# Patient Record
Sex: MALE | Race: BLACK OR AFRICAN AMERICAN | NOT HISPANIC OR LATINO | URBAN - METROPOLITAN AREA
[De-identification: names, ages, dates, MRNs, and addresses within clinical notes are randomized per-mention and may not be internally consistent; named-entity substitution may affect disease eponyms.]

---

## 2021-02-15 ENCOUNTER — INPATIENT (INPATIENT)
Facility: HOSPITAL | Age: 26
LOS: 1 days | Discharge: HOME | DRG: 378 | End: 2021-02-17
Attending: INTERNAL MEDICINE | Admitting: INTERNAL MEDICINE
Payer: MEDICAID

## 2021-02-15 VITALS
RESPIRATION RATE: 18 BRPM | SYSTOLIC BLOOD PRESSURE: 146 MMHG | OXYGEN SATURATION: 96 % | DIASTOLIC BLOOD PRESSURE: 102 MMHG | HEART RATE: 99 BPM | TEMPERATURE: 98 F | WEIGHT: 188.94 LBS

## 2021-02-15 DIAGNOSIS — K92.0 HEMATEMESIS: ICD-10-CM

## 2021-02-15 DIAGNOSIS — Z29.9 ENCOUNTER FOR PROPHYLACTIC MEASURES, UNSPECIFIED: ICD-10-CM

## 2021-02-15 DIAGNOSIS — F10.10 ALCOHOL ABUSE, UNCOMPLICATED: ICD-10-CM

## 2021-02-15 DIAGNOSIS — K29.21 ALCOHOLIC GASTRITIS WITH BLEEDING: ICD-10-CM

## 2021-02-15 LAB
ALBUMIN SERPL ELPH-MCNC: 3.7 G/DL — SIGNIFICANT CHANGE UP (ref 3.5–5)
ALP SERPL-CCNC: 104 U/L — SIGNIFICANT CHANGE UP (ref 40–120)
ALT FLD-CCNC: 185 U/L DA — HIGH (ref 10–60)
ANION GAP SERPL CALC-SCNC: 11 MMOL/L — SIGNIFICANT CHANGE UP (ref 5–17)
AST SERPL-CCNC: 534 U/L — HIGH (ref 10–40)
BASOPHILS # BLD AUTO: 0.03 K/UL — SIGNIFICANT CHANGE UP (ref 0–0.2)
BASOPHILS NFR BLD AUTO: 0.4 % — SIGNIFICANT CHANGE UP (ref 0–2)
BILIRUB DIRECT SERPL-MCNC: 0.4 MG/DL — HIGH (ref 0–0.2)
BILIRUB INDIRECT FLD-MCNC: 0.5 MG/DL — SIGNIFICANT CHANGE UP (ref 0.2–1)
BILIRUB SERPL-MCNC: 0.9 MG/DL — SIGNIFICANT CHANGE UP (ref 0.2–1.2)
BUN SERPL-MCNC: 7 MG/DL — SIGNIFICANT CHANGE UP (ref 7–18)
CALCIUM SERPL-MCNC: 9.2 MG/DL — SIGNIFICANT CHANGE UP (ref 8.4–10.5)
CHLORIDE SERPL-SCNC: 99 MMOL/L — SIGNIFICANT CHANGE UP (ref 96–108)
CO2 SERPL-SCNC: 29 MMOL/L — SIGNIFICANT CHANGE UP (ref 22–31)
CREAT SERPL-MCNC: 0.97 MG/DL — SIGNIFICANT CHANGE UP (ref 0.5–1.3)
EOSINOPHIL # BLD AUTO: 0.08 K/UL — SIGNIFICANT CHANGE UP (ref 0–0.5)
EOSINOPHIL NFR BLD AUTO: 1 % — SIGNIFICANT CHANGE UP (ref 0–6)
ETHANOL SERPL-MCNC: 97 MG/DL — HIGH (ref 0–10)
GLUCOSE SERPL-MCNC: 85 MG/DL — SIGNIFICANT CHANGE UP (ref 70–99)
HCT VFR BLD CALC: 29.3 % — LOW (ref 39–50)
HCT VFR BLD CALC: 38.8 % — LOW (ref 39–50)
HGB BLD-MCNC: 13.2 G/DL — SIGNIFICANT CHANGE UP (ref 13–17)
HGB BLD-MCNC: 9.3 G/DL — LOW (ref 13–17)
HIV 1 & 2 AB SERPL IA.RAPID: SIGNIFICANT CHANGE UP
IMM GRANULOCYTES NFR BLD AUTO: 0.8 % — SIGNIFICANT CHANGE UP (ref 0–1.5)
LIDOCAIN IGE QN: 146 U/L — SIGNIFICANT CHANGE UP (ref 73–393)
LYMPHOCYTES # BLD AUTO: 0.92 K/UL — LOW (ref 1–3.3)
LYMPHOCYTES # BLD AUTO: 11.7 % — LOW (ref 13–44)
MAGNESIUM SERPL-MCNC: 2.2 MG/DL — SIGNIFICANT CHANGE UP (ref 1.6–2.6)
MCHC RBC-ENTMCNC: 29.6 PG — SIGNIFICANT CHANGE UP (ref 27–34)
MCHC RBC-ENTMCNC: 31.7 GM/DL — LOW (ref 32–36)
MCHC RBC-ENTMCNC: 34 GM/DL — SIGNIFICANT CHANGE UP (ref 32–36)
MCHC RBC-ENTMCNC: 35.3 PG — HIGH (ref 27–34)
MCV RBC AUTO: 103.7 FL — HIGH (ref 80–100)
MCV RBC AUTO: 93.3 FL — SIGNIFICANT CHANGE UP (ref 80–100)
MONOCYTES # BLD AUTO: 1.28 K/UL — HIGH (ref 0–0.9)
MONOCYTES NFR BLD AUTO: 16.3 % — HIGH (ref 2–14)
NEUTROPHILS # BLD AUTO: 5.5 K/UL — SIGNIFICANT CHANGE UP (ref 1.8–7.4)
NEUTROPHILS NFR BLD AUTO: 69.8 % — SIGNIFICANT CHANGE UP (ref 43–77)
NRBC # BLD: 0 /100 WBCS — SIGNIFICANT CHANGE UP (ref 0–0)
NRBC # BLD: 0 /100 WBCS — SIGNIFICANT CHANGE UP (ref 0–0)
PHOSPHATE SERPL-MCNC: 4.4 MG/DL — SIGNIFICANT CHANGE UP (ref 2.5–4.5)
PLATELET # BLD AUTO: 158 K/UL — SIGNIFICANT CHANGE UP (ref 150–400)
PLATELET # BLD AUTO: 196 K/UL — SIGNIFICANT CHANGE UP (ref 150–400)
POTASSIUM SERPL-MCNC: 3.1 MMOL/L — LOW (ref 3.5–5.3)
POTASSIUM SERPL-SCNC: 3.1 MMOL/L — LOW (ref 3.5–5.3)
PROT SERPL-MCNC: 7.5 G/DL — SIGNIFICANT CHANGE UP (ref 6–8.3)
RBC # BLD: 3.14 M/UL — LOW (ref 4.2–5.8)
RBC # BLD: 3.74 M/UL — LOW (ref 4.2–5.8)
RBC # FLD: 12.3 % — SIGNIFICANT CHANGE UP (ref 10.3–14.5)
RBC # FLD: 15.9 % — HIGH (ref 10.3–14.5)
SARS-COV-2 RNA SPEC QL NAA+PROBE: SIGNIFICANT CHANGE UP
SODIUM SERPL-SCNC: 139 MMOL/L — SIGNIFICANT CHANGE UP (ref 135–145)
WBC # BLD: 4.44 K/UL — SIGNIFICANT CHANGE UP (ref 3.8–10.5)
WBC # BLD: 7.87 K/UL — SIGNIFICANT CHANGE UP (ref 3.8–10.5)
WBC # FLD AUTO: 4.44 K/UL — SIGNIFICANT CHANGE UP (ref 3.8–10.5)
WBC # FLD AUTO: 7.87 K/UL — SIGNIFICANT CHANGE UP (ref 3.8–10.5)

## 2021-02-15 PROCEDURE — 99285 EMERGENCY DEPT VISIT HI MDM: CPT

## 2021-02-15 RX ORDER — THIAMINE MONONITRATE (VIT B1) 100 MG
500 TABLET ORAL EVERY 8 HOURS
Refills: 0 | Status: DISCONTINUED | OUTPATIENT
Start: 2021-02-15 | End: 2021-02-17

## 2021-02-15 RX ORDER — ONDANSETRON 8 MG/1
4 TABLET, FILM COATED ORAL ONCE
Refills: 0 | Status: COMPLETED | OUTPATIENT
Start: 2021-02-15 | End: 2021-02-15

## 2021-02-15 RX ORDER — PANTOPRAZOLE SODIUM 20 MG/1
40 TABLET, DELAYED RELEASE ORAL ONCE
Refills: 0 | Status: COMPLETED | OUTPATIENT
Start: 2021-02-15 | End: 2021-02-15

## 2021-02-15 RX ORDER — SUCRALFATE 1 G
1 TABLET ORAL ONCE
Refills: 0 | Status: COMPLETED | OUTPATIENT
Start: 2021-02-15 | End: 2021-02-15

## 2021-02-15 RX ORDER — ONDANSETRON 8 MG/1
4 TABLET, FILM COATED ORAL EVERY 6 HOURS
Refills: 0 | Status: DISCONTINUED | OUTPATIENT
Start: 2021-02-15 | End: 2021-02-17

## 2021-02-15 RX ORDER — SODIUM CHLORIDE 9 MG/ML
1000 INJECTION INTRAMUSCULAR; INTRAVENOUS; SUBCUTANEOUS
Refills: 0 | Status: DISCONTINUED | OUTPATIENT
Start: 2021-02-15 | End: 2021-02-17

## 2021-02-15 RX ORDER — SODIUM CHLORIDE 9 MG/ML
1000 INJECTION INTRAMUSCULAR; INTRAVENOUS; SUBCUTANEOUS ONCE
Refills: 0 | Status: COMPLETED | OUTPATIENT
Start: 2021-02-15 | End: 2021-02-15

## 2021-02-15 RX ORDER — SODIUM CHLORIDE 9 MG/ML
2000 INJECTION INTRAMUSCULAR; INTRAVENOUS; SUBCUTANEOUS ONCE
Refills: 0 | Status: COMPLETED | OUTPATIENT
Start: 2021-02-15 | End: 2021-02-15

## 2021-02-15 RX ORDER — PANTOPRAZOLE SODIUM 20 MG/1
40 TABLET, DELAYED RELEASE ORAL
Refills: 0 | Status: DISCONTINUED | OUTPATIENT
Start: 2021-02-15 | End: 2021-02-16

## 2021-02-15 RX ORDER — FOLIC ACID 0.8 MG
1 TABLET ORAL DAILY
Refills: 0 | Status: DISCONTINUED | OUTPATIENT
Start: 2021-02-15 | End: 2021-02-17

## 2021-02-15 RX ADMIN — ONDANSETRON 4 MILLIGRAM(S): 8 TABLET, FILM COATED ORAL at 13:26

## 2021-02-15 RX ADMIN — Medication 50 MILLIGRAM(S): at 09:19

## 2021-02-15 RX ADMIN — PANTOPRAZOLE SODIUM 40 MILLIGRAM(S): 20 TABLET, DELAYED RELEASE ORAL at 09:20

## 2021-02-15 RX ADMIN — SODIUM CHLORIDE 1000 MILLILITER(S): 9 INJECTION INTRAMUSCULAR; INTRAVENOUS; SUBCUTANEOUS at 13:26

## 2021-02-15 RX ADMIN — Medication 1 GRAM(S): at 13:26

## 2021-02-15 RX ADMIN — SODIUM CHLORIDE 2000 MILLILITER(S): 9 INJECTION INTRAMUSCULAR; INTRAVENOUS; SUBCUTANEOUS at 10:05

## 2021-02-15 RX ADMIN — SODIUM CHLORIDE 2000 MILLILITER(S): 9 INJECTION INTRAMUSCULAR; INTRAVENOUS; SUBCUTANEOUS at 11:07

## 2021-02-15 RX ADMIN — Medication 105 MILLIGRAM(S): at 21:56

## 2021-02-15 NOTE — H&P ADULT - HISTORY OF PRESENT ILLNESS
25M. from home, self ambulating, chronic Alc abuse,  with no significant PMHx presents to the ED c/o abdominal pain and vomiting x today. Pt is AAOX3 and mentions thar he was in his usual state of health until last 3 months and then developed intermittent vomiting immediately after taking his alcohol. He takes 2 pints of alcohol day x 3 yrs.  Pt with minimal PO intake secondary to  nausea and vomiting. Pt notes emesis initially to be  with food content and then goes to bright red blood tinged emesis and then light blood tinged emesis. He called the EMS due to persistent vomitings  Pt has been having recurring episodes for the past 3 months. He was admitted in [facility] yesterday for similar complaints. Pt tried to stop drinking yesterday and would end up drinking more whenever he would go into withdrawal.  Pt's last drink to be somewhere this morning( exact time n unknown). While being transported by the EMS he fell asleep and woke up with   tremors, clenched his girlfriend. Pt also have epigastric discomfort, described as burning.   Pt denies any other complaints  He takes 2 pints of alcohol day x 3 yrs.    In the ED,  Pt appears comfortable, no signs of distress, AAOX3   Vitals- 137/86, Hr 85, afebrile   Hb 9.3  AST// 185  bal 97

## 2021-02-15 NOTE — ED PROVIDER NOTE - CARE PLAN
Principal Discharge DX:	Alcohol withdrawal syndrome without complication  Secondary Diagnosis:	Hematemesis with nausea

## 2021-02-15 NOTE — ED PROVIDER NOTE - NEUROLOGICAL, MLM
Alert and oriented, no focal deficits, no motor or sensory deficits. Pt not tremulous or anxious in ED.

## 2021-02-15 NOTE — H&P ADULT - NSHPLABSRESULTS_GEN_ALL_CORE
9.3    7.87  )-----------( 196      ( 15 Feb 2021 14:43 )             29.3       02-15    139  |  99  |  7   ----------------------------<  85  3.1<L>   |  29  |  0.97    Ca    9.2      15 Feb 2021 09:11  Phos  4.4     02-15  Mg     2.2     02-15    TPro  7.5  /  Alb  3.7  /  TBili  0.9  /  DBili  0.4<H>  /  AST  534<H>  /  ALT  185<H>  /  AlkPhos  104  02-15                      Lactate Trend            CAPILLARY BLOOD GLUCOSE      POCT Blood Glucose.: 96 mg/dL (15 Feb 2021 08:39)

## 2021-02-15 NOTE — CHART NOTE - NSCHARTNOTEFT_GEN_A_CORE
Met with patient at the bedside in the Emergency Department. Patient is corporative with screening and assessment.  Patient informed that he is in pain and believes he needs help to stop drinking. He shared that he has been drinking everyday for approximately 3 years and that it has gotten worse. 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 He also shared that he has received 3 DWI in the last couple of years because he cannot stop drinking.   SBIRT screening completed.  Patient committed to being in detox and rehabilitation.   Contact was made with a number of possible detox programs and [facility] (Tel. [phone]) responded positively noting that they would arrange transportation for admission today.  Patient is cleared for discharge to a detox program.

## 2021-02-15 NOTE — H&P ADULT - PROBLEM SELECTOR PLAN 4
IMPROVE VTE Individual Risk Assessment    RISK                                                          Points  [] Previous VTE                                           3  [] Thrombophilia                                        2  [] Lower limb paralysis                              2   [] Current Cancer                                       2   [x] Immobilization > 24 hrs                        1  [] ICU/CCU stay > 24 hours                       1  [] Age > 60                                                   1    IMPROVE VTE Score:  no ac for hemetemesis   PPI

## 2021-02-15 NOTE — H&P ADULT - PROBLEM SELECTOR PLAN 1
Pt admitted for abd pain and multiple vomitings today with hemetamesis  Vomtings were non bloody initially and then became bloody   Pt takes 2 pint of alcohol/day x 3 yrs   Symptoms likely due to Alc induced gastritis  c/w ivf and PPI   Clear diet for now   Hb 9.3   f/u cbc q12   No repeat episode of vomiting in ED   f/U CT angio abd   f/u GI - Dr. Meehan

## 2021-02-15 NOTE — H&P ADULT - NSHPPHYSICALEXAM_GEN_ALL_CORE
Vital Signs (24 Hrs):  T(C): 36.6 (02-15-21 @ 15:44), Max: 36.6 (02-15-21 @ 11:42)  HR: 85 (02-15-21 @ 15:44) (83 - 99)  BP: 137/86 (02-15-21 @ 15:44) (137/86 - 146/102)  RR: 18 (02-15-21 @ 15:44) (18 - 18)  SpO2: 99% (02-15-21 @ 15:44) (96% - 100%)  Wt(kg): --  Daily     Daily     I&O's Summary

## 2021-02-15 NOTE — H&P ADULT - ATTENDING COMMENTS
PATIENT SEEN AND EXAMINED. CASE D/W ER MD AND RESIDENT TEAM. ABOVE ROS/VS/PE REVIEWED AND VERIFIED.    HPI:    25M. from home, self ambulating, chronic Alc abuse,  with no significant PMHx presents to the ED c/o abdominal pain and vomiting x today. Pt is AAOX3 and mentions thar he was in his usual state of health until last 3 months and then developed intermittent vomiting immediately after taking his alcohol. He takes 2 pints of alcohol day x 3 yrs.  Pt with minimal PO intake secondary to  nausea and vomiting. Pt notes emesis initially to be  with food content and then goes to bright red blood tinged emesis and then light blood tinged emesis. He called the EMS due to persistent vomitings  Pt has been having recurring episodes for the past 3 months. He was admitted in [facility] yesterday for similar complaints. Pt tried to stop drinking yesterday and would end up drinking more whenever he would go into withdrawal.  Pt's last drink to be somewhere this morning( exact time n unknown). While being transported by the EMS he fell asleep and woke up with   tremors, clenched his girlfriend. Pt also have epigastric discomfort, described as burning.   Pt denies any other complaints  He takes 2 pints of alcohol day x 3 yrs.    In the ED,  Pt appears comfortable, no signs of distress, AAOX3   Vitals- 137/86, Hr 85, afebrile   Hb 9.3  AST// 185  bal 97      PLAN:     # HEMATEMESIS S/P RECURRENT EPISODES OF EMESIS + WRETCHING - F/U CT ABDOMEN, MONITORING HGB, NPO, IV ANTIEMETICS, PPI BID, GASTROENTEROLOGY CONSULT  # ALCOHOL INTOXICATION, ALCOHOL ABUSE - MONITORING CIWA SCORE, PRN ATIVAN, MVI, FOLATE, THIAMINE  - COUNSELLED >10 MINS  - PLAN FOR OUTPATIENT REHABILITATION  # TRANSAMINITIS - SUSPECT S/T ALCOHOL ABUSE - F/U U/S ABDOMEN, HEPATITIS PANEL  # GI PPX PATIENT SEEN AND EXAMINED. CASE D/W ER MD AND RESIDENT TEAM. ABOVE ROS/VS/PE REVIEWED AND VERIFIED.    HPI:    25M. from home, self ambulating, chronic Alc abuse,  with no significant PMHx presents to the ED c/o abdominal pain and vomiting x today. Pt is AAOX3 and mentions thar he was in his usual state of health until last 3 months and then developed intermittent vomiting immediately after taking his alcohol. He takes 2 pints of alcohol day x 3 yrs.  Pt with minimal PO intake secondary to  nausea and vomiting. Pt notes emesis initially to be  with food content and then goes to bright red blood tinged emesis and then light blood tinged emesis. He called the EMS due to persistent vomitings  Pt has been having recurring episodes for the past 3 months. He was admitted in [facility] yesterday for similar complaints. Pt tried to stop drinking yesterday and would end up drinking more whenever he would go into withdrawal.  Pt's last drink to be somewhere this morning( exact time n unknown). While being transported by the EMS he fell asleep and woke up with   tremors, clenched his girlfriend. Pt also have epigastric discomfort, described as burning.   Pt denies any other complaints  He takes 2 pints of alcohol day x 3 yrs.    In the ED,  Pt appears comfortable, no signs of distress, AAOX3   Vitals- 137/86, Hr 85, afebrile   Hb 9.3  AST// 185  bal 97      PLAN:     # HEMATEMESIS S/P RECURRENT EPISODES OF EMESIS + WRETCHING - F/U CT ABDOMEN, MONITORING HGB, NPO, IV ANTIEMETICS, PPI BID, GASTROENTEROLOGY CONSULT  - PLAN FOR EGD 02/15  # ALCOHOL INTOXICATION, ALCOHOL ABUSE - MONITORING CIWA SCORE, PRN ATIVAN, MVI, FOLATE, THIAMINE  - COUNSELLED >10 MINS  - PLAN FOR OUTPATIENT REHABILITATION  # TRANSAMINITIS - SUSPECT S/T ALCOHOL ABUSE - F/U U/S ABDOMEN, HEPATITIS PANEL  # GI PPX

## 2021-02-15 NOTE — H&P ADULT - PROBLEM SELECTOR PLAN 3
Pt has PMH of Alcohol abuse   SW consulted - Pt confirmed to enrol in a detox program post discharge

## 2021-02-15 NOTE — ED PROVIDER NOTE - CLINICAL SUMMARY MEDICAL DECISION MAKING FREE TEXT BOX
25M with Hx etoh abuse with tremors and vomiting. Will assess pt for pancreatitis, check H&H, call social work consult to place pt in detox.

## 2021-02-15 NOTE — H&P ADULT - ASSESSMENT
25M. from home, self ambulating, chronic Alc abuse,  with no significant PMHx presents to the ED c/o abdominal pain and vomiting x today.  Pt admitted for Alc induced gastritis with hematemesis

## 2021-02-15 NOTE — ED PROVIDER NOTE - OBJECTIVE STATEMENT
26 y/o male with no significant PMHx presents to the ED c/o tremors x today. Pt notes been drinking for 3 years straight and for last 3 months has been consistently vomiting, mostly after eating. Pt with minimal PO intake secondary to vomiting. Pt notes emesis initially with food content and then goes to bright red blood tinged emesis and then light blood tinged emesis and then the vomiting stops. Pt has been having recurring episodes for the past 3 months. Pt tried to stop drinking yesterday because of the vomiting. Pt woke up this morning with tremors, clenched his girlfriend in the morning who was concerned about him. Pt bought a bottle of liquor at the store this morning to drink and the came to the ED. Pt in ED seeking help with stopping drinking. Pt denies LOC, fever, or any other complaints. NKDA.

## 2021-02-15 NOTE — ED PROVIDER NOTE - PROGRESS NOTE DETAILS
sw unable to set up detox. pt still nausea. discussed the case with the admitting MD who accepts the patient for admission

## 2021-02-15 NOTE — CHART NOTE - NSCHARTNOTEFT_GEN_A_CORE
spoke with Dominick Serrano at [facility] (Tel. [phone]) [facility] reviewed the medical information for the patient and determined that addition review is required to confirm admission.  Patient was informed of the change in plan and pending admission to determine his medical readiness for admission.  patient may be discharged to a [facility] at [address] [place] [address], Tel. [phone] when medically cleared if patient has second thoughts about detox.

## 2021-02-16 DIAGNOSIS — E87.6 HYPOKALEMIA: ICD-10-CM

## 2021-02-16 DIAGNOSIS — F10.230 ALCOHOL DEPENDENCE WITH WITHDRAWAL, UNCOMPLICATED: ICD-10-CM

## 2021-02-16 LAB
A1C WITH ESTIMATED AVERAGE GLUCOSE RESULT: 4.9 % — SIGNIFICANT CHANGE UP (ref 4–5.6)
ALBUMIN SERPL ELPH-MCNC: 3 G/DL — LOW (ref 3.5–5)
ALP SERPL-CCNC: 97 U/L — SIGNIFICANT CHANGE UP (ref 40–120)
ALT FLD-CCNC: 143 U/L DA — HIGH (ref 10–60)
ANION GAP SERPL CALC-SCNC: 8 MMOL/L — SIGNIFICANT CHANGE UP (ref 5–17)
APTT BLD: 29.1 SEC — SIGNIFICANT CHANGE UP (ref 27.5–35.5)
AST SERPL-CCNC: 253 U/L — HIGH (ref 10–40)
BASOPHILS # BLD AUTO: 0.02 K/UL — SIGNIFICANT CHANGE UP (ref 0–0.2)
BASOPHILS NFR BLD AUTO: 0.6 % — SIGNIFICANT CHANGE UP (ref 0–2)
BILIRUB SERPL-MCNC: 1.1 MG/DL — SIGNIFICANT CHANGE UP (ref 0.2–1.2)
BUN SERPL-MCNC: 6 MG/DL — LOW (ref 7–18)
CALCIUM SERPL-MCNC: 9.1 MG/DL — SIGNIFICANT CHANGE UP (ref 8.4–10.5)
CHLORIDE SERPL-SCNC: 102 MMOL/L — SIGNIFICANT CHANGE UP (ref 96–108)
CHOLEST SERPL-MCNC: 152 MG/DL — SIGNIFICANT CHANGE UP
CO2 SERPL-SCNC: 29 MMOL/L — SIGNIFICANT CHANGE UP (ref 22–31)
CREAT SERPL-MCNC: 0.92 MG/DL — SIGNIFICANT CHANGE UP (ref 0.5–1.3)
EOSINOPHIL # BLD AUTO: 0.06 K/UL — SIGNIFICANT CHANGE UP (ref 0–0.5)
EOSINOPHIL NFR BLD AUTO: 1.7 % — SIGNIFICANT CHANGE UP (ref 0–6)
ESTIMATED AVERAGE GLUCOSE: 94 MG/DL — SIGNIFICANT CHANGE UP (ref 68–114)
FERRITIN SERPL-MCNC: 833 NG/ML — HIGH (ref 30–400)
FOLATE SERPL-MCNC: 3.1 NG/ML — LOW
GLUCOSE SERPL-MCNC: 78 MG/DL — SIGNIFICANT CHANGE UP (ref 70–99)
HAV IGM SER-ACNC: SIGNIFICANT CHANGE UP
HBV CORE IGM SER-ACNC: SIGNIFICANT CHANGE UP
HBV SURFACE AG SER-ACNC: SIGNIFICANT CHANGE UP
HCT VFR BLD CALC: 36.9 % — LOW (ref 39–50)
HCV AB S/CO SERPL IA: 0.09 S/CO — SIGNIFICANT CHANGE UP (ref 0–0.99)
HCV AB SERPL-IMP: SIGNIFICANT CHANGE UP
HDLC SERPL-MCNC: 84 MG/DL — SIGNIFICANT CHANGE UP
HGB BLD-MCNC: 12.7 G/DL — LOW (ref 13–17)
IMM GRANULOCYTES NFR BLD AUTO: 0.3 % — SIGNIFICANT CHANGE UP (ref 0–1.5)
LACTATE SERPL-SCNC: 0.7 MMOL/L — SIGNIFICANT CHANGE UP (ref 0.7–2)
LIPID PNL WITH DIRECT LDL SERPL: 59 MG/DL — SIGNIFICANT CHANGE UP
LYMPHOCYTES # BLD AUTO: 1.38 K/UL — SIGNIFICANT CHANGE UP (ref 1–3.3)
LYMPHOCYTES # BLD AUTO: 38.7 % — SIGNIFICANT CHANGE UP (ref 13–44)
MAGNESIUM SERPL-MCNC: 2.1 MG/DL — SIGNIFICANT CHANGE UP (ref 1.6–2.6)
MCHC RBC-ENTMCNC: 34.4 GM/DL — SIGNIFICANT CHANGE UP (ref 32–36)
MCHC RBC-ENTMCNC: 35.5 PG — HIGH (ref 27–34)
MCV RBC AUTO: 103.1 FL — HIGH (ref 80–100)
MONOCYTES # BLD AUTO: 0.28 K/UL — SIGNIFICANT CHANGE UP (ref 0–0.9)
MONOCYTES NFR BLD AUTO: 7.8 % — SIGNIFICANT CHANGE UP (ref 2–14)
NEUTROPHILS # BLD AUTO: 1.82 K/UL — SIGNIFICANT CHANGE UP (ref 1.8–7.4)
NEUTROPHILS NFR BLD AUTO: 50.9 % — SIGNIFICANT CHANGE UP (ref 43–77)
NON HDL CHOLESTEROL: 68 MG/DL — SIGNIFICANT CHANGE UP
NRBC # BLD: 0 /100 WBCS — SIGNIFICANT CHANGE UP (ref 0–0)
PHOSPHATE SERPL-MCNC: 3.9 MG/DL — SIGNIFICANT CHANGE UP (ref 2.5–4.5)
PLATELET # BLD AUTO: 143 K/UL — LOW (ref 150–400)
POTASSIUM SERPL-MCNC: 3 MMOL/L — LOW (ref 3.5–5.3)
POTASSIUM SERPL-SCNC: 3 MMOL/L — LOW (ref 3.5–5.3)
PROT SERPL-MCNC: 6.4 G/DL — SIGNIFICANT CHANGE UP (ref 6–8.3)
RBC # BLD: 3.58 M/UL — LOW (ref 4.2–5.8)
RBC # FLD: 11.9 % — SIGNIFICANT CHANGE UP (ref 10.3–14.5)
SARS-COV-2 IGG SERPL QL IA: NEGATIVE — SIGNIFICANT CHANGE UP
SARS-COV-2 IGM SERPL IA-ACNC: 0.17 INDEX — SIGNIFICANT CHANGE UP
SODIUM SERPL-SCNC: 139 MMOL/L — SIGNIFICANT CHANGE UP (ref 135–145)
TRIGL SERPL-MCNC: 44 MG/DL — SIGNIFICANT CHANGE UP
TROPONIN I SERPL-MCNC: <0.015 NG/ML — SIGNIFICANT CHANGE UP (ref 0–0.04)
TSH SERPL-MCNC: 1.07 UU/ML — SIGNIFICANT CHANGE UP (ref 0.34–4.82)
VIT B12 SERPL-MCNC: 544 PG/ML — SIGNIFICANT CHANGE UP (ref 232–1245)
WBC # BLD: 3.57 K/UL — LOW (ref 3.8–10.5)
WBC # FLD AUTO: 3.57 K/UL — LOW (ref 3.8–10.5)

## 2021-02-16 PROCEDURE — 74174 CTA ABD&PLVS W/CONTRAST: CPT | Mod: 26

## 2021-02-16 RX ORDER — FOLIC ACID 0.8 MG
1 TABLET ORAL
Qty: 30 | Refills: 0
Start: 2021-02-16 | End: 2021-03-17

## 2021-02-16 RX ORDER — POTASSIUM CHLORIDE 20 MEQ
40 PACKET (EA) ORAL ONCE
Refills: 0 | Status: COMPLETED | OUTPATIENT
Start: 2021-02-16 | End: 2021-02-16

## 2021-02-16 RX ORDER — PANTOPRAZOLE SODIUM 20 MG/1
40 TABLET, DELAYED RELEASE ORAL
Refills: 0 | Status: DISCONTINUED | OUTPATIENT
Start: 2021-02-16 | End: 2021-02-16

## 2021-02-16 RX ORDER — PANTOPRAZOLE SODIUM 20 MG/1
40 TABLET, DELAYED RELEASE ORAL
Refills: 0 | Status: DISCONTINUED | OUTPATIENT
Start: 2021-02-16 | End: 2021-02-17

## 2021-02-16 RX ORDER — PANTOPRAZOLE SODIUM 20 MG/1
40 TABLET, DELAYED RELEASE ORAL
Qty: 30 | Refills: 0
Start: 2021-02-16 | End: 2021-03-17

## 2021-02-16 RX ORDER — POTASSIUM CHLORIDE 20 MEQ
10 PACKET (EA) ORAL
Refills: 0 | Status: COMPLETED | OUTPATIENT
Start: 2021-02-16 | End: 2021-02-16

## 2021-02-16 RX ADMIN — Medication 50 MILLIGRAM(S): at 15:00

## 2021-02-16 RX ADMIN — Medication 105 MILLIGRAM(S): at 06:34

## 2021-02-16 RX ADMIN — Medication 50 MILLIGRAM(S): at 09:45

## 2021-02-16 RX ADMIN — Medication 105 MILLIGRAM(S): at 21:23

## 2021-02-16 RX ADMIN — Medication 2 MILLIGRAM(S): at 09:45

## 2021-02-16 RX ADMIN — Medication 100 MILLIEQUIVALENT(S): at 19:02

## 2021-02-16 RX ADMIN — ONDANSETRON 4 MILLIGRAM(S): 8 TABLET, FILM COATED ORAL at 00:30

## 2021-02-16 RX ADMIN — Medication 40 MILLIEQUIVALENT(S): at 19:03

## 2021-02-16 RX ADMIN — Medication 100 MILLIEQUIVALENT(S): at 11:39

## 2021-02-16 RX ADMIN — SODIUM CHLORIDE 85 MILLILITER(S): 9 INJECTION INTRAMUSCULAR; INTRAVENOUS; SUBCUTANEOUS at 00:34

## 2021-02-16 RX ADMIN — Medication 2 MILLIGRAM(S): at 21:24

## 2021-02-16 RX ADMIN — Medication 50 MILLIGRAM(S): at 21:23

## 2021-02-16 RX ADMIN — Medication 100 MILLIEQUIVALENT(S): at 13:38

## 2021-02-16 RX ADMIN — PANTOPRAZOLE SODIUM 40 MILLIGRAM(S): 20 TABLET, DELAYED RELEASE ORAL at 16:57

## 2021-02-16 RX ADMIN — Medication 2 MILLIGRAM(S): at 00:30

## 2021-02-16 RX ADMIN — Medication 2 MILLIGRAM(S): at 16:38

## 2021-02-16 RX ADMIN — PANTOPRAZOLE SODIUM 40 MILLIGRAM(S): 20 TABLET, DELAYED RELEASE ORAL at 06:35

## 2021-02-16 NOTE — CONSULT NOTE ADULT - PROBLEM SELECTOR RECOMMENDATION 9
Appears stable   I offered EGD however patient is insistant to leave.    I explained the risks of not doing an EGD including the risk of PUD that will rebleed, gastric CA, varices.   If insistent on leaving then discharge on PPI daily  IF staying NPO post MN (EGD not done today 2/2 to active withdrawal)

## 2021-02-16 NOTE — PROGRESS NOTE ADULT - SUBJECTIVE AND OBJECTIVE BOX
Patient is a 25y old  Male who presents with a chief complaint of abd pain and vomiting (16 Feb 2021 15:28)    PATIENT IS SEEN AND EXAMINED IN MEDICAL FLOOR.    ALLERGIES:  No Known Allergies    VITALS:    Vital Signs Last 24 Hrs  T(C): 36.7 (16 Feb 2021 14:04), Max: 37.3 (15 Feb 2021 23:40)  T(F): 98.1 (16 Feb 2021 14:04), Max: 99.1 (15 Feb 2021 23:40)  HR: 74 (16 Feb 2021 14:04) (64 - 99)  BP: 129/78 (16 Feb 2021 14:04) (123/73 - 142/82)  BP(mean): --  RR: 18 (16 Feb 2021 14:04) (18 - 18)  SpO2: 100% (16 Feb 2021 14:04) (99% - 100%)    LABS:    CBC Full  -  ( 16 Feb 2021 06:52 )  WBC Count : 3.57 K/uL  RBC Count : 3.58 M/uL  Hemoglobin : 12.7 g/dL  Hematocrit : 36.9 %  Platelet Count - Automated : 143 K/uL  Mean Cell Volume : 103.1 fl  Mean Cell Hemoglobin : 35.5 pg  Mean Cell Hemoglobin Concentration : 34.4 gm/dL  Auto Neutrophil # : 1.82 K/uL  Auto Lymphocyte # : 1.38 K/uL  Auto Monocyte # : 0.28 K/uL  Auto Eosinophil # : 0.06 K/uL  Auto Basophil # : 0.02 K/uL  Auto Neutrophil % : 50.9 %  Auto Lymphocyte % : 38.7 %  Auto Monocyte % : 7.8 %  Auto Eosinophil % : 1.7 %  Auto Basophil % : 0.6 %    PTT - ( 16 Feb 2021 06:52 )  PTT:29.1 sec  02-16    139  |  102  |  6<L>  ----------------------------<  78  3.0<L>   |  29  |  0.92    Ca    9.1      16 Feb 2021 06:52  Phos  3.9     02-16  Mg     2.1     02-16    TPro  6.4  /  Alb  3.0<L>  /  TBili  1.1  /  DBili  x   /  AST  253<H>  /  ALT  143<H>  /  AlkPhos  97  02-16    CAPILLARY BLOOD GLUCOSE        CARDIAC MARKERS ( 16 Feb 2021 06:52 )  <0.015 ng/mL / x     / x     / x     / x          LIVER FUNCTIONS - ( 16 Feb 2021 06:52 )  Alb: 3.0 g/dL / Pro: 6.4 g/dL / ALK PHOS: 97 U/L / ALT: 143 U/L DA / AST: 253 U/L / GGT: x           Creatinine Trend: 0.92<--, 0.97<--  I&O's Summary    MEDICATIONS:    MEDICATIONS  (STANDING):  chlordiazePOXIDE 50 milliGRAM(s) Oral every 8 hours  folic acid 1 milliGRAM(s) Oral daily  LORazepam   Injectable 2 milliGRAM(s) IV Push every 4 hours  multivitamin 1 Tablet(s) Oral daily  pantoprazole  Injectable 40 milliGRAM(s) IV Push two times a day  potassium chloride  10 mEq/100 mL IVPB 10 milliEquivalent(s) IV Intermittent every 1 hour  sodium chloride 0.9%. 1000 milliLiter(s) (85 mL/Hr) IV Continuous <Continuous>  thiamine IVPB 500 milliGRAM(s) IV Intermittent every 8 hours      MEDICATIONS  (PRN):  LORazepam   Injectable 2 milliGRAM(s) IV Push every 2 hours PRN Agitation  ondansetron Injectable 4 milliGRAM(s) IV Push every 6 hours PRN Nausea and/or Vomiting      REVIEW OF SYSTEMS:                           ALL ROS DONE [ X   ]    CONSTITUTIONAL:  LETHARGIC [   ], FEVER [   ], UNRESPONSIVE [   ]  CVS:  CP  [   ], SOB, [   ], PALPITATIONS [   ], DIZZYNESS [   ]  RS: COUGH [   ], SPUTUM [   ]  GI: ABDOMINAL PAIN [   ], NAUSEA [   ], VOMITINGS [   ], DIARRHEA [   ], CONSTIPATION [   ]  :  DYSURIA [   ], NOCTURIA [   ], INCREASED FREQUENCY [   ], DRIBLING [   ],  SKELETAL: PAINFUL JOINTS [   ], SWOLLEN JOINTS [   ], NECK ACHE [   ], LOW BACK ACHE [   ],  SKIN : ULCERS [   ], RASH [   ], ITCHING [   ]  CNS: HEAD ACHE [   ], DOUBLE VISION [   ], BLURRED VISION [   ], AMS / CONFUSION [   ], SEIZURES [   ], WEAKNESS [   ],TINGLING / NUMBNESS [   ]    PHYSICAL EXAMINATION:  GENERAL APPEARANCE: NO DISTRESS  HEENT:  NO PALLOR, NO  JVD,  NO   NODES, NECK SUPPLE  CVS: S1 +, S2 +,   RS: AEEB,  OCCASIONAL  RALES +,   NO RONCHI  ABD: SOFT, NT, NO, BS +  EXT: NO PE  SKIN: WARM,   SKELETAL:  ROM ACCEPTABLE  CNS:  AAO X  3, no  DEFICITS    RADIOLOGY :    EXAM:  CT ANGIO ABD PELV (W)AW IC                            PROCEDURE DATE:  02/16/2021          INTERPRETATION:  CLINICAL INFORMATION: Upper gastrointestinal bleeding    COMPARISON: None.    PROCEDURE:  CT of the Abdomen and Pelvis was performed with and without intravenous contrast.  Precontrast, Arterial and Delayed phases were performed.  Intravenous contrast: 95 ml Omnipaque 350. 5 ml discarded.  Oral contrast: None.  Sagittal and coronal 3-D reformats were performed.    FINDINGS:  LOWER CHEST: Clear lung bases. Distal esophageal wall thickening.    LIVER: Steatosis.  BILE DUCTS: Normal caliber.  GALLBLADDER: Within normal limits.  SPLEEN: Within normal limits.  PANCREAS: Within normal limits.  ADRENALS: Within normal limits.  KIDNEYS/URETERS: Within normal limits.    BLADDER: Within normal limits.  REPRODUCTIVE ORGANS: Prostate within normal limits.    BOWEL: No bowel obstruction. Appendix is normal. No areas of active intravenous contrast extravasation identified in the bowel.  PERITONEUM: No ascites.  VESSELS: Within normal limits.  RETROPERITONEUM/LYMPH NODES: No lymphadenopathy.  ABDOMINAL WALL: Fat-containing bilateral inguinal hernias.  BONES: Within normal limits.    IMPRESSION:  No CT evidence of active gastrointestinal bleeding.  Distal esophageal wall thickening, question esophagitis.  Hepatic steatosis.      ASSESSMENT :     Hematemesis        PLAN:  HPI:  25M. from home, self ambulating, chronic Alc abuse,  with no significant PMHx presents to the ED c/o abdominal pain and vomiting x today. Pt is AAOX3 and mentions thar he was in his usual state of health until last 3 months and then developed intermittent vomiting immediately after taking his alcohol. He takes 2 pints of alcohol day x 3 yrs.  Pt with minimal PO intake secondary to  nausea and vomiting. Pt notes emesis initially to be  with food content and then goes to bright red blood tinged emesis and then light blood tinged emesis. He called the EMS due to persistent vomitings  Pt has been having recurring episodes for the past 3 months. He was admitted in [facility] yesterday for similar complaints. Pt tried to stop drinking yesterday and would end up drinking more whenever he would go into withdrawal.  Pt's last drink to be somewhere this morning( exact time n unknown). While being transported by the EMS he fell asleep and woke up with   tremors, clenched his girlfriend. Pt also have epigastric discomfort, described as burning.   Pt denies any other complaints  He takes 2 pints of alcohol day x 3 yrs.    In the ED,  Pt appears comfortable, no signs of distress, AAOX3   Vitals- 137/86, Hr 85, afebrile   Hb 9.3  AST// 185  bal 97     (15 Feb 2021 18:25)    PLAN:     # HEMATEMESIS S/P RECURRENT EPISODES OF EMESIS + WRETCHING W/ SUSPECTED ESOPHAGITIS - REVIEWED CT ABDOMEN, MONITORING HGB, NPO, IV ANTIEMETICS, PPI BID, GASTROENTEROLOGY CONSULT  - PLAN FOR EGD 02/16  # ALCOHOL INTOXICATION, ALCOHOL ABUSE - MONITORING CIWA SCORE, LIBRIUM, PRN ATIVAN, MVI, FOLATE, THIAMINE  - COUNSELLED >10 MINS  - PLAN FOR OUTPATIENT REHABILITATION  # TRANSAMINITIS W/ HEPATIC STEATOSIS [FATTY LIVER DISEASE] - SUSPECT S/T ALCOHOL ABUSE - F/U U/S ABDOMEN, HEPATITIS PANEL, REVIEWED CT ABDOMEN  # HYPOKALEMIA - REPLETING WITH SUPPLEMENT  # GI PPX

## 2021-02-16 NOTE — PROGRESS NOTE ADULT - PROBLEM SELECTOR PLAN 3
-extensive history of heavy alcohol abuse, pt agreeable for inpt rehab post discharge   -SW following -extensive history of heavy alcohol abuse, pt agreeable for inpt rehab post discharge   -mon CIWA score and cont symptom triggered ativan   -cont MVI, folic acid and thiamine   -SW following -extensive history of heavy alcohol abuse, pt agreeable for inpt rehab post discharge   -cont Librium   -mon CIWA score and cont symptom triggered ativan   -cont MVI, folic acid and thiamine   -SW following

## 2021-02-16 NOTE — CONSULT NOTE ADULT - SUBJECTIVE AND OBJECTIVE BOX
Patient is a 25y old  Male who presents with a chief complaint of abd pain and vomiting (16 Feb 2021 15:28)     .     HPI:    HPI:  25M. from home, self ambulating, chronic Alc abuse,  with no significant PMHx presents to the ED c/o abdominal pain and vomiting x today. Pt is AAOX3 and mentions thar he was in his usual state of health until last 3 months and then developed intermittent vomiting immediately after taking his alcohol. He takes 2 pints of alcohol day x 3 yrs.  Pt with minimal PO intake secondary to  nausea and vomiting. Pt notes emesis initially to be  with food content and then goes to bright red blood tinged emesis and then light blood tinged emesis. He called the EMS due to persistent vomitings  Pt has been having recurring episodes for the past 3 months. He was admitted in [facility] yesterday for similar complaints. Pt tried to stop drinking yesterday and would end up drinking more whenever he would go into withdrawal.  Pt's last drink to be somewhere this morning( exact time n unknown). While being transported by the EMS he fell asleep and woke up with   tremors, clenched his girlfriend. Pt also have epigastric discomfort, described as burning.   Pt denies any other complaints  He takes 2 pints of alcohol day x 3 yrs.    In the ED,  Pt appears comfortable, no signs of distress, AAOX3   Vitals- 137/86, Hr 85, afebrile   Hb 9.3  AST// 185  bal 97     (15 Feb 2021 18:25)          REVIEW OF SYSTEMS  Constitutional:   No fever, no fatigue, no pallor, no night sweats, no weight loss.  HEENT:   No eye pain, no vision changes, no icterus, no mouth ulcers.  Respiratory:   No shortness of breath, no cough, no respiratory distress.   Cardiovascular:   No chest pain, no palpitations.   Gastrointestinal: No abdominal pain, no nausea, no vomiting , no diahrrea, no constipation, no hematochezia,no melena.  Skin:   No rashes, no jaundice, no eczema.   Musculoskeletal:   No joint pain, no swelling, no myalgia.   Neurologic:   No headache, no seizure, no weakness.   Genitourinary:   No dysuria, no decreased urine output.  Psychiatric:  No depression, no anxiety,   Endocrine:   No thyroid disease, no diabetes.  Heme/Lymphatic:   No anemia, no blood transfusions, no lymph node enlargement, no bleeding, no bruising.  ___________________________________________________________________________________________  Allergies    No Known Allergies    Intolerances      MEDICATIONS  (STANDING):  chlordiazePOXIDE 50 milliGRAM(s) Oral every 8 hours  folic acid 1 milliGRAM(s) Oral daily  LORazepam   Injectable 2 milliGRAM(s) IV Push every 4 hours  multivitamin 1 Tablet(s) Oral daily  pantoprazole  Injectable 40 milliGRAM(s) IV Push two times a day  potassium chloride  10 mEq/100 mL IVPB 10 milliEquivalent(s) IV Intermittent every 1 hour  sodium chloride 0.9%. 1000 milliLiter(s) (85 mL/Hr) IV Continuous <Continuous>  thiamine IVPB 500 milliGRAM(s) IV Intermittent every 8 hours    MEDICATIONS  (PRN):  LORazepam   Injectable 2 milliGRAM(s) IV Push every 2 hours PRN Agitation  ondansetron Injectable 4 milliGRAM(s) IV Push every 6 hours PRN Nausea and/or Vomiting      PAST MEDICAL & SURGICAL HISTORY:    FAMILY HISTORY:    Social History: No hsitory of : Tobacco use, IVDA, EToH  ______________________________________________________________________________________    PHYSICAL EXAM    Daily     Daily   BMI:   Change in Weight:  Vital Signs Last 24 Hrs  T(C): 36.7 (16 Feb 2021 14:04), Max: 37.3 (15 Feb 2021 23:40)  T(F): 98.1 (16 Feb 2021 14:04), Max: 99.1 (15 Feb 2021 23:40)  HR: 74 (16 Feb 2021 14:04) (64 - 99)  BP: 129/78 (16 Feb 2021 14:04) (123/73 - 142/82)  BP(mean): --  RR: 18 (16 Feb 2021 14:04) (18 - 18)  SpO2: 100% (16 Feb 2021 14:04) (99% - 100%)    General:  Well developed, well nourished, alert and active, no pallor, NAD.  HEENT:    Normal appearance of conjunctiva, ears, nose, lips, oropharynx, and oral mucosa, anicteric.  Neck:  No masses, no asymmetry.  Lymph Nodes:  No lymphadenopathy.   Cardiovascular:  RRR normal S1/S2, no murmur.  Respiratory:  CTA B/L, normal respiratory effort.   Abdominal:   soft, no masses or tenderness, normoactive BS, NT/ND, no HSM.  Extremities:   No clubbing or cyanosis, normal capillary refill, no edema.   Skin:   No rash, jaundice, lesions, eczema.   Musculoskeletal:  No joint swelling, erythema or tenderness.   Neuro: No focal deficits.   Other:   _______________________________________________________________________________________________  Lab Results:                          12.7   3.57  )-----------( 143      ( 16 Feb 2021 06:52 )             36.9     02-16    139  |  102  |  6<L>  ----------------------------<  78  3.0<L>   |  29  |  0.92    Ca    9.1      16 Feb 2021 06:52  Phos  3.9     02-16  Mg     2.1     02-16    TPro  6.4  /  Alb  3.0<L>  /  TBili  1.1  /  DBili  x   /  AST  253<H>  /  ALT  143<H>  /  AlkPhos  97  02-16    LIVER FUNCTIONS - ( 16 Feb 2021 06:52 )  Alb: 3.0 g/dL / Pro: 6.4 g/dL / ALK PHOS: 97 U/L / ALT: 143 U/L DA / AST: 253 U/L / GGT: x           PTT - ( 16 Feb 2021 06:52 )  PTT:29.1 sec  Triglycerides, Serum: 44 mg/dL (02-16 @ 06:52)    CARDIAC MARKERS ( 16 Feb 2021 06:52 )  <0.015 ng/mL / x     / x     / x     / x          Stool Results:          RADIOLOGY RESULTS:    SURGICAL PATHOLOGY:

## 2021-02-16 NOTE — PROGRESS NOTE ADULT - PROBLEM SELECTOR PLAN 4
-dvt ppx- hold chemo ppx given hematemesis  -gi ppx- cont PPI -likely due to GI losses   -supplemented   -mon K level

## 2021-02-16 NOTE — PROGRESS NOTE ADULT - SUBJECTIVE AND OBJECTIVE BOX
Patient is a 25y old  Male who presents with a chief complaint of abd pain and vomiting (16 Feb 2021 09:56)    OVERNIGHT EVENTS: no acute events overnight, CIWA score 1 this         REVIEW OF SYSTEMS:  CONSTITUTIONAL: No fever, chills  ENMT:  No difficulty hearing, no change in vision  NECK: No pain or stiffness  RESPIRATORY: No cough, SOB  CARDIOVASCULAR: No chest pain, palpitations  GASTROINTESTINAL: No abdominal pain. No nausea, vomiting, or diarrhea  GENITOURINARY: No dysuria  NEUROLOGICAL: No HA  SKIN: No itching, burning, rashes, or lesions   LYMPH NODES: No enlarged glands  ENDOCRINE: No heat or cold intolerance; No hair loss  MUSCULOSKELETAL: No joint pain or swelling; No muscle, back, or extremity pain  PSYCHIATRIC: No depression, anxiety  HEME/LYMPH: No easy bruising, or bleeding gums    T(C): 36.6 (02-16-21 @ 06:06), Max: 37.3 (02-15-21 @ 23:40)  HR: 64 (02-16-21 @ 06:06) (64 - 99)  BP: 123/73 (02-16-21 @ 06:06) (123/73 - 142/82)  RR: 18 (02-16-21 @ 06:06) (18 - 18)  SpO2: 100% (02-16-21 @ 06:06) (99% - 100%)  Wt(kg): --Vital Signs Last 24 Hrs  T(C): 36.6 (16 Feb 2021 06:06), Max: 37.3 (15 Feb 2021 23:40)  T(F): 97.8 (16 Feb 2021 06:06), Max: 99.1 (15 Feb 2021 23:40)  HR: 64 (16 Feb 2021 06:06) (64 - 99)  BP: 123/73 (16 Feb 2021 06:06) (123/73 - 142/82)  BP(mean): --  RR: 18 (16 Feb 2021 06:06) (18 - 18)  SpO2: 100% (16 Feb 2021 06:06) (99% - 100%)    PHYSICAL EXAM:  GENERAL: NAD  EYES: clear conjunctiva; EOMI  ENMT: Moist mucous membranes  NECK: Supple, No JVD, Normal thyroid  CHEST/LUNG: Clear to auscultation bilaterally; No rales, rhonchi, wheezing, or rubs  HEART: S1, S2, Regular rate and rhythm  ABDOMEN: Soft, Nontender, Nondistended; Bowel sounds present  NEURO: Alert & Oriented X3  EXTREMITIES: No LE edema, no calf tenderness  LYMPH: No lymphadenopathy noted  SKIN: No rashes or lesions    Consultant(s) Notes Reviewed:  [x ] YES  [ ] NO  Care Discussed with Consultants/Other Providers [ x] YES  [ ] NO    LABS:                        12.7   3.57  )-----------( 143      ( 16 Feb 2021 06:52 )             36.9     02-16    139  |  102  |  6<L>  ----------------------------<  78  3.0<L>   |  29  |  0.92    Ca    9.1      16 Feb 2021 06:52  Phos  3.9     02-16  Mg     2.1     02-16    TPro  6.4  /  Alb  3.0<L>  /  TBili  1.1  /  DBili  x   /  AST  253<H>  /  ALT  143<H>  /  AlkPhos  97  02-16    PTT - ( 16 Feb 2021 06:52 )  PTT:29.1 sec  CAPILLARY BLOOD GLUCOSE                RADIOLOGY & ADDITIONAL TESTS:    Imaging Personally Reviewed:  [ ] YES  [ ] NO   Patient is a 25y old  Male who presents with a chief complaint of abd pain and vomiting (16 Feb 2021 09:56)    OVERNIGHT EVENTS: no acute events overnight, CIWA score 1 this       REVIEW OF SYSTEMS:  CONSTITUTIONAL: No fever, chills  ENMT:  No difficulty hearing, no change in vision  NECK: No pain or stiffness  RESPIRATORY: No cough, SOB  CARDIOVASCULAR: No chest pain, palpitations  GASTROINTESTINAL: No abdominal pain. No nausea, vomiting, or diarrhea  GENITOURINARY: No dysuria  NEUROLOGICAL: No HA  SKIN: No itching, burning, rashes, or lesions   MUSCULOSKELETAL: No joint pain or swelling; No muscle, back, or extremity pain  PSYCHIATRIC: No depression, anxiety      T(C): 36.6 (02-16-21 @ 06:06), Max: 37.3 (02-15-21 @ 23:40)  HR: 64 (02-16-21 @ 06:06) (64 - 99)  BP: 123/73 (02-16-21 @ 06:06) (123/73 - 142/82)  RR: 18 (02-16-21 @ 06:06) (18 - 18)  SpO2: 100% (02-16-21 @ 06:06) (99% - 100%)  Wt(kg): --Vital Signs Last 24 Hrs  T(C): 36.6 (16 Feb 2021 06:06), Max: 37.3 (15 Feb 2021 23:40)  T(F): 97.8 (16 Feb 2021 06:06), Max: 99.1 (15 Feb 2021 23:40)  HR: 64 (16 Feb 2021 06:06) (64 - 99)  BP: 123/73 (16 Feb 2021 06:06) (123/73 - 142/82)  BP(mean): --  RR: 18 (16 Feb 2021 06:06) (18 - 18)  SpO2: 100% (16 Feb 2021 06:06) (99% - 100%)    MEDICATIONS  (STANDING):  folic acid 1 milliGRAM(s) Oral daily  multivitamin 1 Tablet(s) Oral daily  pantoprazole  Injectable 40 milliGRAM(s) IV Push two times a day  potassium chloride  10 mEq/100 mL IVPB 10 milliEquivalent(s) IV Intermittent every 1 hour  sodium chloride 0.9%. 1000 milliLiter(s) (85 mL/Hr) IV Continuous <Continuous>  thiamine IVPB 500 milliGRAM(s) IV Intermittent every 8 hours    MEDICATIONS  (PRN):  chlordiazePOXIDE 50 milliGRAM(s) Oral every 1 hour PRN Symptom-triggered: each CIWA -Ar score 8 or GREATER  LORazepam   Injectable 2 milliGRAM(s) IV Push every 2 hours PRN Agitation  ondansetron Injectable 4 milliGRAM(s) IV Push every 6 hours PRN Nausea and/or Vomiting      PHYSICAL EXAM:  GENERAL: NAD, lethargic   EYES: clear conjunctiva  ENMT: Moist mucous membranes  NECK: Supple, No JVD, Normal thyroid  CHEST/LUNG: Clear to auscultation bilaterally; No rales, rhonchi, wheezing, or rubs  HEART: S1, S2, Regular rate and rhythm  ABDOMEN: Soft, Nontender, Nondistended; Bowel sounds present  NEURO: Alert & Oriented X3  EXTREMITIES: No LE edema, no calf tenderness  SKIN: No rashes or lesions    Consultant(s) Notes Reviewed:  [x ] YES  [ ] NO  Care Discussed with Consultants/Other Providers [ x] YES  [ ] NO    LABS:                        12.7   3.57  )-----------( 143      ( 16 Feb 2021 06:52 )             36.9     02-16    139  |  102  |  6<L>  ----------------------------<  78  3.0<L>   |  29  |  0.92    Ca    9.1      16 Feb 2021 06:52  Phos  3.9     02-16  Mg     2.1     02-16    TPro  6.4  /  Alb  3.0<L>  /  TBili  1.1  /  DBili  x   /  AST  253<H>  /  ALT  143<H>  /  AlkPhos  97  02-16    PTT - ( 16 Feb 2021 06:52 )  PTT:29.1 sec  CAPILLARY BLOOD GLUCOSE    RADIOLOGY & ADDITIONAL TESTS:    < from: CT Angio Abdomen and Pelvis w/ IV Cont (02.16.21 @ 00:25) >  ******PRELIMINARY REPORT******    ******PRELIMINARY REPORT******          EXAM:  CT ANGIO ABD PELV (W)AW IC                            PROCEDURE DATE:  02/16/2021    ******PRELIMINARY REPORT******    ******PRELIMINARY REPORT******              INTERPRETATION:  No evidence of active gastrointestinal bleeding. Thickening of distal esophageal walls concerning for an esophagitis.    < end of copied text >        Imaging Personally Reviewed:  [ ] YES  [ ] NO

## 2021-02-16 NOTE — DISCHARGE NOTE PROVIDER - NSDCCPCAREPLAN_GEN_ALL_CORE_FT
PRINCIPAL DISCHARGE DIAGNOSIS  Diagnosis: Alcohol withdrawal syndrome without complication  Assessment and Plan of Treatment: continue to take vitamins as prescribed   If  you need more resources to help you quitting alcohol please visit social work office in the community or discuss with your PMD      SECONDARY DISCHARGE DIAGNOSES  Diagnosis: Hypokalemia  Assessment and Plan of Treatment: this low potassium can have causes that are not due to GI loss. can be due to inadequate dietary intake for your case  continue to have blood work to chekc potassium level and then get supplement is needed based on blood work   Weakness, fatigue, constipation are expected due to low potassium leve in your blood  If you have arrhythmia, chest pain please call your primary care provider right away      Diagnosis: Gastritis with bleeding due to alcohol  Assessment and Plan of Treatment: Gastritis with bleeding due to alcohol     PRINCIPAL DISCHARGE DIAGNOSIS  Diagnosis: Alcohol withdrawal syndrome without complication  Assessment and Plan of Treatment: continue to take vitamins as prescribed   If  you need more resources to help you quitting alcohol please visit social work office in the community or discuss with your PMD  YOU DECIDED TO SIGN OUT AGAINST MEDICAL ADVICE, FOLLOW UP WITH YOUR PCP.      SECONDARY DISCHARGE DIAGNOSES  Diagnosis: Gastritis with bleeding due to alcohol  Assessment and Plan of Treatment: You were scheduled for an EGD.  YOU SIGNED OUT AGAINST MEDICAL ADVICE, FOLLOW UP WITH YOUR PCP.    Diagnosis: Hypokalemia  Assessment and Plan of Treatment: this low potassium can have causes that are not due to GI loss. can be due to inadequate dietary intake for your case  continue to have blood work to chekc potassium level and then get supplement is needed based on blood work   Weakness, fatigue, constipation are expected due to low potassium leve in your blood  If you have arrhythmia, chest pain please call your primary care provider right away  YOU DECIDED TO SIGN OUT AGAINST MEDICAL ADVICE, FOLLOW UP WITH YOUR PCP.        PRINCIPAL DISCHARGE DIAGNOSIS  Diagnosis: Alcohol withdrawal syndrome without complication  Assessment and Plan of Treatment: You were admitted for alcohol withdrawal and decided to enter rehab after discharge.  Take Ativan 2mg PO every 6 hours for 3 days, than  Take Ativan 2mg PO every 8 hours for 3 days, than  Take Ativan 2mg every 12 hours for 3 days and stop.  continue to take vitamins as prescribed   If  you need more resources to help you quitting alcohol please visit social work office in the community or discuss with your PMD        SECONDARY DISCHARGE DIAGNOSES  Diagnosis: Gastritis with bleeding due to alcohol  Assessment and Plan of Treatment: You were scheduled for an EGD.  YOU SIGNED OUT AGAINST MEDICAL ADVICE, FOLLOW UP WITH YOUR PCP.    Diagnosis: Hypokalemia  Assessment and Plan of Treatment: this low potassium can have causes that are not due to GI loss. can be due to inadequate dietary intake for your case  continue to have blood work to chekc potassium level and then get supplement is needed based on blood work   Weakness, fatigue, constipation are expected due to low potassium leve in your blood  If you have arrhythmia, chest pain please call your primary care provider right away  YOU DECIDED TO SIGN OUT AGAINST MEDICAL ADVICE, FOLLOW UP WITH YOUR PCP.

## 2021-02-16 NOTE — DISCHARGE NOTE PROVIDER - CARE PROVIDER_API CALL
Nina Head)  Internal Medicine  [phone] [place] [address]  Phone: [phone]  Fax: [phone]  Follow Up Time: 1 week

## 2021-02-16 NOTE — DISCHARGE NOTE PROVIDER - NSDCMRMEDTOKEN_GEN_ALL_CORE_FT
folic acid 1 mg oral tablet: 1 tab(s) orally once a day  Multiple Vitamins oral tablet: 1 tab(s) orally once a day  Protonix 40 mg oral delayed release tablet: 40 tab(s) orally once a day    Ativan 2 mg oral tablet: 1 tab(s) orally 4 times a day MDD:8mg     Take 1 tablet every 6 hours for 3 days Take 1 tablet every 8 hours for 3 days Take 1 tablet every 12 hours for 3 days Total 27 tablets MDD:8mg  folic acid 1 mg oral tablet: 1 tab(s) orally once a day  Multiple Vitamins oral tablet: 1 tab(s) orally once a day  Protonix 40 mg oral delayed release tablet: 40 tab(s) orally once a day    Ativan 2 mg oral tablet: 1 tab(s) orally every 6 hours MDD:8mg  folic acid 1 mg oral tablet: 1 tab(s) orally once a day  Multiple Vitamins oral tablet: 1 tab(s) orally once a day  Protonix 40 mg oral delayed release tablet: 40 tab(s) orally once a day

## 2021-02-17 VITALS — TEMPERATURE: 98 F | RESPIRATION RATE: 16 BRPM | OXYGEN SATURATION: 100 % | HEART RATE: 69 BPM

## 2021-02-17 LAB
ALBUMIN SERPL ELPH-MCNC: 3 G/DL — LOW (ref 3.5–5)
ALP SERPL-CCNC: 84 U/L — SIGNIFICANT CHANGE UP (ref 40–120)
ALT FLD-CCNC: 163 U/L DA — HIGH (ref 10–60)
ANION GAP SERPL CALC-SCNC: 7 MMOL/L — SIGNIFICANT CHANGE UP (ref 5–17)
APTT BLD: 28.9 SEC — SIGNIFICANT CHANGE UP (ref 27.5–35.5)
AST SERPL-CCNC: 262 U/L — HIGH (ref 10–40)
BILIRUB SERPL-MCNC: 0.8 MG/DL — SIGNIFICANT CHANGE UP (ref 0.2–1.2)
BLD GP AB SCN SERPL QL: SIGNIFICANT CHANGE UP
BUN SERPL-MCNC: 8 MG/DL — SIGNIFICANT CHANGE UP (ref 7–18)
CALCIUM SERPL-MCNC: 8.6 MG/DL — SIGNIFICANT CHANGE UP (ref 8.4–10.5)
CHLORIDE SERPL-SCNC: 105 MMOL/L — SIGNIFICANT CHANGE UP (ref 96–108)
CO2 SERPL-SCNC: 26 MMOL/L — SIGNIFICANT CHANGE UP (ref 22–31)
CREAT SERPL-MCNC: 0.86 MG/DL — SIGNIFICANT CHANGE UP (ref 0.5–1.3)
GLUCOSE SERPL-MCNC: 83 MG/DL — SIGNIFICANT CHANGE UP (ref 70–99)
HCT VFR BLD CALC: 37.4 % — LOW (ref 39–50)
HGB BLD-MCNC: 13 G/DL — SIGNIFICANT CHANGE UP (ref 13–17)
INR BLD: 1.04 RATIO — SIGNIFICANT CHANGE UP (ref 0.88–1.16)
MAGNESIUM SERPL-MCNC: 1.9 MG/DL — SIGNIFICANT CHANGE UP (ref 1.6–2.6)
MCHC RBC-ENTMCNC: 34.8 GM/DL — SIGNIFICANT CHANGE UP (ref 32–36)
MCHC RBC-ENTMCNC: 35.9 PG — HIGH (ref 27–34)
MCV RBC AUTO: 103.3 FL — HIGH (ref 80–100)
NRBC # BLD: 0 /100 WBCS — SIGNIFICANT CHANGE UP (ref 0–0)
PHOSPHATE SERPL-MCNC: 3.4 MG/DL — SIGNIFICANT CHANGE UP (ref 2.5–4.5)
PLATELET # BLD AUTO: 125 K/UL — LOW (ref 150–400)
POTASSIUM SERPL-MCNC: 3.4 MMOL/L — LOW (ref 3.5–5.3)
POTASSIUM SERPL-SCNC: 3.4 MMOL/L — LOW (ref 3.5–5.3)
PROT SERPL-MCNC: 6.4 G/DL — SIGNIFICANT CHANGE UP (ref 6–8.3)
PROTHROM AB SERPL-ACNC: 12.3 SEC — SIGNIFICANT CHANGE UP (ref 10.6–13.6)
RBC # BLD: 3.62 M/UL — LOW (ref 4.2–5.8)
RBC # FLD: 11.9 % — SIGNIFICANT CHANGE UP (ref 10.3–14.5)
SODIUM SERPL-SCNC: 138 MMOL/L — SIGNIFICANT CHANGE UP (ref 135–145)
WBC # BLD: 4.23 K/UL — SIGNIFICANT CHANGE UP (ref 3.8–10.5)
WBC # FLD AUTO: 4.23 K/UL — SIGNIFICANT CHANGE UP (ref 3.8–10.5)

## 2021-02-17 PROCEDURE — 84100 ASSAY OF PHOSPHORUS: CPT

## 2021-02-17 PROCEDURE — 84443 ASSAY THYROID STIM HORMONE: CPT

## 2021-02-17 PROCEDURE — 86769 SARS-COV-2 COVID-19 ANTIBODY: CPT

## 2021-02-17 PROCEDURE — 85610 PROTHROMBIN TIME: CPT

## 2021-02-17 PROCEDURE — 87635 SARS-COV-2 COVID-19 AMP PRB: CPT

## 2021-02-17 PROCEDURE — 36415 COLL VENOUS BLD VENIPUNCTURE: CPT

## 2021-02-17 PROCEDURE — U0005: CPT

## 2021-02-17 PROCEDURE — 80053 COMPREHEN METABOLIC PANEL: CPT

## 2021-02-17 PROCEDURE — 80076 HEPATIC FUNCTION PANEL: CPT

## 2021-02-17 PROCEDURE — 74174 CTA ABD&PLVS W/CONTRAST: CPT

## 2021-02-17 PROCEDURE — 80074 ACUTE HEPATITIS PANEL: CPT

## 2021-02-17 PROCEDURE — 85025 COMPLETE CBC W/AUTO DIFF WBC: CPT

## 2021-02-17 PROCEDURE — 88312 SPECIAL STAINS GROUP 1: CPT

## 2021-02-17 PROCEDURE — 80048 BASIC METABOLIC PNL TOTAL CA: CPT

## 2021-02-17 PROCEDURE — 99285 EMERGENCY DEPT VISIT HI MDM: CPT | Mod: 25

## 2021-02-17 PROCEDURE — 85730 THROMBOPLASTIN TIME PARTIAL: CPT

## 2021-02-17 PROCEDURE — 86850 RBC ANTIBODY SCREEN: CPT

## 2021-02-17 PROCEDURE — 83036 HEMOGLOBIN GLYCOSYLATED A1C: CPT

## 2021-02-17 PROCEDURE — 83735 ASSAY OF MAGNESIUM: CPT

## 2021-02-17 PROCEDURE — 88305 TISSUE EXAM BY PATHOLOGIST: CPT

## 2021-02-17 PROCEDURE — 85027 COMPLETE CBC AUTOMATED: CPT

## 2021-02-17 PROCEDURE — 82962 GLUCOSE BLOOD TEST: CPT

## 2021-02-17 PROCEDURE — 93005 ELECTROCARDIOGRAM TRACING: CPT

## 2021-02-17 PROCEDURE — 82728 ASSAY OF FERRITIN: CPT

## 2021-02-17 PROCEDURE — 86703 HIV-1/HIV-2 1 RESULT ANTBDY: CPT

## 2021-02-17 PROCEDURE — 96374 THER/PROPH/DIAG INJ IV PUSH: CPT

## 2021-02-17 PROCEDURE — 88305 TISSUE EXAM BY PATHOLOGIST: CPT | Mod: 26

## 2021-02-17 PROCEDURE — 83605 ASSAY OF LACTIC ACID: CPT

## 2021-02-17 PROCEDURE — 83690 ASSAY OF LIPASE: CPT

## 2021-02-17 PROCEDURE — 88312 SPECIAL STAINS GROUP 1: CPT | Mod: 26

## 2021-02-17 PROCEDURE — 86901 BLOOD TYPING SEROLOGIC RH(D): CPT

## 2021-02-17 PROCEDURE — 86900 BLOOD TYPING SEROLOGIC ABO: CPT

## 2021-02-17 PROCEDURE — 84484 ASSAY OF TROPONIN QUANT: CPT

## 2021-02-17 PROCEDURE — 80307 DRUG TEST PRSMV CHEM ANLYZR: CPT

## 2021-02-17 PROCEDURE — 82746 ASSAY OF FOLIC ACID SERUM: CPT

## 2021-02-17 PROCEDURE — 80061 LIPID PANEL: CPT

## 2021-02-17 PROCEDURE — 82607 VITAMIN B-12: CPT

## 2021-02-17 RX ORDER — FOLIC ACID 0.8 MG
1 TABLET ORAL DAILY
Refills: 0 | Status: DISCONTINUED | OUTPATIENT
Start: 2021-02-17 | End: 2021-02-17

## 2021-02-17 RX ORDER — PANTOPRAZOLE SODIUM 20 MG/1
40 TABLET, DELAYED RELEASE ORAL
Refills: 0 | Status: DISCONTINUED | OUTPATIENT
Start: 2021-02-17 | End: 2021-02-17

## 2021-02-17 RX ADMIN — Medication 2 MILLIGRAM(S): at 11:02

## 2021-02-17 RX ADMIN — Medication 50 MILLIGRAM(S): at 14:51

## 2021-02-17 RX ADMIN — Medication 105 MILLIGRAM(S): at 06:01

## 2021-02-17 RX ADMIN — Medication 2 MILLIGRAM(S): at 02:05

## 2021-02-17 RX ADMIN — Medication 2 MILLIGRAM(S): at 14:51

## 2021-02-17 RX ADMIN — Medication 2 MILLIGRAM(S): at 06:01

## 2021-02-17 RX ADMIN — Medication 50 MILLIGRAM(S): at 06:01

## 2021-02-17 RX ADMIN — PANTOPRAZOLE SODIUM 40 MILLIGRAM(S): 20 TABLET, DELAYED RELEASE ORAL at 06:01

## 2021-02-17 NOTE — CHART NOTE - NSCHARTNOTEFT_GEN_A_CORE
EVENT: Requesting AMA - now staying   HPI reviewed and copied from H&P    25 year old male patient from home, self ambulating, chronic Alc abuse,  with no significant PMHx presented to the ED c/o abdominal pain and vomiting x today. Pt is AAOX3 and mentions thar he was in his usual state of health until last 3 months and then developed intermittent vomiting immediately after taking his alcohol. He takes 2 pints of alcohol day x 3 yrs.  Pt with minimal PO intake secondary to  nausea and vomiting. Pt notes emesis initially to be  with food content and then goes to bright red blood tinged emesis and then light blood tinged emesis. He called the EMS due to persistent vomiting  Pt has been having recurring episodes for the past 3 months. He was admitted in [facility] yesterday for similar complaints. Pt tried to stop drinking yesterday and would end up drinking more whenever he would go into withdrawal.  Pt's last drink to be somewhere this morning( exact time n unknown). While being transported by the EMS he fell asleep and woke up with   tremors, clenched his girlfriend. Pt also have epigastric discomfort, described as burning.  Admitted for alcohol induced gastritis with hematemesis    2/16/2021 - Called to unit for patient planning to leave against medical advice. Patient admitted for alcohol induced gastritis with hematemesis, followed by GI Dr. Long, for EGD tomorrow (2/17/2021). Patient with order for NPO after midnight. As per patient he is hungry and want to eat, refusing food offered 2 sandwiches and apple juice before midnight. Patient advised he could buy food from the outside or have family/friend bring in food before midnight. ,NP [place] patient in length explaining the risk of leaving against medical advice but he insisted he is leaving and signed form, Dr. Head made aware.  Patient alert and oriented, CIWA 1, denies any confusion, hallucination, palpitations or tremors.    At midnight patient called NP states he is staying for procedure tomorrow. Explained treatment plan, NPO after midnight. Patient states he will be eating the sandwiches despite several minutes of going over his current treatment plan and reason for NPO status. Some food removed from the patient's bedside tablet.      Vital Signs Last 24 Hrs  T(C): 37.3 (16 Feb 2021 20:48), Max: 37.3 (16 Feb 2021 20:48)  T(F): 99.2 (16 Feb 2021 20:48), Max: 99.2 (16 Feb 2021 20:48)  HR: 70 (16 Feb 2021 20:48) (64 - 74)  BP: 128/76 (16 Feb 2021 20:48) (123/73 - 142/82)  BP(mean): --  RR: 16 (16 Feb 2021 20:48) (16 - 18)  SpO2: 100% (16 Feb 2021 20:48) (100% - 100%)    General:  Well developed, well nourished, alert and active, no pallor, NAD.  HEENT:    Normal appearance of conjunctiva, ears, nose, lips, oropharynx, and oral mucosa, anicteric.  Neck:  No masses, no asymmetry.  Cardiovascular:  RRR normal S1/S2, no murmur.  Respiratory:  CTA B/L, normal respiratory effort.   Abdominal:   soft, no masses or tenderness, normoactive BS, NT/ND, no HSM.  Extremities:   No clubbing or cyanosis, normal capillary refill, no edema.   Skin:   No rash, jaundice, lesions, eczema.   Musculoskeletal:  No joint swelling, erythema or tenderness.   Neuro: No focal deficits.    LABS:                        12.7   3.57  )-----------( 143      ( 16 Feb 2021 06:52 )             36.9   CARDIAC MARKERS ( 16 Feb 2021 06:52 )  <0.015 ng/mL / x     / x     / x     / x        02-16    139  |  102  |  6<L>  ----------------------------<  78  3.0<L>   |  29  |  0.92    Ca    9.1      16 Feb 2021 06:52  Phos  3.9     02-16  Mg     2.1     02-16    TPro  6.4  /  Alb  3.0<L>  /  TBili  1.1  /  DBili  x   /  AST  253<H>  /  ALT  143<H>  /  AlkPhos  97  02-16      PROBLEM:   1.  Hematemesis with nausea.    EGD scheduled for 2/17/2021  NPO after midnight - I explained to the patient the need to maintain NPO status and not to eat after midnight       2.  Alcohol withdrawal syndrome without complication.     Monitor for Withdrawal.   -cont Librium   -mon CIWA score and cont symptom triggered ativan   -cont MVI, folic acid and thiamine   -SW following

## 2021-02-17 NOTE — PROGRESS NOTE ADULT - PROBLEM SELECTOR PLAN 5
dvt ppx- hold chemo ppx given hematemesis  gi ppx- cont PPI
-dvt ppx- hold chemo ppx given hematemesis  -gi ppx- cont PPI

## 2021-02-17 NOTE — CHART NOTE - NSCHARTNOTEFT_GEN_A_CORE
EVENT: Pt states he has a family emergency and wants to leave the hospital. Discussed with Dr Head, plan is to discharge patient with ativan taper. S/p EGD with shows mild gastritis. Importance of inpatient treatment  for alcohol withdrawal  explained. Pt states he has a family emergency and will return.    BRIEF HPI: 25y old  Male who presents with a chief complaint of abd pain and vomiting secondary to alcohol abuse. S/p EGD which shows mild gastritis.    OBJECTIVE:  Vital Signs Last 24 Hrs  T(C): 36.9 (2021 19:32), Max: 37 (2021 19:02)  T(F): 98.4 (2021 19:32), Max: 98.6 (2021 19:02)  HR: 69 (:32) (54 - 80)  BP: 113/78 (:17) (107/62 - 122/91)  BP(mean): 96 (:17) (76 - 96)  RR: 16 (2021 19:32) (14 - 21)  SpO2: 100% (:32) (99% - 100%)    FOCUSED PHYSICAL EXAM:  NEURO: Alert, oriented X 4  RESP: Even, unlabored    LABS:                        13.0   4.23  )-----------( 125      ( 2021 08:16 )             37.4   CARDIAC MARKERS ( 2021 06:52 )  <0.015 ng/mL / x     / x     / x     / x            138  |  105  |  8   ----------------------------<  83  3.4<L>   |  26  |  0.86    Ca    8.6      2021 08:16  Phos  3.4     -  Mg     1.9         TPro  6.4  /  Alb  3.0<L>  /  TBili  0.8  /  DBili  x   /  AST  262<H>  /  ALT  163<H>  /  AlkPhos  84        EK/15/20 2024  Ventricular Rate 80 BPM  Atrial Rate 80 BPM  P-R Interval 146 ms  QRS Duration 84 ms  Q-T Interval 370 ms  QTC Calculation(Bazett) 426 ms  P Axis 67 degrees  R Axis 14 degrees  T Axis 37 degrees  Diagnosis Line Normal sinus rhythm  Normal ECG  Confirmed by EMILY SHETH, [facility] ([id number]) on 2021 11:46:35 AM    IMAGING:  EXAM:  CT ANGIO ABD PELV (W)AW IC                        PROCEDURE DATE:  2021    INTERPRETATION:  CLINICAL INFORMATION: Upper gastrointestinal bleeding  COMPARISON: None.  PROCEDURE:  CT of the Abdomen and Pelvis was performed with and without intravenous contrast.  Precontrast, Arterial and Delayed phases were performed.  Intravenous contrast: 95 ml Omnipaque 350. 5 ml discarded.  Oral contrast: None.  Sagittal and coronal 3-D reformats were performed.  FINDINGS:  LOWER CHEST: Clear lung bases. Distal esophageal wall thickening.  LIVER: Steatosis.  BILE DUCTS: Normal caliber.  GALLBLADDER: Within normal limits.  SPLEEN: Within normal limits.  PANCREAS: Within normal limits.  ADRENALS: Within normal limits.  KIDNEYS/URETERS: Within normal limits.  BLADDER: Within normal limits.  REPRODUCTIVE ORGANS: Prostate within normal limits.  BOWEL: No bowel obstruction. Appendix is normal. No areas of active intravenous contrast extravasation identified in the bowel.  PERITONEUM: No ascites.  VESSELS: Within normal limits.  RETROPERITONEUM/LYMPH NODES: No lymphadenopathy.  ABDOMINAL WALL: Fat-containing bilateral inguinal hernias.  BONES: Within normal limits.  IMPRESSION:  No CT evidence of active gastrointestinal bleeding.  Distal esophageal wall thickening, question esophagitis.  Hepatic steatosis.    ASSESSMENT: 25M. from home, self ambulating, chronic Alc abuse,  with no significant PMHx presents to the ED c/o abdominal pain and vomiting x today.   PLAN:   1. Discharge with instructions, verbalized understanding.  2. See discharge instruction    Discussed with BIGG Machado EVENT: Pt states he has a family emergency and has to leave the hospital. Discussed with Dr Head, plan is to discharge patient with ativan taper. S/p EGD with shows mild gastritis. Importance of inpatient treatment  for alcohol withdrawal  explained. Pt states he has a family emergency and will return.    BRIEF HPI: 25y old  Male who presents with a chief complaint of abd pain and vomiting secondary to alcohol abuse. S/p EGD which shows mild gastritis.    OBJECTIVE:  Vital Signs Last 24 Hrs  T(C): 36.9 (2021 19:32), Max: 37 (2021 19:02)  T(F): 98.4 (2021 19:32), Max: 98.6 (2021 19:02)  HR: 69 (:32) (54 - 80)  BP: 113/78 (:17) (107/62 - 122/91)  BP(mean): 96 (:17) (76 - 96)  RR: 16 (2021 19:32) (14 - 21)  SpO2: 100% (:32) (99% - 100%)    FOCUSED PHYSICAL EXAM:  NEURO: Alert, oriented X 4  RESP: Even, unlabored    LABS:                        13.0   4.23  )-----------( 125      ( 2021 08:16 )             37.4   CARDIAC MARKERS ( 2021 06:52 )  <0.015 ng/mL / x     / x     / x     / x            138  |  105  |  8   ----------------------------<  83  3.4<L>   |  26  |  0.86    Ca    8.6      2021 08:16  Phos  3.4     -  Mg     1.9         TPro  6.4  /  Alb  3.0<L>  /  TBili  0.8  /  DBili  x   /  AST  262<H>  /  ALT  163<H>  /  AlkPhos  84        EK/15/20 2024  Ventricular Rate 80 BPM  Atrial Rate 80 BPM  P-R Interval 146 ms  QRS Duration 84 ms  Q-T Interval 370 ms  QTC Calculation(Bazett) 426 ms  P Axis 67 degrees  R Axis 14 degrees  T Axis 37 degrees  Diagnosis Line Normal sinus rhythm  Normal ECG  Confirmed by EMILY SHETH, [facility] ([id number]) on 2021 11:46:35 AM    IMAGING:  EXAM:  CT ANGIO ABD PELV (W)AW IC                        PROCEDURE DATE:  2021    INTERPRETATION:  CLINICAL INFORMATION: Upper gastrointestinal bleeding  COMPARISON: None.  PROCEDURE:  CT of the Abdomen and Pelvis was performed with and without intravenous contrast.  Precontrast, Arterial and Delayed phases were performed.  Intravenous contrast: 95 ml Omnipaque 350. 5 ml discarded.  Oral contrast: None.  Sagittal and coronal 3-D reformats were performed.  FINDINGS:  LOWER CHEST: Clear lung bases. Distal esophageal wall thickening.  LIVER: Steatosis.  BILE DUCTS: Normal caliber.  GALLBLADDER: Within normal limits.  SPLEEN: Within normal limits.  PANCREAS: Within normal limits.  ADRENALS: Within normal limits.  KIDNEYS/URETERS: Within normal limits.  BLADDER: Within normal limits.  REPRODUCTIVE ORGANS: Prostate within normal limits.  BOWEL: No bowel obstruction. Appendix is normal. No areas of active intravenous contrast extravasation identified in the bowel.  PERITONEUM: No ascites.  VESSELS: Within normal limits.  RETROPERITONEUM/LYMPH NODES: No lymphadenopathy.  ABDOMINAL WALL: Fat-containing bilateral inguinal hernias.  BONES: Within normal limits.  IMPRESSION:  No CT evidence of active gastrointestinal bleeding.  Distal esophageal wall thickening, question esophagitis.  Hepatic steatosis.    ASSESSMENT: 25M. from home, self ambulating, chronic Alc abuse,  with no significant PMHx presents to the ED c/o abdominal pain and vomiting x today.   PLAN:   1. Discharge with instructions, verbalized understanding.  2. See discharge instruction    Discussed with BIGG Machado

## 2021-02-17 NOTE — CHART NOTE - NSCHARTNOTEFT_GEN_A_CORE
Esophagogastroduodenoscopy Report      Indication: Hematemesis       Referring MD:     Anesthesia: MAC    Consent:  Informed consent was obtained from the patient after providing any opportunity for questions    Procedure: The gastroscope was gently passed through the incisoral orifice into the oral cavity and under direct visualization the esophagus was intubated. The endoscope was passed down the esophagus, through the stomach and into duodenum . Color, texture, mucosa and anatomy of the esophagus, stomach, and duodenum were carefully examined with the scope. The patient tolerated the procedure well. After completion of the examination, the patient was transferred to the recovery room.   Preparation: NPO   Findings:     Oropharynx	Normal    Esophagus	Mild esophagitis     EG-junction	Normal    Cardia:	Normal.    Body:	Mild erythema and edema. Random biopsies taken [1]    Antrum:	Mild erythema and edema. Random biopsies taken [1]    Pylorus:	Normal.    Duodenal Bulb:	Normal     2nd portion:	Normal    3rd portion:	Normal.    EBL:0      Impression:  Mild esophagitis     Plan: 1-  Resume diet 2- ETOH cessation 3- Follow up pathology             Attending:       Hemanth Long M.D.   02-17-21 @ 18:50      PLEASE REFER TO PAPER CHART FOR PICTURES

## 2021-02-17 NOTE — PROGRESS NOTE ADULT - PROBLEM SELECTOR PLAN 3
extensive history of heavy alcohol abuse, pt agreeable for inpt rehab post discharge   cont Librium   monitor CIWA score and cont symptom triggered ativan   cont MVI, folic acid and thiamine   pt agree to go to rehab after discharge  SW following

## 2021-02-17 NOTE — DISCHARGE NOTE NURSING/CASE MANAGEMENT/SOCIAL WORK - PATIENT PORTAL LINK FT
You can access the FollowMyHealth Patient Portal offered by [facility] by registering at the following website: [email or web address]. By joining [employer]’s FollowMyHealth portal, you will also be able to view your health information using other applications (apps) compatible with our system.

## 2021-02-17 NOTE — CHART NOTE - NSCHARTNOTEFT_GEN_A_CORE
Pt discharged called from home requesting a facility closer to his home for detox. He wants the [facility] in [place] because it is closer. Will endorse to NP Tiffanie for f/u with . Pt phone number is [phone]. Pt discharged, called from home requesting a facility closer to his home for detox. He wants the [facility] rehab facility in [place] because it is closer. Will endorse to NP Tiffanie for f/u with . Pt phone number is [phone].

## 2021-02-17 NOTE — PROGRESS NOTE ADULT - PROBLEM SELECTOR PLAN 1
p/w vomiting and hematemesis, likely due to alcohol induced gastritis    Abdominal CT-A w/o active GI bleeding,    c/w PPI   tolerating advanced diet   EGD completed pending results   GI Dr. Long
-p/w vomiting and hematemesis, likely due to alcohol induced gastritis    -CT abd prelim unremarkable, f/u final report   -cont PPI   -tolerating advanced diet   -for EGD tomorrow   -NPO after midnight   -GI Dr. Long

## 2021-02-17 NOTE — PROGRESS NOTE ADULT - ASSESSMENT
Patient is 24 y/o male from home, self ambulating, chronic Alcohol abuse,  with no significant PMHx presents to the ED c/o abdominal pain and vomiting. Admitted for alcohol induced gastritis with hematemesis, followed by GI Dr. Long for scheduled EGD today. Abdominal CT angio with no evidence of active gastrointestinal bleeding. Distal esophageal wall thickening, question esophagitis and hepatic steatosis. Patient on Ativan 2mg q4h standing dose and Librium 50mg q 12h. SW onboard, patient agree to go to [facility] alcohol rehab after discharge.     Patient seen and examined at bedside, denies CP, SOB, mild epigastric pain, CIWA 2 this AM.
25M. from home, self ambulating, chronic Alc abuse,  with no significant PMHx presents to the ED c/o abdominal pain and vomiting. Admitted for alcohol induced gastritis with hematemesis, followed by GI Dr. Long, for EGD tomorrow

## 2021-02-17 NOTE — PROGRESS NOTE ADULT - SUBJECTIVE AND OBJECTIVE BOX
Patient is a 25y old  Male who presents with a chief complaint of abd pain and vomiting (16 Feb 2021 09:56)    OVERNIGHT EVENTS: no acute events overnight, CIWA score 2 this morning      REVIEW OF SYSTEMS:  CONSTITUTIONAL: No fever, chills  ENMT:  No difficulty hearing, no change in vision  NECK: No pain or stiffness  RESPIRATORY: No cough, SOB  CARDIOVASCULAR: No chest pain, palpitations  GASTROINTESTINAL: No abdominal pain. No nausea, vomiting, or diarrhea  GENITOURINARY: No dysuria  NEUROLOGICAL: No HA  SKIN: No itching, burning, rashes, or lesions   MUSCULOSKELETAL: No joint pain or swelling; No muscle, back, or extremity pain  PSYCHIATRIC: No depression, anxiety      T(C): 36.6 (02-16-21 @ 06:06), Max: 37.3 (02-15-21 @ 23:40)  HR: 64 (02-16-21 @ 06:06) (64 - 99)  BP: 123/73 (02-16-21 @ 06:06) (123/73 - 142/82)  RR: 18 (02-16-21 @ 06:06) (18 - 18)  SpO2: 100% (02-16-21 @ 06:06) (99% - 100%)  Wt(kg): --Vital Signs Last 24 Hrs  T(C): 36.6 (16 Feb 2021 06:06), Max: 37.3 (15 Feb 2021 23:40)  T(F): 97.8 (16 Feb 2021 06:06), Max: 99.1 (15 Feb 2021 23:40)  HR: 64 (16 Feb 2021 06:06) (64 - 99)  BP: 123/73 (16 Feb 2021 06:06) (123/73 - 142/82)  BP(mean): --  RR: 18 (16 Feb 2021 06:06) (18 - 18)  SpO2: 100% (16 Feb 2021 06:06) (99% - 100%)    MEDICATIONS  (STANDING):  folic acid 1 milliGRAM(s) Oral daily  multivitamin 1 Tablet(s) Oral daily  pantoprazole  Injectable 40 milliGRAM(s) IV Push two times a day  potassium chloride  10 mEq/100 mL IVPB 10 milliEquivalent(s) IV Intermittent every 1 hour  sodium chloride 0.9%. 1000 milliLiter(s) (85 mL/Hr) IV Continuous <Continuous>  thiamine IVPB 500 milliGRAM(s) IV Intermittent every 8 hours    MEDICATIONS  (PRN):  chlordiazePOXIDE 50 milliGRAM(s) Oral every 1 hour PRN Symptom-triggered: each CIWA -Ar score 8 or GREATER  LORazepam   Injectable 2 milliGRAM(s) IV Push every 2 hours PRN Agitation  ondansetron Injectable 4 milliGRAM(s) IV Push every 6 hours PRN Nausea and/or Vomiting      PHYSICAL EXAM:  GENERAL: NAD, lethargic   EYES: clear conjunctiva  ENMT: Moist mucous membranes  NECK: Supple, No JVD, Normal thyroid  CHEST/LUNG: Clear to auscultation bilaterally; No rales, rhonchi, wheezing, or rubs  HEART: S1, S2, Regular rate and rhythm  ABDOMEN: Soft, Nontender, Nondistended; Bowel sounds present  NEURO: Alert & Oriented X3  EXTREMITIES: No LE edema, no calf tenderness  SKIN: No rashes or lesions    Consultant(s) Notes Reviewed:  [x ] YES  [ ] NO  Care Discussed with Consultants/Other Providers [ x] YES  [ ] NO    LABS:                        12.7   3.57  )-----------( 143      ( 16 Feb 2021 06:52 )             36.9     02-16    139  |  102  |  6<L>  ----------------------------<  78  3.0<L>   |  29  |  0.92    Ca    9.1      16 Feb 2021 06:52  Phos  3.9     02-16  Mg     2.1     02-16    TPro  6.4  /  Alb  3.0<L>  /  TBili  1.1  /  DBili  x   /  AST  253<H>  /  ALT  143<H>  /  AlkPhos  97  02-16    PTT - ( 16 Feb 2021 06:52 )  PTT:29.1 sec  CAPILLARY BLOOD GLUCOSE    RADIOLOGY & ADDITIONAL TESTS:    < from: CT Angio Abdomen and Pelvis w/ IV Cont (02.16.21 @ 00:25) >  ******PRELIMINARY REPORT******    ******PRELIMINARY REPORT******          EXAM:  CT ANGIO ABD PELV (W)AW IC                            PROCEDURE DATE:  02/16/2021    ******PRELIMINARY REPORT******    ******PRELIMINARY REPORT******              INTERPRETATION:  No evidence of active gastrointestinal bleeding. Thickening of distal esophageal walls concerning for an esophagitis.    < end of copied text >        Imaging Personally Reviewed:  [ ] YES  [ ] NO

## 2021-02-17 NOTE — PROGRESS NOTE ADULT - SUBJECTIVE AND OBJECTIVE BOX
Patient is a 25y old  Male who presents with a chief complaint of abd pain and vomiting (16 Feb 2021 17:53)    PATIENT IS SEEN AND EXAMINED IN MEDICAL FLOOR.    ALLERGIES:  No Known Allergies    VITALS:    Vital Signs Last 24 Hrs  T(C): 36.2 (17 Feb 2021 11:42), Max: 37.3 (16 Feb 2021 20:48)  T(F): 97.2 (17 Feb 2021 11:42), Max: 99.2 (16 Feb 2021 20:48)  HR: 54 (17 Feb 2021 11:42) (54 - 70)  BP: 116/80 (17 Feb 2021 11:42) (111/74 - 128/76)  BP(mean): --  RR: 14 (17 Feb 2021 11:42) (14 - 18)  SpO2: 100% (17 Feb 2021 11:42) (100% - 100%)    LABS:    CBC Full  -  ( 17 Feb 2021 08:16 )  WBC Count : 4.23 K/uL  RBC Count : 3.62 M/uL  Hemoglobin : 13.0 g/dL  Hematocrit : 37.4 %  Platelet Count - Automated : 125 K/uL  Mean Cell Volume : 103.3 fl  Mean Cell Hemoglobin : 35.9 pg  Mean Cell Hemoglobin Concentration : 34.8 gm/dL  Auto Neutrophil # : x  Auto Lymphocyte # : x  Auto Monocyte # : x  Auto Eosinophil # : x  Auto Basophil # : x  Auto Neutrophil % : x  Auto Lymphocyte % : x  Auto Monocyte % : x  Auto Eosinophil % : x  Auto Basophil % : x    PT/INR - ( 17 Feb 2021 08:16 )   PT: 12.3 sec;   INR: 1.04 ratio         PTT - ( 17 Feb 2021 08:16 )  PTT:28.9 sec  02-17    138  |  105  |  8   ----------------------------<  83  3.4<L>   |  26  |  0.86    Ca    8.6      17 Feb 2021 08:16  Phos  3.4     02-17  Mg     1.9     02-17    TPro  6.4  /  Alb  3.0<L>  /  TBili  0.8  /  DBili  x   /  AST  262<H>  /  ALT  163<H>  /  AlkPhos  84  02-17    CAPILLARY BLOOD GLUCOSE        CARDIAC MARKERS ( 16 Feb 2021 06:52 )  <0.015 ng/mL / x     / x     / x     / x          LIVER FUNCTIONS - ( 17 Feb 2021 08:16 )  Alb: 3.0 g/dL / Pro: 6.4 g/dL / ALK PHOS: 84 U/L / ALT: 163 U/L DA / AST: 262 U/L / GGT: x           Creatinine Trend: 0.86<--, 0.92<--, 0.97<--  I&O's Summary    MEDICATIONS:    MEDICATIONS  (STANDING):  chlordiazePOXIDE 50 milliGRAM(s) Oral every 8 hours  folic acid 1 milliGRAM(s) Oral daily  multivitamin 1 Tablet(s) Oral daily  pantoprazole  Injectable 40 milliGRAM(s) IV Push two times a day  sodium chloride 0.9%. 1000 milliLiter(s) (85 mL/Hr) IV Continuous <Continuous>  thiamine IVPB 500 milliGRAM(s) IV Intermittent every 8 hours      MEDICATIONS  (PRN):  LORazepam   Injectable 2 milliGRAM(s) IV Push every 2 hours PRN Agitation  ondansetron Injectable 4 milliGRAM(s) IV Push every 6 hours PRN Nausea and/or Vomiting      REVIEW OF SYSTEMS:                           ALL ROS DONE [ X   ]    CONSTITUTIONAL:  LETHARGIC [   ], FEVER [   ], UNRESPONSIVE [   ]  CVS:  CP  [   ], SOB, [   ], PALPITATIONS [   ], DIZZYNESS [   ]  RS: COUGH [   ], SPUTUM [   ]  GI: ABDOMINAL PAIN [   ], NAUSEA [   ], VOMITINGS [   ], DIARRHEA [   ], CONSTIPATION [   ]  :  DYSURIA [   ], NOCTURIA [   ], INCREASED FREQUENCY [   ], DRIBLING [   ],  SKELETAL: PAINFUL JOINTS [   ], SWOLLEN JOINTS [   ], NECK ACHE [   ], LOW BACK ACHE [   ],  SKIN : ULCERS [   ], RASH [   ], ITCHING [   ]  CNS: HEAD ACHE [   ], DOUBLE VISION [   ], BLURRED VISION [   ], AMS / CONFUSION [   ], SEIZURES [   ], WEAKNESS [   ],TINGLING / NUMBNESS [   ]    PHYSICAL EXAMINATION:  GENERAL APPEARANCE: PATIENT IS AGITATED - NO VISIBLE TREMORS, DIAPHORESIS  HE IS REFUSING FURTHER EVALUATION    RADIOLOGY :    EXAM:  CT ANGIO ABD PELV (W)AW IC                            PROCEDURE DATE:  02/16/2021          INTERPRETATION:  CLINICAL INFORMATION: Upper gastrointestinal bleeding    COMPARISON: None.    PROCEDURE:  CT of the Abdomen and Pelvis was performed with and without intravenous contrast.  Precontrast, Arterial and Delayed phases were performed.  Intravenous contrast: 95 ml Omnipaque 350. 5 ml discarded.  Oral contrast: None.  Sagittal and coronal 3-D reformats were performed.    FINDINGS:  LOWER CHEST: Clear lung bases. Distal esophageal wall thickening.    LIVER: Steatosis.  BILE DUCTS: Normal caliber.  GALLBLADDER: Within normal limits.  SPLEEN: Within normal limits.  PANCREAS: Within normal limits.  ADRENALS: Within normal limits.  KIDNEYS/URETERS: Within normal limits.    BLADDER: Within normal limits.  REPRODUCTIVE ORGANS: Prostate within normal limits.    BOWEL: No bowel obstruction. Appendix is normal. No areas of active intravenous contrast extravasation identified in the bowel.  PERITONEUM: No ascites.  VESSELS: Within normal limits.  RETROPERITONEUM/LYMPH NODES: No lymphadenopathy.  ABDOMINAL WALL: Fat-containing bilateral inguinal hernias.  BONES: Within normal limits.    IMPRESSION:  No CT evidence of active gastrointestinal bleeding.  Distal esophageal wall thickening, question esophagitis.  Hepatic steatosis.      ASSESSMENT :     Hematemesis        PLAN:  HPI:  25M. from home, self ambulating, chronic Alc abuse,  with no significant PMHx presents to the ED c/o abdominal pain and vomiting x today. Pt is AAOX3 and mentions thar he was in his usual state of health until last 3 months and then developed intermittent vomiting immediately after taking his alcohol. He takes 2 pints of alcohol day x 3 yrs.  Pt with minimal PO intake secondary to  nausea and vomiting. Pt notes emesis initially to be  with food content and then goes to bright red blood tinged emesis and then light blood tinged emesis. He called the EMS due to persistent vomitings  Pt has been having recurring episodes for the past 3 months. He was admitted in [facility] yesterday for similar complaints. Pt tried to stop drinking yesterday and would end up drinking more whenever he would go into withdrawal.  Pt's last drink to be somewhere this morning( exact time n unknown). While being transported by the EMS he fell asleep and woke up with   tremors, clenched his girlfriend. Pt also have epigastric discomfort, described as burning.   Pt denies any other complaints  He takes 2 pints of alcohol day x 3 yrs.    In the ED,  Pt appears comfortable, no signs of distress, AAOX3   Vitals- 137/86, Hr 85, afebrile   Hb 9.3  AST// 185  bal 97     (15 Feb 2021 18:25)    PLAN:     # PATIENT IS AGITATED AND DEMONSTRATING AGGRESSIVE BEHAVIOR DUE TO HIS CONCERNS OVER THE DELAY IN HIS EGD TIME SLOT - NURSING STAFF, MYSELF AND GASTROENTEROLOGIST ALL COUNSELLED PATIENT ON THE IMPORTANCE OF THE PROCEDURE [INCLUDING THE RISK FOR BLEEDING, PERFORATION AND DEATH]. THE PATIENT REMAINS AGGRESSIVE DESPITE THE COUNSELLING AND IS REFUSING EVALUATION [PHYSICAL EXAM]  - PATIENT'S PARTNER WAS ON THE PHONE DURING ENCOUNTER  # HEMATEMESIS S/P RECURRENT EPISODES OF EMESIS + WRETCHING W/ SUSPECTED ESOPHAGITIS - REVIEWED CT ABDOMEN, MONITORING HGB, NPO, IV ANTIEMETICS, PPI BID, GASTROENTEROLOGY CONSULT  - PLAN FOR EGD 02/16  # ALCOHOL INTOXICATION, ALCOHOL ABUSE - MONITORING CIWA SCORE, LIBRIUM, PRN ATIVAN, MVI, FOLATE, THIAMINE  - COUNSELLED >10 MINS  - PLAN FOR INPATIENT REHABILITATION  # TRANSAMINITIS W/ HEPATIC STEATOSIS [FATTY LIVER DISEASE] - SUSPECT S/T ALCOHOL ABUSE - F/U U/S ABDOMEN, HEPATITIS PANEL, REVIEWED CT ABDOMEN  # HYPOKALEMIA - REPLETING WITH SUPPLEMENT  # GI PPX    Patient is a 25y old  Male who presents with a chief complaint of abd pain and vomiting (16 Feb 2021 17:53)    PATIENT IS SEEN AND EXAMINED IN MEDICAL FLOOR.    ALLERGIES:  No Known Allergies    VITALS:    Vital Signs Last 24 Hrs  T(C): 36.2 (17 Feb 2021 11:42), Max: 37.3 (16 Feb 2021 20:48)  T(F): 97.2 (17 Feb 2021 11:42), Max: 99.2 (16 Feb 2021 20:48)  HR: 54 (17 Feb 2021 11:42) (54 - 70)  BP: 116/80 (17 Feb 2021 11:42) (111/74 - 128/76)  BP(mean): --  RR: 14 (17 Feb 2021 11:42) (14 - 18)  SpO2: 100% (17 Feb 2021 11:42) (100% - 100%)    LABS:    CBC Full  -  ( 17 Feb 2021 08:16 )  WBC Count : 4.23 K/uL  RBC Count : 3.62 M/uL  Hemoglobin : 13.0 g/dL  Hematocrit : 37.4 %  Platelet Count - Automated : 125 K/uL  Mean Cell Volume : 103.3 fl  Mean Cell Hemoglobin : 35.9 pg  Mean Cell Hemoglobin Concentration : 34.8 gm/dL  Auto Neutrophil # : x  Auto Lymphocyte # : x  Auto Monocyte # : x  Auto Eosinophil # : x  Auto Basophil # : x  Auto Neutrophil % : x  Auto Lymphocyte % : x  Auto Monocyte % : x  Auto Eosinophil % : x  Auto Basophil % : x    PT/INR - ( 17 Feb 2021 08:16 )   PT: 12.3 sec;   INR: 1.04 ratio         PTT - ( 17 Feb 2021 08:16 )  PTT:28.9 sec  02-17    138  |  105  |  8   ----------------------------<  83  3.4<L>   |  26  |  0.86    Ca    8.6      17 Feb 2021 08:16  Phos  3.4     02-17  Mg     1.9     02-17    TPro  6.4  /  Alb  3.0<L>  /  TBili  0.8  /  DBili  x   /  AST  262<H>  /  ALT  163<H>  /  AlkPhos  84  02-17    CAPILLARY BLOOD GLUCOSE        CARDIAC MARKERS ( 16 Feb 2021 06:52 )  <0.015 ng/mL / x     / x     / x     / x          LIVER FUNCTIONS - ( 17 Feb 2021 08:16 )  Alb: 3.0 g/dL / Pro: 6.4 g/dL / ALK PHOS: 84 U/L / ALT: 163 U/L DA / AST: 262 U/L / GGT: x           Creatinine Trend: 0.86<--, 0.92<--, 0.97<--  I&O's Summary    MEDICATIONS:    MEDICATIONS  (STANDING):  chlordiazePOXIDE 50 milliGRAM(s) Oral every 8 hours  folic acid 1 milliGRAM(s) Oral daily  multivitamin 1 Tablet(s) Oral daily  pantoprazole  Injectable 40 milliGRAM(s) IV Push two times a day  sodium chloride 0.9%. 1000 milliLiter(s) (85 mL/Hr) IV Continuous <Continuous>  thiamine IVPB 500 milliGRAM(s) IV Intermittent every 8 hours      MEDICATIONS  (PRN):  LORazepam   Injectable 2 milliGRAM(s) IV Push every 2 hours PRN Agitation  ondansetron Injectable 4 milliGRAM(s) IV Push every 6 hours PRN Nausea and/or Vomiting      REVIEW OF SYSTEMS:                           ALL ROS DONE [ X   ]    CONSTITUTIONAL:  LETHARGIC [   ], FEVER [   ], UNRESPONSIVE [   ]  CVS:  CP  [   ], SOB, [   ], PALPITATIONS [   ], DIZZYNESS [   ]  RS: COUGH [   ], SPUTUM [   ]  GI: ABDOMINAL PAIN [   ], NAUSEA [   ], VOMITINGS [   ], DIARRHEA [   ], CONSTIPATION [   ]  :  DYSURIA [   ], NOCTURIA [   ], INCREASED FREQUENCY [   ], DRIBLING [   ],  SKELETAL: PAINFUL JOINTS [   ], SWOLLEN JOINTS [   ], NECK ACHE [   ], LOW BACK ACHE [   ],  SKIN : ULCERS [   ], RASH [   ], ITCHING [   ]  CNS: HEAD ACHE [   ], DOUBLE VISION [   ], BLURRED VISION [   ], AMS / CONFUSION [   ], SEIZURES [   ], WEAKNESS [   ],TINGLING / NUMBNESS [   ]    PHYSICAL EXAMINATION:  GENERAL APPEARANCE: PATIENT IS AGITATED - NO VISIBLE TREMORS, DIAPHORESIS  HE IS REFUSING FURTHER EVALUATION    RADIOLOGY :    EXAM:  CT ANGIO ABD PELV (W)AW IC                            PROCEDURE DATE:  02/16/2021          INTERPRETATION:  CLINICAL INFORMATION: Upper gastrointestinal bleeding    COMPARISON: None.    PROCEDURE:  CT of the Abdomen and Pelvis was performed with and without intravenous contrast.  Precontrast, Arterial and Delayed phases were performed.  Intravenous contrast: 95 ml Omnipaque 350. 5 ml discarded.  Oral contrast: None.  Sagittal and coronal 3-D reformats were performed.    FINDINGS:  LOWER CHEST: Clear lung bases. Distal esophageal wall thickening.    LIVER: Steatosis.  BILE DUCTS: Normal caliber.  GALLBLADDER: Within normal limits.  SPLEEN: Within normal limits.  PANCREAS: Within normal limits.  ADRENALS: Within normal limits.  KIDNEYS/URETERS: Within normal limits.    BLADDER: Within normal limits.  REPRODUCTIVE ORGANS: Prostate within normal limits.    BOWEL: No bowel obstruction. Appendix is normal. No areas of active intravenous contrast extravasation identified in the bowel.  PERITONEUM: No ascites.  VESSELS: Within normal limits.  RETROPERITONEUM/LYMPH NODES: No lymphadenopathy.  ABDOMINAL WALL: Fat-containing bilateral inguinal hernias.  BONES: Within normal limits.    IMPRESSION:  No CT evidence of active gastrointestinal bleeding.  Distal esophageal wall thickening, question esophagitis.  Hepatic steatosis.      ASSESSMENT :     Hematemesis        PLAN:  HPI:  25M. from home, self ambulating, chronic Alc abuse,  with no significant PMHx presents to the ED c/o abdominal pain and vomiting x today. Pt is AAOX3 and mentions thar he was in his usual state of health until last 3 months and then developed intermittent vomiting immediately after taking his alcohol. He takes 2 pints of alcohol day x 3 yrs.  Pt with minimal PO intake secondary to  nausea and vomiting. Pt notes emesis initially to be  with food content and then goes to bright red blood tinged emesis and then light blood tinged emesis. He called the EMS due to persistent vomitings  Pt has been having recurring episodes for the past 3 months. He was admitted in [facility] yesterday for similar complaints. Pt tried to stop drinking yesterday and would end up drinking more whenever he would go into withdrawal.  Pt's last drink to be somewhere this morning( exact time n unknown). While being transported by the EMS he fell asleep and woke up with   tremors, clenched his girlfriend. Pt also have epigastric discomfort, described as burning.   Pt denies any other complaints  He takes 2 pints of alcohol day x 3 yrs.    In the ED,  Pt appears comfortable, no signs of distress, AAOX3   Vitals- 137/86, Hr 85, afebrile   Hb 9.3  AST// 185  bal 97     (15 Feb 2021 18:25)    PLAN:     # PATIENT IS AGITATED AND DEMONSTRATING AGGRESSIVE BEHAVIOR DUE TO HIS CONCERNS OVER THE DELAY IN HIS EGD TIME SLOT - NURSING STAFF, MYSELF AND GASTROENTEROLOGIST ALL COUNSELLED PATIENT ON THE IMPORTANCE OF THE PROCEDURE [INCLUDING THE RISK FOR BLEEDING, PERFORATION AND DEATH]. THE PATIENT REMAINS AGGRESSIVE DESPITE THE COUNSELLING AND IS REFUSING EVALUATION [PHYSICAL EXAM]  - PATIENT'S PARTNER WAS ON THE PHONE DURING ENCOUNTER  - CASE D/W NURSING MANAGER [ANTHONY] AND DR. ZELDA AGUSTIN - IF PATIENT REMAINS IN HOSPITAL TOMORROW - PLAN FOR TRANSFER OF CARE TO THE HOSPITALIST SERVICE  # HEMATEMESIS S/P RECURRENT EPISODES OF EMESIS + WRETCHING W/ SUSPECTED ESOPHAGITIS - REVIEWED CT ABDOMEN, MONITORING HGB, NPO, IV ANTIEMETICS, PPI BID, GASTROENTEROLOGY CONSULT  - PLAN FOR EGD 02/16  # ALCOHOL INTOXICATION, ALCOHOL ABUSE - MONITORING CIWA SCORE, LIBRIUM, PRN ATIVAN, MVI, FOLATE, THIAMINE  - COUNSELLED >10 MINS  - PLAN FOR INPATIENT REHABILITATION  # TRANSAMINITIS W/ HEPATIC STEATOSIS [FATTY LIVER DISEASE] - SUSPECT S/T ALCOHOL ABUSE - F/U U/S ABDOMEN, HEPATITIS PANEL, REVIEWED CT ABDOMEN  # HYPOKALEMIA - REPLETING WITH SUPPLEMENT  # GI PPX

## 2021-02-19 LAB — SURGICAL PATHOLOGY STUDY: SIGNIFICANT CHANGE UP

## 2021-02-22 LAB — DRUG SCREEN, SERUM: ABNORMAL

## 2021-03-20 ENCOUNTER — EMERGENCY (EMERGENCY)
Facility: HOSPITAL | Age: 26
LOS: 1 days | Discharge: HOME | End: 2021-03-20
Attending: STUDENT IN AN ORGANIZED HEALTH CARE EDUCATION/TRAINING PROGRAM
Payer: MEDICAID

## 2021-03-20 VITALS
HEART RATE: 102 BPM | TEMPERATURE: 98 F | WEIGHT: 188.94 LBS | SYSTOLIC BLOOD PRESSURE: 151 MMHG | DIASTOLIC BLOOD PRESSURE: 100 MMHG | RESPIRATION RATE: 16 BRPM | OXYGEN SATURATION: 95 %

## 2021-03-20 VITALS
HEART RATE: 92 BPM | TEMPERATURE: 98 F | OXYGEN SATURATION: 99 % | DIASTOLIC BLOOD PRESSURE: 89 MMHG | SYSTOLIC BLOOD PRESSURE: 152 MMHG | RESPIRATION RATE: 16 BRPM

## 2021-03-20 LAB
ALBUMIN SERPL ELPH-MCNC: 3.9 G/DL — SIGNIFICANT CHANGE UP (ref 3.5–5)
ALP SERPL-CCNC: 109 U/L — SIGNIFICANT CHANGE UP (ref 40–120)
ALT FLD-CCNC: 262 U/L DA — HIGH (ref 10–60)
ANION GAP SERPL CALC-SCNC: 13 MMOL/L — SIGNIFICANT CHANGE UP (ref 5–17)
APTT BLD: 28.6 SEC — SIGNIFICANT CHANGE UP (ref 27.5–35.5)
AST SERPL-CCNC: 404 U/L — HIGH (ref 10–40)
BASOPHILS # BLD AUTO: 0.02 K/UL — SIGNIFICANT CHANGE UP (ref 0–0.2)
BASOPHILS NFR BLD AUTO: 0.6 % — SIGNIFICANT CHANGE UP (ref 0–2)
BILIRUB SERPL-MCNC: 0.9 MG/DL — SIGNIFICANT CHANGE UP (ref 0.2–1.2)
BUN SERPL-MCNC: 10 MG/DL — SIGNIFICANT CHANGE UP (ref 7–18)
CALCIUM SERPL-MCNC: 9 MG/DL — SIGNIFICANT CHANGE UP (ref 8.4–10.5)
CHLORIDE SERPL-SCNC: 96 MMOL/L — SIGNIFICANT CHANGE UP (ref 96–108)
CO2 SERPL-SCNC: 29 MMOL/L — SIGNIFICANT CHANGE UP (ref 22–31)
CREAT SERPL-MCNC: 0.89 MG/DL — SIGNIFICANT CHANGE UP (ref 0.5–1.3)
EOSINOPHIL # BLD AUTO: 0.01 K/UL — SIGNIFICANT CHANGE UP (ref 0–0.5)
EOSINOPHIL NFR BLD AUTO: 0.3 % — SIGNIFICANT CHANGE UP (ref 0–6)
ETHANOL SERPL-MCNC: 189 MG/DL — HIGH (ref 0–10)
GLUCOSE SERPL-MCNC: 78 MG/DL — SIGNIFICANT CHANGE UP (ref 70–99)
HCT VFR BLD CALC: 38.5 % — LOW (ref 39–50)
HGB BLD-MCNC: 13.6 G/DL — SIGNIFICANT CHANGE UP (ref 13–17)
IMM GRANULOCYTES NFR BLD AUTO: 0.3 % — SIGNIFICANT CHANGE UP (ref 0–1.5)
INR BLD: 1.02 RATIO — SIGNIFICANT CHANGE UP (ref 0.88–1.16)
LIDOCAIN IGE QN: 187 U/L — SIGNIFICANT CHANGE UP (ref 73–393)
LYMPHOCYTES # BLD AUTO: 1.2 K/UL — SIGNIFICANT CHANGE UP (ref 1–3.3)
LYMPHOCYTES # BLD AUTO: 36.8 % — SIGNIFICANT CHANGE UP (ref 13–44)
MCHC RBC-ENTMCNC: 35.3 GM/DL — SIGNIFICANT CHANGE UP (ref 32–36)
MCHC RBC-ENTMCNC: 35.6 PG — HIGH (ref 27–34)
MCV RBC AUTO: 100.8 FL — HIGH (ref 80–100)
MONOCYTES # BLD AUTO: 0.54 K/UL — SIGNIFICANT CHANGE UP (ref 0–0.9)
MONOCYTES NFR BLD AUTO: 16.6 % — HIGH (ref 2–14)
NEUTROPHILS # BLD AUTO: 1.48 K/UL — LOW (ref 1.8–7.4)
NEUTROPHILS NFR BLD AUTO: 45.4 % — SIGNIFICANT CHANGE UP (ref 43–77)
NRBC # BLD: 0 /100 WBCS — SIGNIFICANT CHANGE UP (ref 0–0)
PLATELET # BLD AUTO: 147 K/UL — LOW (ref 150–400)
POTASSIUM SERPL-MCNC: 3.2 MMOL/L — LOW (ref 3.5–5.3)
POTASSIUM SERPL-SCNC: 3.2 MMOL/L — LOW (ref 3.5–5.3)
PROT SERPL-MCNC: 8 G/DL — SIGNIFICANT CHANGE UP (ref 6–8.3)
PROTHROM AB SERPL-ACNC: 12.1 SEC — SIGNIFICANT CHANGE UP (ref 10.6–13.6)
RBC # BLD: 3.82 M/UL — LOW (ref 4.2–5.8)
RBC # FLD: 12.1 % — SIGNIFICANT CHANGE UP (ref 10.3–14.5)
SODIUM SERPL-SCNC: 138 MMOL/L — SIGNIFICANT CHANGE UP (ref 135–145)
WBC # BLD: 3.26 K/UL — LOW (ref 3.8–10.5)
WBC # FLD AUTO: 3.26 K/UL — LOW (ref 3.8–10.5)

## 2021-03-20 PROCEDURE — 83690 ASSAY OF LIPASE: CPT

## 2021-03-20 PROCEDURE — 85025 COMPLETE CBC W/AUTO DIFF WBC: CPT

## 2021-03-20 PROCEDURE — 96375 TX/PRO/DX INJ NEW DRUG ADDON: CPT

## 2021-03-20 PROCEDURE — 85730 THROMBOPLASTIN TIME PARTIAL: CPT

## 2021-03-20 PROCEDURE — 99284 EMERGENCY DEPT VISIT MOD MDM: CPT

## 2021-03-20 PROCEDURE — 36415 COLL VENOUS BLD VENIPUNCTURE: CPT

## 2021-03-20 PROCEDURE — 80053 COMPREHEN METABOLIC PANEL: CPT

## 2021-03-20 PROCEDURE — 80307 DRUG TEST PRSMV CHEM ANLYZR: CPT

## 2021-03-20 PROCEDURE — 96374 THER/PROPH/DIAG INJ IV PUSH: CPT

## 2021-03-20 PROCEDURE — 99284 EMERGENCY DEPT VISIT MOD MDM: CPT | Mod: 25

## 2021-03-20 PROCEDURE — 85610 PROTHROMBIN TIME: CPT

## 2021-03-20 RX ORDER — ONDANSETRON 8 MG/1
4 TABLET, FILM COATED ORAL ONCE
Refills: 0 | Status: COMPLETED | OUTPATIENT
Start: 2021-03-20 | End: 2021-03-20

## 2021-03-20 RX ORDER — FAMOTIDINE 10 MG/ML
20 INJECTION INTRAVENOUS ONCE
Refills: 0 | Status: COMPLETED | OUTPATIENT
Start: 2021-03-20 | End: 2021-03-20

## 2021-03-20 RX ORDER — SODIUM CHLORIDE 9 MG/ML
1000 INJECTION INTRAMUSCULAR; INTRAVENOUS; SUBCUTANEOUS ONCE
Refills: 0 | Status: COMPLETED | OUTPATIENT
Start: 2021-03-20 | End: 2021-03-20

## 2021-03-20 RX ADMIN — FAMOTIDINE 20 MILLIGRAM(S): 10 INJECTION INTRAVENOUS at 14:34

## 2021-03-20 RX ADMIN — ONDANSETRON 4 MILLIGRAM(S): 8 TABLET, FILM COATED ORAL at 14:35

## 2021-03-20 RX ADMIN — SODIUM CHLORIDE 1000 MILLILITER(S): 9 INJECTION INTRAMUSCULAR; INTRAVENOUS; SUBCUTANEOUS at 14:38

## 2021-03-20 NOTE — ED ADULT NURSE NOTE - OBJECTIVE STATEMENT
Pt AOx4, ambulatory, c/o "I am withdrawing from alcohol". Pt has a Steady gait. No signs of distress noted. Pt denies n/v, HA, dizziness. Pt c/o vomiting and epigastric burn sensation. No tremors noted. Pt refused blood test or CT, requesting medications only.

## 2021-03-20 NOTE — ED PROVIDER NOTE - PROGRESS NOTE DETAILS
Patient informed that we will perform labs, administer meds and reassess. Patient asked what bloodwork is for, refused ekg/fsg. Patient then got dressed and left ED, stating that he was not receiving what he wanted, calling staff ignorant and stated that he will go to another hospital. Of note, patient mentioned that this was the 3rd hospital he visited in past 2 days for same complaint. Patient marked as eloped. Patient had another episode nbnb emesis in waiting room. I went out to talk to him and convinced him to return to ED for likely alcoholic gastritis management. Patient now amenable to treatment. Labs reviewed. lipase normal. transaminitis near baseline for patient. hgb wnl.   alcohol 189. patient now admitting to drinking excessive alcohol last night. states he feels improved, wants meal tray and wants to leave. patient has had no further emesis episodes in ED.   repeat exam benign. aaox3, normal gait, mentating clearly. stable for dc. states he has f/up info for shelter and rehab facility.

## 2021-03-20 NOTE — ED PROVIDER NOTE - PATIENT PORTAL LINK FT
You can access the FollowMyHealth Patient Portal offered by [facility] by registering at the following website: [email or web address]. By joining [employer]’s FollowMyHealth portal, you will also be able to view your health information using other applications (apps) compatible with our system.

## 2021-03-20 NOTE — ED PROVIDER NOTE - CARE PLAN
Principal Discharge DX:	Alcohol dependence   Principal Discharge DX:	Alcoholic gastritis without bleeding, unspecified chronicity  Secondary Diagnosis:	Alcohol dependence

## 2021-03-20 NOTE — ED PROVIDER NOTE - OBJECTIVE STATEMENT
24yo M pmhx etoh dependence p/w emesis. Patient states that he has been experiencing epigastric burning sensation and n/v with streaks of blood in emesis this morning, prompting visit to ED. Per patient, he is looking for detox program and needs ativan for withdrawal. Patient believes that he is withdrawing currently.   Of note, patient was admitted last month for detox and same complaint, patient left AMA and never followed up with shelter or outpt detox rehab program placement. Patient states that he was unable to go. Patient otherwise repeatedly stating that he needs medications like he received last time for withdrawal. Patient denies f/c, syncope, cp, sob, back pain, diarrhea/constipation, tremors, hallucinations or other complaints.

## 2021-03-20 NOTE — ED PROVIDER NOTE - CLINICAL SUMMARY MEDICAL DECISION MAKING FREE TEXT BOX
24yo M pmhx alcohol dependence, previous noncompliant w rehab facility, admitted last month p/w n/v, claim of alcohol withdrawal and request for med management of withdrawal. no withdrawal sxs, no tongue fasciculations, tremors, agitation, hallucinations. VS wnl.   labs, meds, reassess.

## 2021-06-11 ENCOUNTER — EMERGENCY (EMERGENCY)
Facility: HOSPITAL | Age: 26
LOS: 1 days | Discharge: HOME | End: 2021-06-11
Attending: EMERGENCY MEDICINE
Payer: MEDICAID

## 2021-06-11 VITALS
TEMPERATURE: 98 F | DIASTOLIC BLOOD PRESSURE: 90 MMHG | SYSTOLIC BLOOD PRESSURE: 143 MMHG | RESPIRATION RATE: 18 BRPM | OXYGEN SATURATION: 95 % | HEART RATE: 87 BPM

## 2021-06-11 VITALS
DIASTOLIC BLOOD PRESSURE: 90 MMHG | TEMPERATURE: 98 F | SYSTOLIC BLOOD PRESSURE: 153 MMHG | OXYGEN SATURATION: 98 % | HEART RATE: 99 BPM | RESPIRATION RATE: 18 BRPM

## 2021-06-11 LAB
ALBUMIN SERPL ELPH-MCNC: 3.9 G/DL — SIGNIFICANT CHANGE UP (ref 3.5–5)
ALP SERPL-CCNC: 92 U/L — SIGNIFICANT CHANGE UP (ref 40–120)
ALT FLD-CCNC: 22 U/L DA — SIGNIFICANT CHANGE UP (ref 10–60)
ANION GAP SERPL CALC-SCNC: 8 MMOL/L — SIGNIFICANT CHANGE UP (ref 5–17)
AST SERPL-CCNC: 47 U/L — HIGH (ref 10–40)
BASOPHILS # BLD AUTO: 0.04 K/UL — SIGNIFICANT CHANGE UP (ref 0–0.2)
BASOPHILS NFR BLD AUTO: 0.6 % — SIGNIFICANT CHANGE UP (ref 0–2)
BILIRUB SERPL-MCNC: 0.7 MG/DL — SIGNIFICANT CHANGE UP (ref 0.2–1.2)
BUN SERPL-MCNC: 9 MG/DL — SIGNIFICANT CHANGE UP (ref 7–18)
CALCIUM SERPL-MCNC: 7.9 MG/DL — LOW (ref 8.4–10.5)
CHLORIDE SERPL-SCNC: 104 MMOL/L — SIGNIFICANT CHANGE UP (ref 96–108)
CO2 SERPL-SCNC: 28 MMOL/L — SIGNIFICANT CHANGE UP (ref 22–31)
CREAT SERPL-MCNC: 0.81 MG/DL — SIGNIFICANT CHANGE UP (ref 0.5–1.3)
EOSINOPHIL # BLD AUTO: 0.01 K/UL — SIGNIFICANT CHANGE UP (ref 0–0.5)
EOSINOPHIL NFR BLD AUTO: 0.1 % — SIGNIFICANT CHANGE UP (ref 0–6)
ETHANOL SERPL-MCNC: 362 MG/DL — HIGH (ref 0–10)
GLUCOSE SERPL-MCNC: 119 MG/DL — HIGH (ref 70–99)
HCT VFR BLD CALC: 38.8 % — LOW (ref 39–50)
HGB BLD-MCNC: 13.2 G/DL — SIGNIFICANT CHANGE UP (ref 13–17)
IMM GRANULOCYTES NFR BLD AUTO: 0.1 % — SIGNIFICANT CHANGE UP (ref 0–1.5)
LIDOCAIN IGE QN: 247 U/L — SIGNIFICANT CHANGE UP (ref 73–393)
LYMPHOCYTES # BLD AUTO: 2.86 K/UL — SIGNIFICANT CHANGE UP (ref 1–3.3)
LYMPHOCYTES # BLD AUTO: 42.3 % — SIGNIFICANT CHANGE UP (ref 13–44)
MCHC RBC-ENTMCNC: 33.3 PG — SIGNIFICANT CHANGE UP (ref 27–34)
MCHC RBC-ENTMCNC: 34 GM/DL — SIGNIFICANT CHANGE UP (ref 32–36)
MCV RBC AUTO: 98 FL — SIGNIFICANT CHANGE UP (ref 80–100)
MONOCYTES # BLD AUTO: 0.5 K/UL — SIGNIFICANT CHANGE UP (ref 0–0.9)
MONOCYTES NFR BLD AUTO: 7.4 % — SIGNIFICANT CHANGE UP (ref 2–14)
NEUTROPHILS # BLD AUTO: 3.34 K/UL — SIGNIFICANT CHANGE UP (ref 1.8–7.4)
NEUTROPHILS NFR BLD AUTO: 49.5 % — SIGNIFICANT CHANGE UP (ref 43–77)
NRBC # BLD: 0 /100 WBCS — SIGNIFICANT CHANGE UP (ref 0–0)
PLATELET # BLD AUTO: 195 K/UL — SIGNIFICANT CHANGE UP (ref 150–400)
POTASSIUM SERPL-MCNC: 3 MMOL/L — LOW (ref 3.5–5.3)
POTASSIUM SERPL-SCNC: 3 MMOL/L — LOW (ref 3.5–5.3)
PROT SERPL-MCNC: 7.7 G/DL — SIGNIFICANT CHANGE UP (ref 6–8.3)
RBC # BLD: 3.96 M/UL — LOW (ref 4.2–5.8)
RBC # FLD: 12 % — SIGNIFICANT CHANGE UP (ref 10.3–14.5)
SODIUM SERPL-SCNC: 140 MMOL/L — SIGNIFICANT CHANGE UP (ref 135–145)
WBC # BLD: 6.76 K/UL — SIGNIFICANT CHANGE UP (ref 3.8–10.5)
WBC # FLD AUTO: 6.76 K/UL — SIGNIFICANT CHANGE UP (ref 3.8–10.5)

## 2021-06-11 PROCEDURE — 99284 EMERGENCY DEPT VISIT MOD MDM: CPT

## 2021-06-11 PROCEDURE — 80053 COMPREHEN METABOLIC PANEL: CPT

## 2021-06-11 PROCEDURE — 83690 ASSAY OF LIPASE: CPT

## 2021-06-11 PROCEDURE — 85025 COMPLETE CBC W/AUTO DIFF WBC: CPT

## 2021-06-11 PROCEDURE — 96375 TX/PRO/DX INJ NEW DRUG ADDON: CPT

## 2021-06-11 PROCEDURE — 80307 DRUG TEST PRSMV CHEM ANLYZR: CPT

## 2021-06-11 PROCEDURE — 82962 GLUCOSE BLOOD TEST: CPT

## 2021-06-11 PROCEDURE — 96374 THER/PROPH/DIAG INJ IV PUSH: CPT

## 2021-06-11 PROCEDURE — 96376 TX/PRO/DX INJ SAME DRUG ADON: CPT

## 2021-06-11 PROCEDURE — 99285 EMERGENCY DEPT VISIT HI MDM: CPT | Mod: 25

## 2021-06-11 PROCEDURE — 36415 COLL VENOUS BLD VENIPUNCTURE: CPT

## 2021-06-11 RX ORDER — FAMOTIDINE 10 MG/ML
20 INJECTION INTRAVENOUS ONCE
Refills: 0 | Status: COMPLETED | OUTPATIENT
Start: 2021-06-11 | End: 2021-06-11

## 2021-06-11 RX ORDER — ONDANSETRON 8 MG/1
4 TABLET, FILM COATED ORAL ONCE
Refills: 0 | Status: COMPLETED | OUTPATIENT
Start: 2021-06-11 | End: 2021-06-11

## 2021-06-11 RX ORDER — SODIUM CHLORIDE 9 MG/ML
2000 INJECTION INTRAMUSCULAR; INTRAVENOUS; SUBCUTANEOUS ONCE
Refills: 0 | Status: COMPLETED | OUTPATIENT
Start: 2021-06-11 | End: 2021-06-11

## 2021-06-11 RX ADMIN — Medication 75 MILLIGRAM(S): at 06:43

## 2021-06-11 RX ADMIN — ONDANSETRON 4 MILLIGRAM(S): 8 TABLET, FILM COATED ORAL at 03:18

## 2021-06-11 RX ADMIN — Medication 2 MILLIGRAM(S): at 06:43

## 2021-06-11 RX ADMIN — FAMOTIDINE 20 MILLIGRAM(S): 10 INJECTION INTRAVENOUS at 03:18

## 2021-06-11 RX ADMIN — SODIUM CHLORIDE 2000 MILLILITER(S): 9 INJECTION INTRAMUSCULAR; INTRAVENOUS; SUBCUTANEOUS at 03:20

## 2021-06-11 RX ADMIN — ONDANSETRON 4 MILLIGRAM(S): 8 TABLET, FILM COATED ORAL at 05:51

## 2021-06-11 NOTE — ED PROVIDER NOTE - NSFOLLOWUPCLINICS_GEN_ALL_ED_FT
[facility]  Detox  [address] [place] [address]  Phone: [phone]  Fax: [phone]    [facility]  Detox  [address] [place] [address]  Phone: [phone]  Fax: [phone]    [facility]  Detox Mgmt  [address] [place] [address]  Phone: [phone]  Fax:

## 2021-06-11 NOTE — ED PROVIDER NOTE - PATIENT PORTAL LINK FT
You can access the FollowMyHealth Patient Portal offered by [facility] by registering at the following website: [email or web address]. By joining [employer]’s FollowMyHealth portal, you will also be able to view your health information using other applications (apps) compatible with our system.

## 2021-06-11 NOTE — ED PROVIDER NOTE - PROGRESS NOTE DETAILS
labs unremarkable except elevated etoh level. after meds some improvement. at a sandwich but now with mild nausea. additional zofran ordered. pt states feels like he is going to withdraw although etoh still very elevated. will give dose of ativan and librium and dc with detox referrals.

## 2021-06-11 NOTE — ED ADULT NURSE NOTE - PRO INTERPRETER NEED 2
Referring provider, please enter orders for Service to Diabetic Education and Service to Nutrition Counseling.
English

## 2021-06-11 NOTE — ED PROVIDER NOTE - CLINICAL SUMMARY MEDICAL DECISION MAKING FREE TEXT BOX
25 year old male with abd pain and N/V after etoh. vitals WNL. PE as above.  labs, zofran, fluid bolus, reassess

## 2021-06-11 NOTE — ED PROVIDER NOTE - OBJECTIVE STATEMENT
25 year old male PMH HTN coming in with epigastric abd pain, nausea, and multiple episodes of nbnb vomiting after bring a large amount of alcohol tonight. also states with hiccups. denies all other complaints. 25 year old male PMH HTN and etoh abuse coming in with epigastric abd pain, nausea, and multiple episodes of nbnb vomiting after bring a large amount of alcohol tonight. also states with hiccups. denies all other complaints.

## 2021-06-11 NOTE — ED PROVIDER NOTE - NSFOLLOWUPINSTRUCTIONS_ED_ALL_ED_FT
Log Out.      [employer]®     :  [facility]  	                       ABUSE OF ALCOHOL - General Information           Abuse of Alcohol    WHAT YOU NEED TO KNOW:    What is alcohol abuse? Alcohol abuse means you drink more than the recommended daily or weekly limits. You may be drinking alcohol regularly or drinking large amounts in a short period of time (binge drinking). You continue to drink even though it causes legal, work, or relationship problems.    What do I need to know about recommended alcohol limits?   •Men 21 to 64 years should limit alcohol to 2 drinks a day. Do not have more than 4 drinks in 1 day or more than 14 in 1 week.      •All women, and men 65 or older should limit alcohol to 1 drink in a day. Do not have more than 3 drinks in 1 day or more than 7 in 1 week. No amount of alcohol is okay during pregnancy.      What are the signs and symptoms of alcohol abuse?   •Loss of interest in activities, work, and school      •Hiding alcohol, or drinking in private      •Depression, or guilt about drinking      •Constant thoughts about alcohol      •Drinking in the morning to relieve the effects of a hangover      •Not being able to control the amount you drink      •Restlessness, or erratic and violent behavior      What health problems can alcohol abuse cause?   •Cancer in your liver, pancreas, stomach, colon, kidney, or breast      •Stroke or a heart attack      •Liver, kidney, or lung disease      •Blackouts, memory loss, brain damage, or dementia      •Diabetes, immune system problems, or thiamine (vitamin B1) deficiency      •Problems for you and your baby if you drink while pregnant      How is alcohol abuse treated? Treatment helps you understand the reasons you abuse alcohol. Counselors and therapists provide you with support and help you find ways to cope instead of drinking. You may need inpatient treatment to provide a controlled environment. You may need outpatient treatment after your inpatient treatment is complete.  •Detoxification (detox) is a program used to flush alcohol from your body. During detox, medicines are given to help prevent withdrawal symptoms when you stop drinking alcohol.      •In brief intervention therapy, a healthcare provider helps you think about your alcohol use differently. He or she helps you set goals to decrease the amount of alcohol you drink. Therapy may continue after you leave the hospital.      •Vitamin supplements such as B1 may be needed. Alcohol can make it hard for your body to absorb enough vitamin B1. You may be given vitamin B1 if you have low levels. It is also given to prevent brain damage from alcohol use.      What can I do to manage my alcohol use?   •Decrease the amount you drink. This can help prevent health problems such as brain, heart, and liver damage, high blood pressure, diabetes, and cancer. If you cannot stop completely, healthcare providers can help you set goals to decrease the amount you drink.      •Plan weekly alcohol use. You will be less likely to drink more than the recommended limit if you plan ahead.      •Have food when you drink alcohol. Food will prevent alcohol from getting into your system too quickly. Eat before you have your first alcohol drink.      •Time your drinks carefully. Have no more than 1 drink in an hour. Have a liquid such as water, coffee, or a soft drink between alcohol drinks.      •Do not drive if you have had alcohol. Make sure someone who has not been drinking can help you get home.      •Do not drink alcohol if you are taking medicine. Alcohol is dangerous when you combine it with certain medicines, such as acetaminophen or blood pressure medicine. Talk to your healthcare provider about all the medicines you currently take.      Where can I find support and more information?   •Alcoholics Anonymous  Web Address: http://www.aa.org      •Substance Abuse and Mental Health Services Administration  [address]  [place] [address]  Web Address: http://www.[facility].gov        Call your local emergency number (911 in the [place]) for any of the following:   •You have sudden chest pain or trouble breathing.      •You want to harm yourself or others.      •You have a seizure or have shaking or trembling.      When should I call my doctor?   •You have hallucinations (you see or hear things that are not real).      •You cannot stop vomiting or you vomit blood.      •You need help to stop drinking alcohol.       •You have questions or concerns about your condition or care.      CARE AGREEMENT:    You have the right to help plan your care. Learn about your health condition and how it may be treated. Discuss treatment options with your healthcare providers to decide what care you want to receive. You always have the right to refuse treatment.        © Copyright [employer] 2021           back to top                          © Copyright [employer] 2021

## 2022-06-01 ENCOUNTER — INPATIENT (INPATIENT)
Facility: HOSPITAL | Age: 27
LOS: 1 days | Discharge: LEFT BEFORE DISCHARGE | End: 2022-06-03
Attending: INTERNAL MEDICINE | Admitting: INTERNAL MEDICINE
Payer: MEDICAID

## 2022-06-01 VITALS
SYSTOLIC BLOOD PRESSURE: 112 MMHG | TEMPERATURE: 98 F | HEIGHT: 69 IN | RESPIRATION RATE: 18 BRPM | HEART RATE: 96 BPM | WEIGHT: 186.95 LBS | OXYGEN SATURATION: 97 % | DIASTOLIC BLOOD PRESSURE: 774 MMHG

## 2022-06-01 LAB
ALBUMIN SERPL ELPH-MCNC: 4.3 G/DL — SIGNIFICANT CHANGE UP (ref 3.3–5)
ALP SERPL-CCNC: 85 U/L — SIGNIFICANT CHANGE UP (ref 40–120)
ALT FLD-CCNC: 114 U/L — HIGH (ref 12–78)
AMPHET UR-MCNC: NEGATIVE — SIGNIFICANT CHANGE UP
ANION GAP SERPL CALC-SCNC: 23 MMOL/L — HIGH (ref 5–17)
APAP SERPL-MCNC: < 2 UG/ML (ref 10–30)
AST SERPL-CCNC: 163 U/L — HIGH (ref 15–37)
BARBITURATES UR SCN-MCNC: NEGATIVE — SIGNIFICANT CHANGE UP
BASOPHILS # BLD AUTO: 0.02 K/UL — SIGNIFICANT CHANGE UP (ref 0–0.2)
BASOPHILS NFR BLD AUTO: 0.6 % — SIGNIFICANT CHANGE UP (ref 0–2)
BENZODIAZ UR-MCNC: POSITIVE — SIGNIFICANT CHANGE UP
BILIRUB SERPL-MCNC: 0.8 MG/DL — SIGNIFICANT CHANGE UP (ref 0.2–1.2)
BUN SERPL-MCNC: 12 MG/DL — SIGNIFICANT CHANGE UP (ref 7–23)
CALCIUM SERPL-MCNC: 8.9 MG/DL — SIGNIFICANT CHANGE UP (ref 8.5–10.1)
CHLORIDE SERPL-SCNC: 97 MMOL/L — SIGNIFICANT CHANGE UP (ref 96–108)
CO2 SERPL-SCNC: 18 MMOL/L — LOW (ref 22–31)
COCAINE METAB.OTHER UR-MCNC: NEGATIVE — SIGNIFICANT CHANGE UP
CREAT SERPL-MCNC: 1.02 MG/DL — SIGNIFICANT CHANGE UP (ref 0.5–1.3)
EGFR: 103 ML/MIN/1.73M2 — SIGNIFICANT CHANGE UP
EOSINOPHIL # BLD AUTO: 0 K/UL — SIGNIFICANT CHANGE UP (ref 0–0.5)
EOSINOPHIL NFR BLD AUTO: 0 % — SIGNIFICANT CHANGE UP (ref 0–6)
ETHANOL SERPL-MCNC: 305 MG/DL — HIGH (ref 0–10)
FLUAV AG NPH QL: SIGNIFICANT CHANGE UP
FLUBV AG NPH QL: SIGNIFICANT CHANGE UP
GLUCOSE SERPL-MCNC: 59 MG/DL — LOW (ref 70–99)
HCT VFR BLD CALC: 44.5 % — SIGNIFICANT CHANGE UP (ref 39–50)
HGB BLD-MCNC: 15 G/DL — SIGNIFICANT CHANGE UP (ref 13–17)
IMM GRANULOCYTES NFR BLD AUTO: 0.3 % — SIGNIFICANT CHANGE UP (ref 0–1.5)
LIDOCAIN IGE QN: 271 U/L — SIGNIFICANT CHANGE UP (ref 73–393)
LYMPHOCYTES # BLD AUTO: 0.99 K/UL — LOW (ref 1–3.3)
LYMPHOCYTES # BLD AUTO: 27.9 % — SIGNIFICANT CHANGE UP (ref 13–44)
MAGNESIUM SERPL-MCNC: 2.3 MG/DL — SIGNIFICANT CHANGE UP (ref 1.6–2.6)
MCHC RBC-ENTMCNC: 33.7 G/DL — SIGNIFICANT CHANGE UP (ref 32–36)
MCHC RBC-ENTMCNC: 34.9 PG — HIGH (ref 27–34)
MCV RBC AUTO: 103.5 FL — HIGH (ref 80–100)
METHADONE UR-MCNC: NEGATIVE — SIGNIFICANT CHANGE UP
MONOCYTES # BLD AUTO: 0.28 K/UL — SIGNIFICANT CHANGE UP (ref 0–0.9)
MONOCYTES NFR BLD AUTO: 7.9 % — SIGNIFICANT CHANGE UP (ref 2–14)
NEUTROPHILS # BLD AUTO: 2.25 K/UL — SIGNIFICANT CHANGE UP (ref 1.8–7.4)
NEUTROPHILS NFR BLD AUTO: 63.3 % — SIGNIFICANT CHANGE UP (ref 43–77)
NRBC # BLD: 0 /100 WBCS — SIGNIFICANT CHANGE UP (ref 0–0)
OPIATES UR-MCNC: NEGATIVE — SIGNIFICANT CHANGE UP
PCP SPEC-MCNC: SIGNIFICANT CHANGE UP
PCP UR-MCNC: NEGATIVE — SIGNIFICANT CHANGE UP
PLATELET # BLD AUTO: 139 K/UL — LOW (ref 150–400)
POTASSIUM SERPL-MCNC: 3.8 MMOL/L — SIGNIFICANT CHANGE UP (ref 3.5–5.3)
POTASSIUM SERPL-SCNC: 3.8 MMOL/L — SIGNIFICANT CHANGE UP (ref 3.5–5.3)
PROT SERPL-MCNC: 8.7 GM/DL — HIGH (ref 6–8.3)
RBC # BLD: 4.3 M/UL — SIGNIFICANT CHANGE UP (ref 4.2–5.8)
RBC # FLD: 13.8 % — SIGNIFICANT CHANGE UP (ref 10.3–14.5)
SALICYLATES SERPL-MCNC: <1.7 MG/DL — LOW (ref 2.8–20)
SARS-COV-2 RNA SPEC QL NAA+PROBE: SIGNIFICANT CHANGE UP
SODIUM SERPL-SCNC: 138 MMOL/L — SIGNIFICANT CHANGE UP (ref 135–145)
THC UR QL: POSITIVE — SIGNIFICANT CHANGE UP
WBC # BLD: 3.55 K/UL — LOW (ref 3.8–10.5)
WBC # FLD AUTO: 3.55 K/UL — LOW (ref 3.8–10.5)

## 2022-06-01 PROCEDURE — 93010 ELECTROCARDIOGRAM REPORT: CPT

## 2022-06-01 PROCEDURE — 99223 1ST HOSP IP/OBS HIGH 75: CPT

## 2022-06-01 PROCEDURE — 71045 X-RAY EXAM CHEST 1 VIEW: CPT | Mod: 26

## 2022-06-01 PROCEDURE — 74176 CT ABD & PELVIS W/O CONTRAST: CPT | Mod: 26,MA

## 2022-06-01 PROCEDURE — 99285 EMERGENCY DEPT VISIT HI MDM: CPT

## 2022-06-01 RX ORDER — LANOLIN ALCOHOL/MO/W.PET/CERES
3 CREAM (GRAM) TOPICAL AT BEDTIME
Refills: 0 | Status: DISCONTINUED | OUTPATIENT
Start: 2022-06-01 | End: 2022-06-02

## 2022-06-01 RX ORDER — FOLIC ACID 0.8 MG
1 TABLET ORAL DAILY
Refills: 0 | Status: DISCONTINUED | OUTPATIENT
Start: 2022-06-01 | End: 2022-06-03

## 2022-06-01 RX ORDER — SODIUM CHLORIDE 9 MG/ML
1000 INJECTION INTRAMUSCULAR; INTRAVENOUS; SUBCUTANEOUS ONCE
Refills: 0 | Status: COMPLETED | OUTPATIENT
Start: 2022-06-01 | End: 2022-06-01

## 2022-06-01 RX ORDER — FOLIC ACID 0.8 MG
1 TABLET ORAL ONCE
Refills: 0 | Status: COMPLETED | OUTPATIENT
Start: 2022-06-01 | End: 2022-06-01

## 2022-06-01 RX ORDER — PANTOPRAZOLE SODIUM 20 MG/1
40 TABLET, DELAYED RELEASE ORAL ONCE
Refills: 0 | Status: COMPLETED | OUTPATIENT
Start: 2022-06-01 | End: 2022-06-01

## 2022-06-01 RX ORDER — PANTOPRAZOLE SODIUM 20 MG/1
40 TABLET, DELAYED RELEASE ORAL
Refills: 0 | Status: DISCONTINUED | OUTPATIENT
Start: 2022-06-01 | End: 2022-06-03

## 2022-06-01 RX ORDER — THIAMINE MONONITRATE (VIT B1) 100 MG
100 TABLET ORAL DAILY
Refills: 0 | Status: DISCONTINUED | OUTPATIENT
Start: 2022-06-01 | End: 2022-06-03

## 2022-06-01 RX ORDER — SODIUM CHLORIDE 9 MG/ML
1000 INJECTION INTRAMUSCULAR; INTRAVENOUS; SUBCUTANEOUS
Refills: 0 | Status: DISCONTINUED | OUTPATIENT
Start: 2022-06-01 | End: 2022-06-03

## 2022-06-01 RX ORDER — ACETAMINOPHEN 500 MG
650 TABLET ORAL EVERY 6 HOURS
Refills: 0 | Status: DISCONTINUED | OUTPATIENT
Start: 2022-06-01 | End: 2022-06-03

## 2022-06-01 RX ORDER — THIAMINE MONONITRATE (VIT B1) 100 MG
100 TABLET ORAL ONCE
Refills: 0 | Status: COMPLETED | OUTPATIENT
Start: 2022-06-01 | End: 2022-06-01

## 2022-06-01 RX ORDER — ONDANSETRON 8 MG/1
4 TABLET, FILM COATED ORAL ONCE
Refills: 0 | Status: COMPLETED | OUTPATIENT
Start: 2022-06-01 | End: 2022-06-01

## 2022-06-01 RX ORDER — ONDANSETRON 8 MG/1
4 TABLET, FILM COATED ORAL EVERY 8 HOURS
Refills: 0 | Status: DISCONTINUED | OUTPATIENT
Start: 2022-06-01 | End: 2022-06-03

## 2022-06-01 RX ADMIN — Medication 100 MILLIGRAM(S): at 17:14

## 2022-06-01 RX ADMIN — Medication 25 MILLIGRAM(S): at 23:01

## 2022-06-01 RX ADMIN — SODIUM CHLORIDE 75 MILLILITER(S): 9 INJECTION INTRAMUSCULAR; INTRAVENOUS; SUBCUTANEOUS at 22:47

## 2022-06-01 RX ADMIN — Medication 2 MILLIGRAM(S): at 21:28

## 2022-06-01 RX ADMIN — ONDANSETRON 4 MILLIGRAM(S): 8 TABLET, FILM COATED ORAL at 21:28

## 2022-06-01 RX ADMIN — PANTOPRAZOLE SODIUM 40 MILLIGRAM(S): 20 TABLET, DELAYED RELEASE ORAL at 22:50

## 2022-06-01 RX ADMIN — ONDANSETRON 4 MILLIGRAM(S): 8 TABLET, FILM COATED ORAL at 16:57

## 2022-06-01 RX ADMIN — Medication 2 MILLIGRAM(S): at 18:00

## 2022-06-01 RX ADMIN — PANTOPRAZOLE SODIUM 40 MILLIGRAM(S): 20 TABLET, DELAYED RELEASE ORAL at 16:57

## 2022-06-01 RX ADMIN — SODIUM CHLORIDE 1000 MILLILITER(S): 9 INJECTION INTRAMUSCULAR; INTRAVENOUS; SUBCUTANEOUS at 16:50

## 2022-06-01 RX ADMIN — Medication 1 MILLIGRAM(S): at 17:13

## 2022-06-01 NOTE — H&P ADULT - NSHPLABSRESULTS_GEN_ALL_CORE
LABS:                        15.0   3.55  )-----------( 139      ( 01 Jun 2022 17:12 )             44.5             06-01    138  |  97  |  12  ----------------------------<  59<L>  3.8   |  18<L>  |  1.02    Ca    8.9      01 Jun 2022 18:24  Mg     2.3     06-01    TPro  8.7<H>  /  Alb  4.3  /  TBili  0.8  /  DBili  x   /  AST  163<H>  /  ALT  114<H>  /  AlkPhos  85  06-01                  Interval Radiology studies: reviewed by me    < from: CT Abdomen and Pelvis No Cont (06.01.22 @ 17:35) >      1. Distal esophageal wall thickening, compatible withesophagitis.   Recommend follow-up endoscopy for further assessment.  2. Hepatic steatosis.  3. No bowel obstruction.    < end of copied text >

## 2022-06-01 NOTE — ED ADULT NURSE NOTE - CHIEF COMPLAINT QUOTE
pt states, pt drinks 1L of melania daily, pt stopped drinking x 5 days ago, pt started having decreased appetite and vomiting after stop drinking. pt vomiting blood started today. moved to shelter x 3 weeks ago. pt talking continuously in triage. vomiting in triage. hx: pancreatitis.

## 2022-06-01 NOTE — H&P ADULT - NSHPPHYSICALEXAM_GEN_ALL_CORE
T(C): 36.7 (06-01-22 @ 19:03), Max: 36.9 (06-01-22 @ 14:26)  HR: 100 (06-01-22 @ 19:03) (93 - 100)  BP: 138/77 (06-01-22 @ 19:03) (112/74 - 144/83)  RR: 20 (06-01-22 @ 19:03) (18 - 20)  SpO2: 97% (06-01-22 @ 19:03) (95% - 97%)    General: NAD, moderately built  HEENT: Head is atraumatic and normocephalic. Pupils are equal and reactive to light. no Conjunctival congestion. No abnormal lesions or bleeding from nares. Oral mucosa moist. No Pharyngeal congestion. EAC intact and no drainage noticed from ears. + alcoholic breath.   Neck: supple. no JVD. No visible Thyroid enlargement   CVS: S1 S2 normal, RRR, tachycardic  Resp: No labored breathing. CTA bilaterally, no wheezing, no crackles   GI: abd soft, non tender. + BS  MSK: Expected ROM in all extremities. No cyanosis. No Clubbing. No edema  Neurology: Grossly non focal.   Lymphatic: No gross LAP   Integumentary: moist, no rash   Psychiatry: Alert, awake. Oriented x 3. Appropriate mood

## 2022-06-01 NOTE — ED ADULT NURSE REASSESSMENT NOTE - NS ED NURSE REASSESS COMMENT FT1
Pt placed on 1:1 observation, safety and fall precautions maintained, continue to be monitored awaiting further evaluation

## 2022-06-01 NOTE — ED ADULT NURSE NOTE - OBJECTIVE STATEMENT
Pt c/o abdominal pain with nausea and vomiting blood with hx of pancreatitis and liver cirrhosis stated by patient. Pt  states he last drank alcohol 3 days, c/o feeling depressed because his girlfriend has been cheating on him. Pt states last month he put a gun to his mouth because he wanted to die.

## 2022-06-01 NOTE — ED ADULT NURSE NOTE - ED STAT RN HANDOFF DETAILS
Patient received from JULIANNA espana. 1:1 maintained. Patient is sleeping in bed, respirations unlabored, remains on cardiac monitor. Patient refuses to wake up to answer this writer's questions. Safety and environmental checks maintained. Pending bed placement for admission. No acute distress noted.

## 2022-06-01 NOTE — ED PROVIDER NOTE - CLINICAL SUMMARY MEDICAL DECISION MAKING FREE TEXT BOX
hx, ekg, Pt's labs, ct of abd/pelvis and psych and care are signed out to the next team. hx, ekg, Pt's labs, ct of abd/pelvis in pt psych consult

## 2022-06-01 NOTE — ED PROVIDER NOTE - EMPLOYMENT
Is The Patient Presenting As Previously Scheduled?: Yes
How Severe Is Your Rash?: mild
Is This A New Presentation, Or A Follow-Up?: Rash
Additional History: Patient is here to discuss a rash on the back of his hands and neck. Patient states it is very itchy, dry and sometimes bumpy. Patient states he washes his hands a lot and thinks it may be contributing to the rash. Patient denies history of eczema. Patient was given triamcinalone and he states it helps but he is unable to stop using it without flaring. Patient denies recent illness or medication changes.
N/A

## 2022-06-01 NOTE — H&P ADULT - HISTORY OF PRESENT ILLNESS
26 years old male with PMH Of chronic alcohol dependence and ongoing alcohol abuse, alcohol withdrawal seizures, black outs, presented c/o epigastric abd pain associated n/v x 3 days. Pt admit alcohol drinking one liter per day. His last drink was three days ago and he drank a pint on that day. He felt depressed and had some suicidal ideations but no plan. he quit alcohol since then. He vomited bright red blood one episode no clots 3 days. No report of any fall or seizures.    Pt denies headache, dizziness, blurred visions, light sensitivities, focal/distal weakness or numbness, cough, sob, chest pain, fever, chills, dysuria or irregular bowel movements.  In the ER patient was found to be in active acute alcohol withdrawal and hospitalist service was consulted for further management.   patient reported no fever at home. no hematochezia or melena.

## 2022-06-01 NOTE — ED PROVIDER NOTE - CONSTITUTIONAL, MLM
normal... Well appearing, awake, alert, oriented to person, place, time/situation and in no apparent distress.+ strong alcohol on breath. Speaking in clear full sentences no nasal flaring no shoulders retractions no diaphoresis

## 2022-06-01 NOTE — ED ADULT NURSE NOTE - NSIMPLEMENTINTERV_GEN_ALL_ED
Implemented All Universal Safety Interventions:  [place] to call system. Call bell, personal items and telephone within reach. Instruct patient to call for assistance. Room bathroom lighting operational. Non-slip footwear when patient is off stretcher. Physically safe environment: no spills, clutter or unnecessary equipment. Stretcher in lowest position, wheels locked, appropriate side rails in place.

## 2022-06-01 NOTE — ED ADULT TRIAGE NOTE - CHIEF COMPLAINT QUOTE
pt states, pt drinks 1L of melania daily, pt stopped drinking x 5 days ago, pt started having decreased appetite and vomiting after stop drinking. pt vomiting blood started today. moved to shelter x 3 weeks ago. pt talking continuously in triage. vomiting in triage. pt states, pt drinks 1L of melania daily, pt stopped drinking x 5 days ago, pt started having decreased appetite and vomiting after stop drinking. pt vomiting blood started today. moved to shelter x 3 weeks ago. pt talking continuously in triage. vomiting in triage. hx: pancreatitis.

## 2022-06-01 NOTE — ED PROVIDER NOTE - OBJECTIVE STATEMENT
26 years old male here c/o abd pain n/v x 3 days Pt admit alcohol drinking one liter per day and but last drink was three days ago. Pt also c/o vomited bright red blood one episode no clots. Pt sts he has a hx of pancreatitis. Pt denies head 26 years old male here c/o abd pain n/v x 3 days Pt admit alcohol drinking one liter per day and but last drink was three days ago. Pt also c/o vomited bright red blood one episode no clots. Pt sts he has a hx of pancreatitis. Pt denies headache, dizziness, blurred visions, light sensitivities, focal/distal weakness or numbness, cough, sob, chest pain, fever, chills, dysuria or irregular bowel movements.

## 2022-06-01 NOTE — ED PROVIDER NOTE - ENMT, MLM
Airway patent, Nasal mucosa clear. Mouth with normal mucosa. Throat has no vesicles, no oropharyngeal exudates and uvula is midline. no hematoma no wound to scalp or face

## 2022-06-01 NOTE — H&P ADULT - NSHPSOCIALHISTORY_GEN_ALL_CORE
Father diet of alcohol abuse  no report any surgeries in the past  denied tobacco abuse or illicit drug abuse  admits to drinking 1 liter a day alcohol.

## 2022-06-01 NOTE — H&P ADULT - NSHPREVIEWOFSYSTEMS_GEN_ALL_CORE
Except pertinent positives and negatives as mentioned in HPI, all other review of systems including constitutional, HEENT, CVS, Resp, GI, , MSK, Integumentary, Psychiatry, Neurology, allergic, hematologic/lymphatic are reviewed and found unremarkable at the time of my evaluation.  (patient not very good historian)

## 2022-06-01 NOTE — ED PROVIDER NOTE - PROGRESS NOTE DETAILS
Pt now sts he have being depressed and he put a gun into his mouth last month, Pt 's CIWA score is 8. Pt now sts he have being depressed and he put a gun into his mouth last month, Pt 's CIWA score is 8. Pt sts he was admitted to psych in [facility] 2 or 3 months ago was dx with bipolar but pt is not taking any meds. Dr. Arriola is notified and admit pt.

## 2022-06-01 NOTE — H&P ADULT - ASSESSMENT
Acute alcohol withdrawal in a patient with chronic alcohol dependence and ongoing alcohol abuse/acute intoxication. patient is at risk for severe withdrawal based PAWSS score (hx of seizure, black outs, withdrawals). admit to medicine. start scheduled librium and prn symptom triggered iv ativan. start IVF and MVI, folate. seizure and aspiration precautions.   Hematemesis. esophagitis on CT abd. H and H stable. no more active bleeding. Tredn CBC. start iv PPI.   Alcoholic hepatitis. chronic Trend.  Suicidal ideations. cont 1:1. suicidal precautions. Psych consult recs.    DVT ppx: SCDs.   Full code

## 2022-06-02 DIAGNOSIS — F12.90 CANNABIS USE, UNSPECIFIED, UNCOMPLICATED: ICD-10-CM

## 2022-06-02 LAB
ALBUMIN SERPL ELPH-MCNC: 3.8 G/DL — SIGNIFICANT CHANGE UP (ref 3.3–5)
ALP SERPL-CCNC: 72 U/L — SIGNIFICANT CHANGE UP (ref 40–120)
ALT FLD-CCNC: 89 U/L — HIGH (ref 12–78)
ANION GAP SERPL CALC-SCNC: 13 MMOL/L — SIGNIFICANT CHANGE UP (ref 5–17)
AST SERPL-CCNC: 115 U/L — HIGH (ref 15–37)
BILIRUB SERPL-MCNC: 1.1 MG/DL — SIGNIFICANT CHANGE UP (ref 0.2–1.2)
BUN SERPL-MCNC: 10 MG/DL — SIGNIFICANT CHANGE UP (ref 7–23)
CALCIUM SERPL-MCNC: 9.1 MG/DL — SIGNIFICANT CHANGE UP (ref 8.5–10.1)
CHLORIDE SERPL-SCNC: 100 MMOL/L — SIGNIFICANT CHANGE UP (ref 96–108)
CO2 SERPL-SCNC: 24 MMOL/L — SIGNIFICANT CHANGE UP (ref 22–31)
CREAT SERPL-MCNC: 0.98 MG/DL — SIGNIFICANT CHANGE UP (ref 0.5–1.3)
EGFR: 108 ML/MIN/1.73M2 — SIGNIFICANT CHANGE UP
GLUCOSE SERPL-MCNC: 81 MG/DL — SIGNIFICANT CHANGE UP (ref 70–99)
HCT VFR BLD CALC: 37.4 % — LOW (ref 39–50)
HGB BLD-MCNC: 12.7 G/DL — LOW (ref 13–17)
MAGNESIUM SERPL-MCNC: 2.3 MG/DL — SIGNIFICANT CHANGE UP (ref 1.6–2.6)
MCHC RBC-ENTMCNC: 34 G/DL — SIGNIFICANT CHANGE UP (ref 32–36)
MCHC RBC-ENTMCNC: 34.4 PG — HIGH (ref 27–34)
MCV RBC AUTO: 101.4 FL — HIGH (ref 80–100)
NRBC # BLD: 0 /100 WBCS — SIGNIFICANT CHANGE UP (ref 0–0)
PHOSPHATE SERPL-MCNC: 2.8 MG/DL — SIGNIFICANT CHANGE UP (ref 2.5–4.5)
PLATELET # BLD AUTO: 135 K/UL — LOW (ref 150–400)
POTASSIUM SERPL-MCNC: 3.7 MMOL/L — SIGNIFICANT CHANGE UP (ref 3.5–5.3)
POTASSIUM SERPL-SCNC: 3.7 MMOL/L — SIGNIFICANT CHANGE UP (ref 3.5–5.3)
PROT SERPL-MCNC: 7.8 GM/DL — SIGNIFICANT CHANGE UP (ref 6–8.3)
RBC # BLD: 3.69 M/UL — LOW (ref 4.2–5.8)
RBC # FLD: 13.3 % — SIGNIFICANT CHANGE UP (ref 10.3–14.5)
SODIUM SERPL-SCNC: 137 MMOL/L — SIGNIFICANT CHANGE UP (ref 135–145)
WBC # BLD: 3.42 K/UL — LOW (ref 3.8–10.5)
WBC # FLD AUTO: 3.42 K/UL — LOW (ref 3.8–10.5)

## 2022-06-02 PROCEDURE — 99233 SBSQ HOSP IP/OBS HIGH 50: CPT

## 2022-06-02 PROCEDURE — 90792 PSYCH DIAG EVAL W/MED SRVCS: CPT

## 2022-06-02 RX ADMIN — Medication 2 MILLIGRAM(S): at 10:34

## 2022-06-02 RX ADMIN — ONDANSETRON 4 MILLIGRAM(S): 8 TABLET, FILM COATED ORAL at 08:37

## 2022-06-02 RX ADMIN — Medication 100 MILLIGRAM(S): at 12:52

## 2022-06-02 RX ADMIN — Medication 25 MILLIGRAM(S): at 12:52

## 2022-06-02 RX ADMIN — Medication 30 MILLILITER(S): at 17:58

## 2022-06-02 RX ADMIN — PANTOPRAZOLE SODIUM 40 MILLIGRAM(S): 20 TABLET, DELAYED RELEASE ORAL at 17:58

## 2022-06-02 RX ADMIN — Medication 30 MILLILITER(S): at 12:51

## 2022-06-02 RX ADMIN — Medication 1 MILLIGRAM(S): at 12:50

## 2022-06-02 RX ADMIN — Medication 1 TABLET(S): at 12:51

## 2022-06-02 RX ADMIN — ONDANSETRON 4 MILLIGRAM(S): 8 TABLET, FILM COATED ORAL at 17:59

## 2022-06-02 RX ADMIN — Medication 2 MILLIGRAM(S): at 03:13

## 2022-06-02 RX ADMIN — Medication 25 MILLIGRAM(S): at 17:59

## 2022-06-02 RX ADMIN — SODIUM CHLORIDE 75 MILLILITER(S): 9 INJECTION INTRAMUSCULAR; INTRAVENOUS; SUBCUTANEOUS at 08:38

## 2022-06-02 RX ADMIN — Medication 25 MILLIGRAM(S): at 05:33

## 2022-06-02 RX ADMIN — PANTOPRAZOLE SODIUM 40 MILLIGRAM(S): 20 TABLET, DELAYED RELEASE ORAL at 05:33

## 2022-06-02 RX ADMIN — Medication 30 MILLILITER(S): at 21:42

## 2022-06-02 NOTE — BH CONSULTATION LIAISON ASSESSMENT NOTE - SUICIDE ATTEMPT:
<p>Prior to commencing surgery, patient identification, surgical procedure, site, and side were confirmed by Dr. Cony Vásquez. Following topical proparacaine anesthesia, the patient was positioned at the YAG laser, a contact lens coupled to the cornea with methylcellulose and a peripheral iridotomy placed using 10.5 Mj x 57. without complication. Excess methylcellulose was washed from the eyes, one drop of Econopred Plus 1% instilled and the patient returned to the holding area having tolerated the procedure well and without complication. </p><p>MRN:[id number]</p>
None known

## 2022-06-02 NOTE — BH CONSULTATION LIAISON ASSESSMENT NOTE - HPI (INCLUDE ILLNESS QUALITY, SEVERITY, DURATION, TIMING, CONTEXT, MODIFYING FACTORS, ASSOCIATED SIGNS AND SYMPTOMS)
25 yo male with hx of Alcohol Abuse (drinking 1 L of Swathi /daily), hx of pancreatitis, BIB EMS from home c/o abd pain, nausea, vomiting x 3 days.  Pt admit alcohol drinking one liter per day and but last drink was three days ago. Pt also c/o vomited bright red blood one episode no clots. ; UTOX + for Benzo, and THC.  25 yo BALDOMERO, works in a [employer] shop, has 2 kids ages 5 and 6 (they live with their mother), domiciled in [place] with a girlfriend and her 6 children, with hx of Alcohol Abuse (drinking 1 L of Swathi /daily), denies hx of DTs/severe withdrawals like seizure, went to a [place] rehab 2 weeks ago and left after 4 days due to dissatisfaction with the care, hx of pancreatitis, BIB EMS from home c/o abd pain, nausea, vomiting x 3 days.  Pt admit alcohol drinking one liter per day and but last drink was three days ago. Pt also c/o vomited bright red blood one episode no clots. ; UTOX + for Benzo, and THC. ED MD addendum:"Pt now sts he have being depressed and he put a gun into his mouth last month, Pt 's CIWA score is 8. Pt sts he was admitted to psych in [facility] 2 or 3 months ago was dx with bipolar but pt is not taking any meds." Psych consult thus called.     PSYCKES: Substance use disorder diagnosis (alcohol, tobacco; psychoactive substance); ADHD. PMH of pancreatitis, gatritis; attends [facility] outpt medical clinic last seen 4/19/22; outpt substance abuse services at [facility] (3/14/22); [facility] (1/30/22); any ED visits for intox/GI complaints; several intox stints; [facility] inpt psych 1/21/22-1/25/22 for mood; numerous CPEP stays for intox/substance related issues     EXAM: calm, cooperative, reports that he has been drinking a lot daily due to stressors and also has low appetite when he drinks so developed chronic gastritis. Patient states that he requested some food before and feels ready to try "real food" at this time. Patient denies that he vomited any blood etc prior to admission. He cannot recall how he ended up here as he lives in [facility]. Patient does not exhibit acute alcohol withdrawal symptoms at this time, vitals stable, subjective reports of mild residual abdominal discomfort, tremulousness. Cannot exactly recall making suicidal themed statement in the ED; denies that he had an actual attempt before such as what he verbalized. Admits that life is tough for him given issues seeing his 5 and 6 year olds / fighting with their mother; working, having 6 kids in the house. he is interested in inpatient alcohol rehab at this time. Denies any active/passive suicidal/homicidal ideation, plan, intent and names protectiev factors (family, kids, hope for future, future oriented.)

## 2022-06-02 NOTE — BH CONSULTATION LIAISON ASSESSMENT NOTE - CURRENT MEDICATION
MEDICATIONS  (STANDING):  chlordiazePOXIDE 25 milliGRAM(s) Oral every 6 hours  folic acid 1 milliGRAM(s) Oral daily  multivitamin 1 Tablet(s) Oral daily  pantoprazole  Injectable 40 milliGRAM(s) IV Push two times a day  sodium chloride 0.9%. 1000 milliLiter(s) (75 mL/Hr) IV Continuous <Continuous>  thiamine 100 milliGRAM(s) Oral daily    MEDICATIONS  (PRN):  acetaminophen     Tablet .. 650 milliGRAM(s) Oral every 6 hours PRN Temp greater or equal to 38C (100.4F), Mild Pain (1 - 3)  aluminum hydroxide/magnesium hydroxide/simethicone Suspension 30 milliLiter(s) Oral every 4 hours PRN Dyspepsia  LORazepam   Injectable 2 milliGRAM(s) IV Push every 1 hour PRN Symptom-triggered: each CIWA -Ar score 8 or GREATER  melatonin 3 milliGRAM(s) Oral at bedtime PRN Insomnia  ondansetron Injectable 4 milliGRAM(s) IV Push every 8 hours PRN Nausea and/or Vomiting

## 2022-06-02 NOTE — BH CONSULTATION LIAISON ASSESSMENT NOTE - SUMMARY
27 yo male with years of chronic Polysubstance Abuse, mainly Alcohol Abuse (daily drinker), with PMH of pancreatitis, gastritis; attends [facility] outpt medical clinic last seen 4/19/22; outpt substance abuse services at [facility] (3/14/22); [facility] (1/30/22); any ED visits for intox/GI complaints; several intox stints; [facility] inpt psych 1/21/22-1/25/22 for mood; numerous CPEP stays for intox/substance related issues presenting in a state of alcohol intoxication with a BAL of 305; UTOX + for Benzo, and THC. Patient does NOT have significant (or even moderate) alcohol withdrawal symptoms. he has chronic gastritis secondary to years of heavy alcohol use. No indication of suicidality.

## 2022-06-02 NOTE — BH CONSULTATION LIAISON ASSESSMENT NOTE - NSBHMSETHTCONTENT_PSY_A_CORE
----- Message from Stacey M Lefort sent at 6/15/2017  2:04 PM CDT -----  Patient needs the physical therapy orders faxed to [facility] out on [facility] [place]. Please call the patient at [phone]. Thank you.  
Called and advised patient that her orders are in the system and she should be good to call and schedule her appointment for PT. She verbalized understanding.   
Unremarkable

## 2022-06-02 NOTE — BH CONSULTATION LIAISON ASSESSMENT NOTE - NSBHCHARTREVIEWVS_PSY_A_CORE FT
Vital Signs Last 24 Hrs  T(C): 36.9 (02 Jun 2022 11:09), Max: 37.1 (02 Jun 2022 00:57)  T(F): 98.4 (02 Jun 2022 11:09), Max: 98.7 (02 Jun 2022 00:57)  HR: 95 (02 Jun 2022 11:09) (93 - 110)  BP: 145/85 (02 Jun 2022 11:09) (117/65 - 154/94)  BP(mean): 91 (01 Jun 2022 19:03) (91 - 105)  RR: 17 (02 Jun 2022 11:09) (17 - 24)  SpO2: 97% (02 Jun 2022 11:09) (94% - 98%)

## 2022-06-02 NOTE — BH CONSULTATION LIAISON ASSESSMENT NOTE - NSBHCHARTREVIEWINVESTIGATE_PSY_A_CORE FT
CT Abdomen and Pelvis No Cont (06.01.22) Distal esophageal wall thickening, compatible with esophagitis. Recommend follow-up endoscopy for further assessment. Hepatic steatosis

## 2022-06-02 NOTE — BH CONSULTATION LIAISON ASSESSMENT NOTE - OTHER PAST PSYCHIATRIC HISTORY (INCLUDE DETAILS REGARDING ONSET, COURSE OF ILLNESS, INPATIENT/OUTPATIENT TREATMENT)
PSYCKES: Substance use disorder diagnosis (alcohol, tobacco; psychoactive substance); ADHD. PMH of pancreatitis, gatritis; attends [facility] outpt medical clinic last seen 4/19/22; outpt substance abuse services at [facility] (3/14/22); [facility] (1/30/22); any ED visits for intox/GI complaints; several intox stints; [facility] inpt psych 1/21/22-1/25/22 for mood; numerous CPEP stays for intox/substance related issues

## 2022-06-02 NOTE — PATIENT PROFILE ADULT - ..
02-Jun-2022 03:03:39 Prep Survey    Flowsheet Row Responses   [facility] [place] patient discharged from? [facility]   Is LACE score < 7 ? No   Emergency Room discharge w/ pulse ox? No   Eligibility [facility]   Date of Admission 05/12/22   Date of Discharge 05/13/22   Discharge Disposition Home or Self Care   Discharge diagnosis PROSTATECTOMY LAPAROSCOPIC WITH DAVINCI ROBOT   Does the patient have one of the following disease processes/diagnoses(primary or secondary)? General Surgery   Does the patient have Home health ordered? No   Is there a DME ordered? No   Prep survey completed? Yes          ZACH JOHNSON - Registered Nurse

## 2022-06-02 NOTE — PATIENT PROFILE ADULT - NSPRESCRALCFREQ_GEN_A_NUR
[facility] Initial Evaluation  Subjective:  Patient is a 51 year old female presenting with rehab right shoulder hpi.   Melly Huston is a 51 year old female with a right shoulder condition.  Occurance: insidious.  Condition occurred: at home.  This is a new condition     Patient reports pain:  Posterior (into elbow and ring/middle fingers).  Radiates to:  Elbow and hand.  Pain is described as aching and is constant and reported as 5/10.  Associated symptoms:  Tingling and painful arc. Pain is worse in the P.M..  Exacerbated by: cooking, cutting vegetables, holding shoulder into flexion, pain into R shoulder blade with R cervical rotation.           General health as reported by patient is good.  Pertinent medical history includes:  Cancer, menopausal and rheumatoid arthritis.        Current occupation is Computer work - admin management.            Red flags:  None as reported by the patient.                      The pt reports that she has had increasing R shoulder pain for a week and a half and has progressively gotten worse. She has a lot of pain and cannot sleep at night. She cannot hold her mouse with the R side anymore. It is weakness and not pain. Stretching out and raising her arm above her shoulder makes it feel better.     Objective:  System              Cervical/Thoracic Evaluation  Arom wnl cervical: Spurling's: R side reproduced pain into R shoulder blade, no increase in numbness/tingling in hand or referred pain into elbow      AROM:  AROM Cervical:    Flexion:          Full pain-free  Extension:       Full, but leaning to L  Rotation:         Left:     Right:  Side Bend:      Left: full pain-free     Right:  Limited and worsens pain    Strength: Pain with light resisted IR, ER, and abduction (IR worse)  Headaches: none  Cervical Myotomes:  normal                                       Shoulder Evaluation:  ROM:  AROM:  normal (Pain with light resisted IR, ER, and abduction  (IR worse))                                  Strength:    Flexion: Right: 5/5     Pain:                       Special Tests:      Left shoulder negative for the following special tests:  Neural Tension and Rotator cuff tear    Right shoulder negative for the following special tests:Impingement; Neural Tension and Rotator cuff tear  Palpation:      Right shoulder tenderness present at: Levator and Rhomboids  Mobility Tests:  normal                                                 General     ROS    Assessment/Plan:    Patient is a 51 year old female with right side shoulder complaints.    Patient has the following significant findings with corresponding treatment plan.                Diagnosis 1:  R shoulder/scapular pain  Pain -  manual therapy, splint/taping/bracing/orthotics, self management, education, directional preference exercise and home program  Decreased ROM/flexibility - manual therapy and therapeutic exercise  Decreased joint mobility - manual therapy and therapeutic exercise    Therapy Evaluation Codes:   1) History comprised of:   Personal factors that impact the plan of care:      None.    Comorbidity factors that impact the plan of care are:      None.     Medications impacting care: None.  2) Examination of Body Systems comprised of:   Body structures and functions that impact the plan of care:      Cervical spine and Shoulder.   Activity limitations that impact the plan of care are:      Cooking.  3) Clinical presentation characteristics are:   Stable/Uncomplicated.  4) Decision-Making    Low complexity using standardized patient assessment instrument and/or measureable assessment of functional outcome.  Cumulative Therapy Evaluation is: Low complexity.    Previous and current functional limitations:  (See Goal Flow Sheet for this information)    Short term and Long term goals: (See Goal Flow Sheet for this information)     Communication ability:  Patient appears to be able to clearly communicate  and understand verbal and written communication and follow directions correctly.  Treatment Explanation - The following has been discussed with the patient:   RX ordered/plan of care  Anticipated outcomes  Possible risks and side effects  This patient would benefit from PT intervention to resume normal activities.   Rehab potential is excellent.    Frequency:  1 X week, once daily  Duration:  for 6 weeks  Discharge Plan:  Achieve all LTG.  Independent in home treatment program.  Reach maximal therapeutic benefit.    Please refer to the daily flowsheet for treatment today, total treatment time and time spent performing 1:1 timed codes.        4 or more times a week

## 2022-06-02 NOTE — BH CONSULTATION LIAISON ASSESSMENT NOTE - NSBHCHARTREVIEWLAB_PSY_A_CORE FT
06-02    137  |  100  |  10  ----------------------------<  81  3.7   |  24  |  0.98    Ca    9.1      02 Jun 2022 07:47  Phos  2.8     06-02  Mg     2.3     06-02    TPro  7.8  /  Alb  3.8  /  TBili  1.1  /  DBili  x   /  AST  115<H>  /  ALT  89<H>  /  AlkPhos  72  06-02

## 2022-06-02 NOTE — PATIENT PROFILE ADULT - FALL HARM RISK - HARM RISK INTERVENTIONS
Assistance with ambulation/Assistance OOB with selected safe patient handling equipment/Communicate Risk of Fall with Harm to all staff/Discuss with provider need for PT consult/Monitor gait and stability/Reinforce activity limits and safety measures with patient and family/Tailored Fall Risk Interventions/Visual Cue: Yellow wristband and red socks/Bed in lowest position, wheels locked, appropriate side rails in place/Call bell, personal items and telephone in reach/Instruct patient to call for assistance before getting out of bed or chair/Non-slip footwear when patient is out of bed/[place] to call system/Physically safe environment - no spills, clutter or unnecessary equipment/Purposeful Proactive Rounding/Room/bathroom lighting operational, light cord in reach

## 2022-06-02 NOTE — PATIENT PROFILE ADULT - STATED REASON FOR ADMISSION
Tired of life. I have pancreatitis ([facility]). I have stomach pain for months. I drink a litre of Vodka/ Taqella per day.

## 2022-06-02 NOTE — BH CONSULTATION LIAISON ASSESSMENT NOTE - NSBHCONSULTRECOMMENDOTHER_PSY_A_CORE FT
- discontinue CO 1;1 as it is not clinically indicated   - advance diet to regular   - symptom triggered CIWA only; discontinue standing Librium as it is not clinically indicated. PRNs with perimeters set for score  - has capacity to leave AMA / make medical decisions

## 2022-06-02 NOTE — BH CONSULTATION LIAISON ASSESSMENT NOTE - RISK ASSESSMENT
Chronic risk factors: single, male gender, ongoing polysubstance abuse with associated medical issues; psychosocial stressors; rule out Axis II. Protective factors: young; no known suicide attempts; no known hx of aggression/violence; no known legal issues; motivated for help; articulate; stable domcile, has family support; access to health services; denies access to guns. No acute risk factors identified

## 2022-06-03 ENCOUNTER — INPATIENT (INPATIENT)
Facility: HOSPITAL | Age: 27
LOS: 1 days | Discharge: HOME | End: 2022-06-05
Attending: INTERNAL MEDICINE | Admitting: INTERNAL MEDICINE
Payer: MEDICAID

## 2022-06-03 VITALS
HEIGHT: 68 IN | OXYGEN SATURATION: 99 % | TEMPERATURE: 98 F | DIASTOLIC BLOOD PRESSURE: 86 MMHG | SYSTOLIC BLOOD PRESSURE: 117 MMHG | WEIGHT: 169.98 LBS | HEART RATE: 98 BPM | RESPIRATION RATE: 17 BRPM

## 2022-06-03 VITALS
OXYGEN SATURATION: 98 % | RESPIRATION RATE: 20 BRPM | TEMPERATURE: 99 F | DIASTOLIC BLOOD PRESSURE: 87 MMHG | HEART RATE: 86 BPM | SYSTOLIC BLOOD PRESSURE: 134 MMHG

## 2022-06-03 LAB
ALBUMIN SERPL ELPH-MCNC: 3.3 G/DL — SIGNIFICANT CHANGE UP (ref 3.3–5)
ALP SERPL-CCNC: 68 U/L — SIGNIFICANT CHANGE UP (ref 40–120)
ALT FLD-CCNC: 67 U/L — SIGNIFICANT CHANGE UP (ref 12–78)
ANION GAP SERPL CALC-SCNC: 8 MMOL/L — SIGNIFICANT CHANGE UP (ref 5–17)
AST SERPL-CCNC: 77 U/L — HIGH (ref 15–37)
BILIRUB SERPL-MCNC: 1.2 MG/DL — SIGNIFICANT CHANGE UP (ref 0.2–1.2)
BUN SERPL-MCNC: 9 MG/DL — SIGNIFICANT CHANGE UP (ref 7–23)
CALCIUM SERPL-MCNC: 9 MG/DL — SIGNIFICANT CHANGE UP (ref 8.5–10.1)
CHLORIDE SERPL-SCNC: 101 MMOL/L — SIGNIFICANT CHANGE UP (ref 96–108)
CO2 SERPL-SCNC: 29 MMOL/L — SIGNIFICANT CHANGE UP (ref 22–31)
CREAT SERPL-MCNC: 0.74 MG/DL — SIGNIFICANT CHANGE UP (ref 0.5–1.3)
EGFR: 127 ML/MIN/1.73M2 — SIGNIFICANT CHANGE UP
GLUCOSE SERPL-MCNC: 86 MG/DL — SIGNIFICANT CHANGE UP (ref 70–99)
HCT VFR BLD CALC: 36.3 % — LOW (ref 39–50)
HGB BLD-MCNC: 12.3 G/DL — LOW (ref 13–17)
MCHC RBC-ENTMCNC: 33.9 G/DL — SIGNIFICANT CHANGE UP (ref 32–36)
MCHC RBC-ENTMCNC: 34.6 PG — HIGH (ref 27–34)
MCV RBC AUTO: 102 FL — HIGH (ref 80–100)
NRBC # BLD: 0 /100 WBCS — SIGNIFICANT CHANGE UP (ref 0–0)
PLATELET # BLD AUTO: 105 K/UL — LOW (ref 150–400)
POTASSIUM SERPL-MCNC: 3.1 MMOL/L — LOW (ref 3.5–5.3)
POTASSIUM SERPL-SCNC: 3.1 MMOL/L — LOW (ref 3.5–5.3)
PROT SERPL-MCNC: 6.7 GM/DL — SIGNIFICANT CHANGE UP (ref 6–8.3)
RBC # BLD: 3.56 M/UL — LOW (ref 4.2–5.8)
RBC # FLD: 12.9 % — SIGNIFICANT CHANGE UP (ref 10.3–14.5)
SODIUM SERPL-SCNC: 138 MMOL/L — SIGNIFICANT CHANGE UP (ref 135–145)
WBC # BLD: 3.47 K/UL — LOW (ref 3.8–10.5)
WBC # FLD AUTO: 3.47 K/UL — LOW (ref 3.8–10.5)

## 2022-06-03 PROCEDURE — 99285 EMERGENCY DEPT VISIT HI MDM: CPT

## 2022-06-03 PROCEDURE — 99233 SBSQ HOSP IP/OBS HIGH 50: CPT

## 2022-06-03 RX ORDER — POTASSIUM CHLORIDE 20 MEQ
10 PACKET (EA) ORAL
Refills: 0 | Status: COMPLETED | OUTPATIENT
Start: 2022-06-03 | End: 2022-06-03

## 2022-06-03 RX ORDER — POTASSIUM CHLORIDE 20 MEQ
40 PACKET (EA) ORAL EVERY 4 HOURS
Refills: 0 | Status: DISCONTINUED | OUTPATIENT
Start: 2022-06-03 | End: 2022-06-03

## 2022-06-03 RX ORDER — SODIUM CHLORIDE 9 MG/ML
1000 INJECTION INTRAMUSCULAR; INTRAVENOUS; SUBCUTANEOUS ONCE
Refills: 0 | Status: COMPLETED | OUTPATIENT
Start: 2022-06-03 | End: 2022-06-03

## 2022-06-03 RX ORDER — POTASSIUM CHLORIDE 20 MEQ
40 PACKET (EA) ORAL ONCE
Refills: 0 | Status: COMPLETED | OUTPATIENT
Start: 2022-06-03 | End: 2022-06-03

## 2022-06-03 RX ADMIN — Medication 25 MILLIGRAM(S): at 00:02

## 2022-06-03 RX ADMIN — Medication 25 MILLIGRAM(S): at 09:41

## 2022-06-03 RX ADMIN — Medication 650 MILLIGRAM(S): at 01:20

## 2022-06-03 RX ADMIN — Medication 40 MILLIEQUIVALENT(S): at 12:00

## 2022-06-03 RX ADMIN — Medication 1 TABLET(S): at 11:06

## 2022-06-03 RX ADMIN — Medication 1 MILLIGRAM(S): at 11:07

## 2022-06-03 RX ADMIN — SODIUM CHLORIDE 1000 MILLILITER(S): 9 INJECTION INTRAMUSCULAR; INTRAVENOUS; SUBCUTANEOUS at 23:58

## 2022-06-03 RX ADMIN — Medication 100 MILLIGRAM(S): at 23:59

## 2022-06-03 RX ADMIN — PANTOPRAZOLE SODIUM 40 MILLIGRAM(S): 20 TABLET, DELAYED RELEASE ORAL at 07:13

## 2022-06-03 RX ADMIN — Medication 650 MILLIGRAM(S): at 00:27

## 2022-06-03 RX ADMIN — Medication 100 MILLIGRAM(S): at 11:06

## 2022-06-03 RX ADMIN — Medication 100 MILLIEQUIVALENT(S): at 12:00

## 2022-06-03 NOTE — ED PROVIDER NOTE - CLINICAL SUMMARY MEDICAL DECISION MAKING FREE TEXT BOX
pancreatitis as had before leaving a. pancreatitis as had before leaving a.  I read ekg as nsr rate 78, no st elevation or depression, qtc 433, narrow qrs, normal axis.

## 2022-06-03 NOTE — PROGRESS NOTE ADULT - ASSESSMENT
History of Present Illness:   26 years old male with PMH Of chronic alcohol dependence and ongoing alcohol abuse, alcohol withdrawal seizures, black outs, presented c/o epigastric abd pain associated n/v x 3 days. Pt admit alcohol drinking one liter per day. His last drink was three days ago and he drank a pint on that day. He felt depressed and had some suicidal ideations but no plan. he quit alcohol since then. He vomited bright red blood one episode no clots 3 days. No report of any fall or seizures.    Pt denies headache, dizziness, blurred visions, light sensitivities, focal/distal weakness or numbness, cough, sob, chest pain, fever, chills, dysuria or irregular bowel movements.  In the ER patient was found to be in active acute alcohol withdrawal and hospitalist service was consulted for further management.   patient reported no fever at home. no hematochezia or melena.    Acute alcohol withdrawal in a patient with chronic alcohol dependence and ongoing alcohol abuse/acute intoxication. patient is at risk for severe withdrawal based on PAWSS score (hx of seizure, black outs, withdrawals). CIWA score still elevated. cont scheduled librium and prn symptom triggered iv ativan.Cont IVF and MVI, folate. seizure and aspiration precautions. At high risk for worsening based on timing of his last alcohol drink.   Hematemesis. esophagitis on CT abd. H and H dropped likely from hemodilution. No more active bleeding. stable Hb. cont iv PPI.   Alcoholic hepatitis. Chronic Trend.  Suicidal ideations. appreciated psych consult recs. off 1:1.   Pancytopenia. cont MVI  Overweight. diet and exercise.     DVT ppx: SCDs not on. spoke with nurse to put SCDs on.    Full code

## 2022-06-03 NOTE — ED PROVIDER NOTE - OBJECTIVE STATEMENT
26 years old male with PMH Of chronic alcohol dependence and ongoing alcohol abuse, alcohol withdrawal seizures, black outs, presented c/o epigastric abd pain associated with pancreatitis. was admitted 6/1 to 6/3 under mrn [id number] and left [facility] for fam emergency. notes drinking on his way home and having an episode of blacking out. he returns with the same pain. ros neg for ha, vision loss, rhinorrhea, cp, sob, numbness, rash bleeding, dysuria, anxiety, testicular pain

## 2022-06-03 NOTE — DISCHARGE NOTE PROVIDER - HOSPITAL COURSE
History of Present Illness:   26 years old male with PMH Of chronic alcohol dependence and ongoing alcohol abuse, alcohol withdrawal seizures, black outs, presented c/o epigastric abd pain associated n/v x 3 days. Pt admit alcohol drinking one liter per day. His last drink was three days ago and he drank a pint on that day. He felt depressed and had some suicidal ideations but no plan. he quit alcohol since then. He vomited bright red blood one episode no clots 3 days. No report of any fall or seizures.    Pt denies headache, dizziness, blurred visions, light sensitivities, focal/distal weakness or numbness, cough, sob, chest pain, fever, chills, dysuria or irregular bowel movements.  In the ER patient was found to be in active acute alcohol withdrawal and hospitalist service was consulted for further management.   patient reported no fever at home. no hematochezia or melena.    Acute alcohol withdrawal in a patient with chronic alcohol dependence and ongoing alcohol abuse/acute intoxication.   Hematemesis. esophagitis on CT abd. H and H dropped likely from hemodilution. No more active bleeding. stable Hb.   Alcoholic hepatitis.  Suicidal ideations. appreciated psych consult recs. off 1:1.   Pancytopenia. cont MVI  Overweight. diet and exercise.    Patient decided to leave AMA. psychiatrist has already cleared patient from her point of view. Patient has capacity to make medical decisions and understand that he can have life threatening alcohol withdrawal if leaves AMA. Recommended to stay for treatment.

## 2022-06-03 NOTE — DISCHARGE NOTE PROVIDER - NSDCCPCAREPLAN_GEN_ALL_CORE_FT
PRINCIPAL DISCHARGE DIAGNOSIS  Diagnosis: Alcohol withdrawal  Assessment and Plan of Treatment:       SECONDARY DISCHARGE DIAGNOSES  Diagnosis: Suicidal ideation  Assessment and Plan of Treatment:

## 2022-06-03 NOTE — ED ADULT TRIAGE NOTE - CHIEF COMPLAINT QUOTE
BIBA for abdominal pain from shelter. patient stated he was admitted for 3 days for pancreatitis, signed AMA earlier because her daughter fell. as per emt, they would not let him back in the shelter without discharge papers. patient admits to drinking a little bit prior to arrival

## 2022-06-03 NOTE — PROGRESS NOTE ADULT - SUBJECTIVE AND OBJECTIVE BOX
CHIEF COMPLAINT: Alcohol withdrawal follow up   abd pain improving but CIWA score still >7.   no n/v/cp/sob  no dizziness.       PHYSICAL EXAM:    GENERAL: Overweight, no acute distress   CHEST/LUNG: Clear to ausculation bilaterally, no wheezing, no crackles   HEART: Regular rate and rhythm; No murmurs, rubs  ABDOMEN: Soft, Nontender, Nondistended; Bowel sounds present  EXTREMITIES:  Moving all four extremities spontaneously, No clubbing, cyanosis, or edema. Shakiness hands.   NERVOUS SYSTEM:  Grossly non focal.  Psychiatry: AAO x 3, mood is appropriate       OBJECTIVE DATA:     Vital Signs Last 24 Hrs  T(C): 37.2 (03 Jun 2022 11:25), Max: 37.2 (03 Jun 2022 11:25)  T(F): 98.9 (03 Jun 2022 11:25), Max: 98.9 (03 Jun 2022 11:25)  HR: 86 (03 Jun 2022 11:25) (64 - 101)  BP: 134/87 (03 Jun 2022 11:25) (122/83 - 141/89)  BP(mean): --  RR: 20 (03 Jun 2022 11:25) (17 - 20)  SpO2: 98% (03 Jun 2022 11:25) (98% - 98%)           Daily     Daily   LABS:                        12.3   3.47  )-----------( 105      ( 03 Jun 2022 06:40 )             36.3             06-03    138  |  101  |  9   ----------------------------<  86  3.1<L>   |  29  |  0.74    Ca    9.0      03 Jun 2022 06:40  Phos  2.8     06-02  Mg     2.3     06-02    TPro  6.7  /  Alb  3.3  /  TBili  1.2  /  DBili  x   /  AST  77<H>  /  ALT  67  /  AlkPhos  68  06-03    MEDICATIONS  (STANDING):  folic acid 1 milliGRAM(s) Oral daily  multivitamin 1 Tablet(s) Oral daily  pantoprazole  Injectable 40 milliGRAM(s) IV Push two times a day  potassium chloride  10 mEq/100 mL IVPB 10 milliEquivalent(s) IV Intermittent every 1 hour  sodium chloride 0.9%. 1000 milliLiter(s) (75 mL/Hr) IV Continuous <Continuous>  thiamine 100 milliGRAM(s) Oral daily    MEDICATIONS  (PRN):  acetaminophen     Tablet .. 650 milliGRAM(s) Oral every 6 hours PRN Temp greater or equal to 38C (100.4F), Mild Pain (1 - 3)  aluminum hydroxide/magnesium hydroxide/simethicone Suspension 30 milliLiter(s) Oral every 4 hours PRN Dyspepsia  chlordiazePOXIDE 25 milliGRAM(s) Oral three times a day PRN alcohol cravings / withdrawal symptoms CIWA score 8-11  LORazepam   Injectable 2 milliGRAM(s) IV Push every 4 hours PRN CIWA score > 12  ondansetron Injectable 4 milliGRAM(s) IV Push every 8 hours PRN Nausea and/or Vomiting      
CHIEF COMPLAINT: Alcohol withdrawal follow up   abd pain better per patient  no n/v/cp/sob  no dizziness.       PHYSICAL EXAM:    GENERAL: Overweight, no acute distress   CHEST/LUNG: Clear to ausculation bilaterally, no wheezing, no crackles   HEART: Regular rate and rhythm; No murmurs, rubs  ABDOMEN: Soft, Nontender, Nondistended; Bowel sounds present  EXTREMITIES:  Moving all four extremities spontaneously, No clubbing, cyanosis, or edema  NERVOUS SYSTEM:  Grossly non focal.  Psychiatry: AAO x 3, mood is appropriate       OBJECTIVE DATA:   Vital Signs Last 24 Hrs  T(C): 36.9 (2022 11:09), Max: 37.1 (2022 00:57)  T(F): 98.4 (2022 11:09), Max: 98.7 (2022 00:57)  HR: 95 (2022 11:09) (93 - 110)  BP: 145/85 (2022 11:09) (112/74 - 154/94)  BP(mean): 91 (2022 19:03) (91 - 105)  RR: 17 (2022 11:09) (17 - 24)  SpO2: 97% (2022 11:09) (94% - 98%)           Daily Height in cm: 175.26 (2022 14:26)    Daily Weight in k.6 (2022 02:00)  LABS:                        12.7   3.42  )-----------( 135      ( 2022 07:47 )             37.4             06-02    137  |  100  |  10  ----------------------------<  81  3.7   |  24  |  0.98    Ca    9.1      2022 07:47  Phos  2.8     06-02  Mg     2.3     06-02    TPro  7.8  /  Alb  3.8  /  TBili  1.1  /  DBili  x   /  AST  115<H>  /  ALT  89<H>  /  AlkPhos  72  06-02                     Interval Radiology studies: reviewed by me    < from: CT Abdomen and Pelvis No Cont (22 @ 17:35) >  1. Distal esophageal wall thickening, compatible withesophagitis.   Recommend follow-up endoscopy for further assessment.  2. Hepatic steatosis.  3. No bowel obstruction.    < end of copied text >      MEDICATIONS  (STANDING):  chlordiazePOXIDE 25 milliGRAM(s) Oral every 6 hours  folic acid 1 milliGRAM(s) Oral daily  multivitamin 1 Tablet(s) Oral daily  pantoprazole  Injectable 40 milliGRAM(s) IV Push two times a day  sodium chloride 0.9%. 1000 milliLiter(s) (75 mL/Hr) IV Continuous <Continuous>  thiamine 100 milliGRAM(s) Oral daily    MEDICATIONS  (PRN):  acetaminophen     Tablet .. 650 milliGRAM(s) Oral every 6 hours PRN Temp greater or equal to 38C (100.4F), Mild Pain (1 - 3)  aluminum hydroxide/magnesium hydroxide/simethicone Suspension 30 milliLiter(s) Oral every 4 hours PRN Dyspepsia  LORazepam   Injectable 2 milliGRAM(s) IV Push every 1 hour PRN Symptom-triggered: each CIWA -Ar score 8 or GREATER  melatonin 3 milliGRAM(s) Oral at bedtime PRN Insomnia  ondansetron Injectable 4 milliGRAM(s) IV Push every 8 hours PRN Nausea and/or Vomiting

## 2022-06-03 NOTE — ED PROVIDER NOTE - PHYSICAL EXAMINATION
Gen: Alert, NAD  Head: NC, AT   Eyes: PERRL, EOMI, normal lids/conjunctiva  ENT: normal hearing, patent oropharynx without erythema/exudate, uvula midline  Neck: supple, no tenderness, Trachea midline  Pulm: Bilateral BS, normal resp effort, no wheeze/stridor/retractions  CV: RRR, no M/R/G, 2+ radial and dp pulses bl, no edema  Abd: soft, mild abd ttp. +BS, no hepatosplenomegaly  Mskel: extremities x4 with normal ROM and no joint effusions. no ctl spine ttp.   Skin: no rash, no bruising   Neuro: AAOx3, no sensory/motor deficits, CN 2-12 intact

## 2022-06-04 DIAGNOSIS — F10.10 ALCOHOL ABUSE, UNCOMPLICATED: ICD-10-CM

## 2022-06-04 DIAGNOSIS — K85.90 ACUTE PANCREATITIS WITHOUT NECROSIS OR INFECTION, UNSPECIFIED: ICD-10-CM

## 2022-06-04 LAB
ALBUMIN SERPL ELPH-MCNC: 3.2 G/DL — LOW (ref 3.3–5)
ALBUMIN SERPL ELPH-MCNC: 3.8 G/DL — SIGNIFICANT CHANGE UP (ref 3.3–5)
ALP SERPL-CCNC: 68 U/L — SIGNIFICANT CHANGE UP (ref 40–120)
ALP SERPL-CCNC: 87 U/L — SIGNIFICANT CHANGE UP (ref 40–120)
ALT FLD-CCNC: 80 U/L — HIGH (ref 12–78)
ALT FLD-CCNC: 92 U/L — HIGH (ref 12–78)
ANION GAP SERPL CALC-SCNC: 10 MMOL/L — SIGNIFICANT CHANGE UP (ref 5–17)
ANION GAP SERPL CALC-SCNC: 9 MMOL/L — SIGNIFICANT CHANGE UP (ref 5–17)
AST SERPL-CCNC: 110 U/L — HIGH (ref 15–37)
AST SERPL-CCNC: 166 U/L — HIGH (ref 15–37)
BASOPHILS # BLD AUTO: 0.02 K/UL — SIGNIFICANT CHANGE UP (ref 0–0.2)
BASOPHILS NFR BLD AUTO: 0.5 % — SIGNIFICANT CHANGE UP (ref 0–2)
BILIRUB DIRECT SERPL-MCNC: 0.2 MG/DL — SIGNIFICANT CHANGE UP (ref 0–0.3)
BILIRUB INDIRECT FLD-MCNC: 0.3 MG/DL — SIGNIFICANT CHANGE UP (ref 0.2–1)
BILIRUB SERPL-MCNC: 0.5 MG/DL — SIGNIFICANT CHANGE UP (ref 0.2–1.2)
BILIRUB SERPL-MCNC: 0.5 MG/DL — SIGNIFICANT CHANGE UP (ref 0.2–1.2)
BUN SERPL-MCNC: 7 MG/DL — SIGNIFICANT CHANGE UP (ref 7–23)
BUN SERPL-MCNC: 8 MG/DL — SIGNIFICANT CHANGE UP (ref 7–23)
CALCIUM SERPL-MCNC: 8.2 MG/DL — LOW (ref 8.5–10.1)
CALCIUM SERPL-MCNC: 9.3 MG/DL — SIGNIFICANT CHANGE UP (ref 8.5–10.1)
CHLORIDE SERPL-SCNC: 107 MMOL/L — SIGNIFICANT CHANGE UP (ref 96–108)
CHLORIDE SERPL-SCNC: 107 MMOL/L — SIGNIFICANT CHANGE UP (ref 96–108)
CO2 SERPL-SCNC: 25 MMOL/L — SIGNIFICANT CHANGE UP (ref 22–31)
CO2 SERPL-SCNC: 28 MMOL/L — SIGNIFICANT CHANGE UP (ref 22–31)
CREAT SERPL-MCNC: 0.66 MG/DL — SIGNIFICANT CHANGE UP (ref 0.5–1.3)
CREAT SERPL-MCNC: 0.91 MG/DL — SIGNIFICANT CHANGE UP (ref 0.5–1.3)
EGFR: 118 ML/MIN/1.73M2 — SIGNIFICANT CHANGE UP
EGFR: 131 ML/MIN/1.73M2 — SIGNIFICANT CHANGE UP
EOSINOPHIL # BLD AUTO: 0.01 K/UL — SIGNIFICANT CHANGE UP (ref 0–0.5)
EOSINOPHIL NFR BLD AUTO: 0.2 % — SIGNIFICANT CHANGE UP (ref 0–6)
ETHANOL SERPL-MCNC: 239 MG/DL — HIGH (ref 0–10)
FLUAV AG NPH QL: SIGNIFICANT CHANGE UP
FLUBV AG NPH QL: SIGNIFICANT CHANGE UP
GLUCOSE SERPL-MCNC: 86 MG/DL — SIGNIFICANT CHANGE UP (ref 70–99)
GLUCOSE SERPL-MCNC: 93 MG/DL — SIGNIFICANT CHANGE UP (ref 70–99)
HCT VFR BLD CALC: 36.8 % — LOW (ref 39–50)
HGB BLD-MCNC: 12.5 G/DL — LOW (ref 13–17)
IMM GRANULOCYTES NFR BLD AUTO: 0.2 % — SIGNIFICANT CHANGE UP (ref 0–1.5)
LIDOCAIN IGE QN: 546 U/L — HIGH (ref 73–393)
LYMPHOCYTES # BLD AUTO: 1.75 K/UL — SIGNIFICANT CHANGE UP (ref 1–3.3)
LYMPHOCYTES # BLD AUTO: 43.3 % — SIGNIFICANT CHANGE UP (ref 13–44)
MAGNESIUM SERPL-MCNC: 2 MG/DL — SIGNIFICANT CHANGE UP (ref 1.6–2.6)
MAGNESIUM SERPL-MCNC: 2.4 MG/DL — SIGNIFICANT CHANGE UP (ref 1.6–2.6)
MCHC RBC-ENTMCNC: 34 G/DL — SIGNIFICANT CHANGE UP (ref 32–36)
MCHC RBC-ENTMCNC: 34.8 PG — HIGH (ref 27–34)
MCV RBC AUTO: 102.5 FL — HIGH (ref 80–100)
MONOCYTES # BLD AUTO: 0.42 K/UL — SIGNIFICANT CHANGE UP (ref 0–0.9)
MONOCYTES NFR BLD AUTO: 10.4 % — SIGNIFICANT CHANGE UP (ref 2–14)
NEUTROPHILS # BLD AUTO: 1.83 K/UL — SIGNIFICANT CHANGE UP (ref 1.8–7.4)
NEUTROPHILS NFR BLD AUTO: 45.4 % — SIGNIFICANT CHANGE UP (ref 43–77)
NRBC # BLD: 0 /100 WBCS — SIGNIFICANT CHANGE UP (ref 0–0)
PHOSPHATE SERPL-MCNC: 3.2 MG/DL — SIGNIFICANT CHANGE UP (ref 2.5–4.5)
PLATELET # BLD AUTO: 104 K/UL — LOW (ref 150–400)
POTASSIUM SERPL-MCNC: 2.9 MMOL/L — CRITICAL LOW (ref 3.5–5.3)
POTASSIUM SERPL-MCNC: 3.9 MMOL/L — SIGNIFICANT CHANGE UP (ref 3.5–5.3)
POTASSIUM SERPL-SCNC: 2.9 MMOL/L — CRITICAL LOW (ref 3.5–5.3)
POTASSIUM SERPL-SCNC: 3.9 MMOL/L — SIGNIFICANT CHANGE UP (ref 3.5–5.3)
PROT SERPL-MCNC: 6.5 GM/DL — SIGNIFICANT CHANGE UP (ref 6–8.3)
PROT SERPL-MCNC: 7.8 GM/DL — SIGNIFICANT CHANGE UP (ref 6–8.3)
RBC # BLD: 3.59 M/UL — LOW (ref 4.2–5.8)
RBC # FLD: 12.8 % — SIGNIFICANT CHANGE UP (ref 10.3–14.5)
SARS-COV-2 RNA SPEC QL NAA+PROBE: SIGNIFICANT CHANGE UP
SODIUM SERPL-SCNC: 142 MMOL/L — SIGNIFICANT CHANGE UP (ref 135–145)
SODIUM SERPL-SCNC: 144 MMOL/L — SIGNIFICANT CHANGE UP (ref 135–145)
WBC # BLD: 4.04 K/UL — SIGNIFICANT CHANGE UP (ref 3.8–10.5)
WBC # FLD AUTO: 4.04 K/UL — SIGNIFICANT CHANGE UP (ref 3.8–10.5)

## 2022-06-04 PROCEDURE — 12345: CPT | Mod: NC

## 2022-06-04 PROCEDURE — 93010 ELECTROCARDIOGRAM REPORT: CPT

## 2022-06-04 RX ORDER — POTASSIUM CHLORIDE 20 MEQ
40 PACKET (EA) ORAL EVERY 4 HOURS
Refills: 0 | Status: COMPLETED | OUTPATIENT
Start: 2022-06-04 | End: 2022-06-04

## 2022-06-04 RX ORDER — SODIUM CHLORIDE 9 MG/ML
1000 INJECTION, SOLUTION INTRAVENOUS
Refills: 0 | Status: DISCONTINUED | OUTPATIENT
Start: 2022-06-04 | End: 2022-06-05

## 2022-06-04 RX ORDER — ONDANSETRON 8 MG/1
4 TABLET, FILM COATED ORAL EVERY 4 HOURS
Refills: 0 | Status: DISCONTINUED | OUTPATIENT
Start: 2022-06-04 | End: 2022-06-05

## 2022-06-04 RX ORDER — FAMOTIDINE 10 MG/ML
20 INJECTION INTRAVENOUS
Refills: 0 | Status: DISCONTINUED | OUTPATIENT
Start: 2022-06-04 | End: 2022-06-05

## 2022-06-04 RX ORDER — MORPHINE SULFATE 50 MG/1
2 CAPSULE, EXTENDED RELEASE ORAL EVERY 4 HOURS
Refills: 0 | Status: DISCONTINUED | OUTPATIENT
Start: 2022-06-04 | End: 2022-06-05

## 2022-06-04 RX ORDER — FAMOTIDINE 10 MG/ML
20 INJECTION INTRAVENOUS EVERY 12 HOURS
Refills: 0 | Status: DISCONTINUED | OUTPATIENT
Start: 2022-06-04 | End: 2022-06-04

## 2022-06-04 RX ORDER — INFLUENZA VIRUS VACCINE 15; 15; 15; 15 UG/.5ML; UG/.5ML; UG/.5ML; UG/.5ML
0.5 SUSPENSION INTRAMUSCULAR ONCE
Refills: 0 | Status: DISCONTINUED | OUTPATIENT
Start: 2022-06-04 | End: 2022-06-05

## 2022-06-04 RX ORDER — LANOLIN ALCOHOL/MO/W.PET/CERES
3 CREAM (GRAM) TOPICAL AT BEDTIME
Refills: 0 | Status: DISCONTINUED | OUTPATIENT
Start: 2022-06-04 | End: 2022-06-05

## 2022-06-04 RX ORDER — THIAMINE MONONITRATE (VIT B1) 100 MG
100 TABLET ORAL DAILY
Refills: 0 | Status: DISCONTINUED | OUTPATIENT
Start: 2022-06-04 | End: 2022-06-05

## 2022-06-04 RX ORDER — ACETAMINOPHEN 500 MG
650 TABLET ORAL EVERY 6 HOURS
Refills: 0 | Status: DISCONTINUED | OUTPATIENT
Start: 2022-06-04 | End: 2022-06-05

## 2022-06-04 RX ORDER — POTASSIUM CHLORIDE 20 MEQ
10 PACKET (EA) ORAL
Refills: 0 | Status: COMPLETED | OUTPATIENT
Start: 2022-06-04 | End: 2022-06-04

## 2022-06-04 RX ADMIN — SODIUM CHLORIDE 200 MILLILITER(S): 9 INJECTION, SOLUTION INTRAVENOUS at 04:22

## 2022-06-04 RX ADMIN — Medication 100 MILLIEQUIVALENT(S): at 08:46

## 2022-06-04 RX ADMIN — Medication 2 MILLIGRAM(S): at 08:05

## 2022-06-04 RX ADMIN — Medication 650 MILLIGRAM(S): at 23:21

## 2022-06-04 RX ADMIN — SODIUM CHLORIDE 1000 MILLILITER(S): 9 INJECTION INTRAMUSCULAR; INTRAVENOUS; SUBCUTANEOUS at 01:00

## 2022-06-04 RX ADMIN — SODIUM CHLORIDE 200 MILLILITER(S): 9 INJECTION, SOLUTION INTRAVENOUS at 12:03

## 2022-06-04 RX ADMIN — FAMOTIDINE 20 MILLIGRAM(S): 10 INJECTION INTRAVENOUS at 05:03

## 2022-06-04 RX ADMIN — Medication 30 MILLILITER(S): at 05:03

## 2022-06-04 RX ADMIN — FAMOTIDINE 20 MILLIGRAM(S): 10 INJECTION INTRAVENOUS at 17:29

## 2022-06-04 RX ADMIN — Medication 40 MILLIEQUIVALENT(S): at 09:58

## 2022-06-04 RX ADMIN — Medication 2 MILLIGRAM(S): at 22:14

## 2022-06-04 RX ADMIN — Medication 100 MILLIGRAM(S): at 12:57

## 2022-06-04 RX ADMIN — Medication 40 MILLIEQUIVALENT(S): at 14:08

## 2022-06-04 RX ADMIN — Medication 30 MILLILITER(S): at 22:13

## 2022-06-04 RX ADMIN — SODIUM CHLORIDE 200 MILLILITER(S): 9 INJECTION, SOLUTION INTRAVENOUS at 18:42

## 2022-06-04 RX ADMIN — Medication 100 MILLIEQUIVALENT(S): at 09:58

## 2022-06-04 RX ADMIN — SODIUM CHLORIDE 200 MILLILITER(S): 9 INJECTION, SOLUTION INTRAVENOUS at 08:17

## 2022-06-04 RX ADMIN — SODIUM CHLORIDE 200 MILLILITER(S): 9 INJECTION, SOLUTION INTRAVENOUS at 08:46

## 2022-06-04 NOTE — ED ADULT NURSE NOTE - ED STAT RN HANDOFF DETAILS
Report given to receiving ED Hold RN Vero Daniels, pts history, current condition and reason for admission discussed, safety concerns addressed and reviewed, pt currently in stable condition, IV flushes for patency and site shows no signs or symptoms of infiltrate, dressing is clean dry and intact, pt is aware of plan of care. Pt education deemed successful at time of report after patient demonstrates successful teach back for proficiency. Pt is A&Ox3, resting comfortably in bed, appears in NAD. Pending bed assignment on med surg floor.

## 2022-06-04 NOTE — ED ADULT NURSE NOTE - NSIMPLEMENTINTERV_GEN_ALL_ED
Implemented All Fall Risk Interventions:  [place] to call system. Call bell, personal items and telephone within reach. Instruct patient to call for assistance. Room bathroom lighting operational. Non-slip footwear when patient is off stretcher. Physically safe environment: no spills, clutter or unnecessary equipment. Stretcher in lowest position, wheels locked, appropriate side rails in place. Provide visual cue, wrist band, yellow gown, etc. Monitor gait and stability. Monitor for mental status changes and reorient to person, place, and time. Review medications for side effects contributing to fall risk. Reinforce activity limits and safety measures with patient and family.

## 2022-06-04 NOTE — CHART NOTE - NSCHARTNOTEFT_GEN_A_CORE
seen and examined  was here two days ago  david out rubio  seen by psych on thursday, currently no si/hi   c/w ciwa, advance diet    Vital Signs Last 24 Hrs  T(C): 37.1 (04 Jun 2022 11:27), Max: 37.1 (04 Jun 2022 11:27)  T(F): 98.7 (04 Jun 2022 11:27), Max: 98.7 (04 Jun 2022 11:27)  HR: 82 (04 Jun 2022 11:27) (80 - 98)  BP: 134/98 (04 Jun 2022 11:27) (108/68 - 134/98)  BP(mean): --  RR: 17 (04 Jun 2022 11:27) (17 - 18)  SpO2: 99% (04 Jun 2022 11:27) (97% - 99%)                            12.5   4.04  )-----------( 104      ( 04 Jun 2022 00:34 )             36.8     06-04    142  |  107  |  7   ----------------------------<  86  2.9<LL>   |  25  |  0.66    Ca    8.2<L>      04 Jun 2022 07:06  Phos  3.2     06-04  Mg     2.0     06-04    TPro  6.5  /  Alb  3.2<L>  /  TBili  0.5  /  DBili  0.2  /  AST  166<H>  /  ALT  92<H>  /  AlkPhos  68  06-04

## 2022-06-04 NOTE — PATIENT PROFILE ADULT - FALL HARM RISK - UNIVERSAL INTERVENTIONS
Bed in lowest position, wheels locked, appropriate side rails in place/Call bell, personal items and telephone in reach/Instruct patient to call for assistance before getting out of bed or chair/Non-slip footwear when patient is out of bed/[place] to call system/Physically safe environment - no spills, clutter or unnecessary equipment/Purposeful Proactive Rounding/Room/bathroom lighting operational, light cord in reach

## 2022-06-04 NOTE — DISCHARGE NOTE PROVIDER - HOSPITAL COURSE
Patient is a 27 yo M with PMHx of Etoh abuse who presents with worsening ab pain.      Problem/Plan - 1:  ·  Problem: Acute pancreatitis.   ·  Plan: - was here 2 days ago signed out ama bc daughter went to hospital  today feels well no abd pain requesting breakfast  dc w outpt f/u  alcohol cessations encouraged     Problem/Plan - 2:  ·  Problem: ETOH abuse.   ·  Plan: - Alcohol level on admission as above  ciwa 0        pt seen and examined 45 min spent on dc planning     Lab test review, Radiology Review, Vitals review, Consultant review and discussion, Physical examination, IDR, Assessment and plan; Plan discussion with patient and family    Patient is a 25 yo M with PMHx of Etoh abuse who presents with worsening ab pain.      Problem/Plan - 1:  ·  Problem: Acute pancreatitis.   ·  Plan: - was here 2 days ago signed out ama bc daughter went to hospital  today feels well no abd pain requesting breakfast  dc w outpt f/u  alcohol cessations encouraged   seen by psych on Thursday, no si/hi , stable for dc     Problem/Plan - 2:  ·  Problem: ETOH abuse.   ·  Plan: - Alcohol level on admission as above  ciwa 0        pt seen and examined 45 min spent on dc planning     Lab test review, Radiology Review, Vitals review, Consultant review and discussion, Physical examination, IDR, Assessment and plan; Plan discussion with patient and family

## 2022-06-04 NOTE — DISCHARGE NOTE PROVIDER - NSDCCPCAREPLAN_GEN_ALL_CORE_FT
PRINCIPAL DISCHARGE DIAGNOSIS  Diagnosis: Pancreatitis  Assessment and Plan of Treatment: dont drink alcohol

## 2022-06-04 NOTE — DISCHARGE NOTE PROVIDER - NSDCMRMEDTOKEN_GEN_ALL_CORE_FT
folic acid 1 mg oral tablet: 1 tab(s) orally once a day  Multiple Vitamins oral tablet: 1 tab(s) orally once a day  Protonix 40 mg oral delayed release tablet: 40 tab(s) orally once a day

## 2022-06-04 NOTE — ED ADULT NURSE NOTE - OBJECTIVE STATEMENT
Pt received in bed 38D, 25 y/o male, A&Ox3, BIBEMS found on the side walk vomiting and stumbling around. Pt states he was here in the ED for the past three days and left AMA, no records found in the hospital system of recent admission as per EMS. Pt denies drinking today, admits to drinking a few days ago. Pt repeating himself, showing pictures as proof that he was here recently, appears to be paranoid. States no PMHx, denies allergies. Skin intact. Pt received in bed 38D, 27 y/o male, A&Ox3, BIBA for abdominal pain from shelter. Patient stated he was admitted for past 3 days for pancreatitis, signed AMA earlier because her daughter fell. As per emt, they would not let him back in the shelter without discharge papers. Pt denies drinking today, admits to drinking a few days ago. States no PMHx, denies allergies. Skin intact.

## 2022-06-04 NOTE — H&P ADULT - PROBLEM SELECTOR PLAN 2
- Alcohol level on admission as above  - Will order IV Thiamine daily   - CIWA protocol   - Ativan PRN   - Continue to monitor

## 2022-06-04 NOTE — H&P ADULT - HISTORY OF PRESENT ILLNESS
Patient is a 25 yo M with PMHx of Etoh abuse who presents with worsening ab pain.  HPI obtained from Er physician.  Patient refused to speak to me despite me introducing myself as his physician and explaining the importance of me obtaining a hx from him.  Patient nodded his head yes to me when I asked if youre going to ignore me.  As per Er physician patient was being treated for pancreatitis however left ama d/t a family emergency.  Patient did not allow me to perform a physical exam

## 2022-06-04 NOTE — H&P ADULT - PROBLEM SELECTOR PLAN 1
- Patient presents to the ER with epigastric pain ×1 day which radiates to the back; patient has a history of alcohol abuse;    - Lipase on admission was 546   - Serum alcohol on admission 239  -  ALT 80   - Will start the patient on IV fluids lactated ringer at 200 cc/h for fluid resuscitation   - Will order morphine 2 mg every 4 hours when necessary for pain control   - Will order Zofran 4 mg every 4 hours when necessary for nausea vomiting   - Will place the patient nothing by mouth; once patient pain is controlled, will attempt by mouth challenge starting with clear liquids

## 2022-06-05 VITALS
DIASTOLIC BLOOD PRESSURE: 69 MMHG | TEMPERATURE: 98 F | SYSTOLIC BLOOD PRESSURE: 111 MMHG | RESPIRATION RATE: 18 BRPM | OXYGEN SATURATION: 100 % | HEART RATE: 69 BPM

## 2022-06-05 LAB
ALBUMIN SERPL ELPH-MCNC: 3 G/DL — LOW (ref 3.3–5)
ALP SERPL-CCNC: 99 U/L — SIGNIFICANT CHANGE UP (ref 40–120)
ALT FLD-CCNC: 117 U/L — HIGH (ref 12–78)
ANION GAP SERPL CALC-SCNC: 7 MMOL/L — SIGNIFICANT CHANGE UP (ref 5–17)
AST SERPL-CCNC: 185 U/L — HIGH (ref 15–37)
BILIRUB SERPL-MCNC: 0.5 MG/DL — SIGNIFICANT CHANGE UP (ref 0.2–1.2)
BUN SERPL-MCNC: 11 MG/DL — SIGNIFICANT CHANGE UP (ref 7–23)
CALCIUM SERPL-MCNC: 8.9 MG/DL — SIGNIFICANT CHANGE UP (ref 8.5–10.1)
CHLORIDE SERPL-SCNC: 105 MMOL/L — SIGNIFICANT CHANGE UP (ref 96–108)
CHOLEST SERPL-MCNC: 153 MG/DL — SIGNIFICANT CHANGE UP
CO2 SERPL-SCNC: 28 MMOL/L — SIGNIFICANT CHANGE UP (ref 22–31)
CREAT SERPL-MCNC: 0.72 MG/DL — SIGNIFICANT CHANGE UP (ref 0.5–1.3)
EGFR: 128 ML/MIN/1.73M2 — SIGNIFICANT CHANGE UP
GLUCOSE SERPL-MCNC: 87 MG/DL — SIGNIFICANT CHANGE UP (ref 70–99)
HCT VFR BLD CALC: 33.5 % — LOW (ref 39–50)
HDLC SERPL-MCNC: 68 MG/DL — SIGNIFICANT CHANGE UP
HGB BLD-MCNC: 11.3 G/DL — LOW (ref 13–17)
LIDOCAIN IGE QN: 544 U/L — HIGH (ref 73–393)
LIPID PNL WITH DIRECT LDL SERPL: 75 MG/DL — SIGNIFICANT CHANGE UP
MAGNESIUM SERPL-MCNC: 2.1 MG/DL — SIGNIFICANT CHANGE UP (ref 1.6–2.6)
MCHC RBC-ENTMCNC: 33.7 G/DL — SIGNIFICANT CHANGE UP (ref 32–36)
MCHC RBC-ENTMCNC: 35 PG — HIGH (ref 27–34)
MCV RBC AUTO: 103.7 FL — HIGH (ref 80–100)
NON HDL CHOLESTEROL: 85 MG/DL — SIGNIFICANT CHANGE UP
NRBC # BLD: 0 /100 WBCS — SIGNIFICANT CHANGE UP (ref 0–0)
PHOSPHATE SERPL-MCNC: 2.6 MG/DL — SIGNIFICANT CHANGE UP (ref 2.5–4.5)
PLATELET # BLD AUTO: 85 K/UL — LOW (ref 150–400)
POTASSIUM SERPL-MCNC: 3.5 MMOL/L — SIGNIFICANT CHANGE UP (ref 3.5–5.3)
POTASSIUM SERPL-SCNC: 3.5 MMOL/L — SIGNIFICANT CHANGE UP (ref 3.5–5.3)
PROT SERPL-MCNC: 6.2 GM/DL — SIGNIFICANT CHANGE UP (ref 6–8.3)
RBC # BLD: 3.23 M/UL — LOW (ref 4.2–5.8)
RBC # FLD: 12.9 % — SIGNIFICANT CHANGE UP (ref 10.3–14.5)
SODIUM SERPL-SCNC: 140 MMOL/L — SIGNIFICANT CHANGE UP (ref 135–145)
TRIGL SERPL-MCNC: 52 MG/DL — SIGNIFICANT CHANGE UP
WBC # BLD: 3.32 K/UL — LOW (ref 3.8–10.5)
WBC # FLD AUTO: 3.32 K/UL — LOW (ref 3.8–10.5)

## 2022-06-05 PROCEDURE — 99239 HOSP IP/OBS DSCHRG MGMT >30: CPT

## 2022-06-05 RX ADMIN — Medication 650 MILLIGRAM(S): at 07:41

## 2022-06-05 RX ADMIN — Medication 650 MILLIGRAM(S): at 00:21

## 2022-06-05 RX ADMIN — FAMOTIDINE 20 MILLIGRAM(S): 10 INJECTION INTRAVENOUS at 07:29

## 2022-06-05 RX ADMIN — Medication 650 MILLIGRAM(S): at 08:44

## 2022-06-05 NOTE — DISCHARGE NOTE NURSING/CASE MANAGEMENT/SOCIAL WORK - PATIENT PORTAL LINK FT
You can access the FollowMyHealth Patient Portal offered by [facility] by registering at the following website: [email or web address]. By joining [facility]’s FollowMyHealth portal, you will also be able to view your health information using other applications (apps) compatible with our system.

## 2022-06-05 NOTE — DISCHARGE NOTE NURSING/CASE MANAGEMENT/SOCIAL WORK - NSDCPEFALRISK_GEN_ALL_CORE
For information on Fall & Injury Prevention, visit: [email or web address] OR  [email or web address] OR  https://www.cdc.gov/steadi/patient.html
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
## 2022-06-08 DIAGNOSIS — F10.229 ALCOHOL DEPENDENCE WITH INTOXICATION, UNSPECIFIED: ICD-10-CM

## 2022-06-08 DIAGNOSIS — F10.239 ALCOHOL DEPENDENCE WITH WITHDRAWAL, UNSPECIFIED: ICD-10-CM

## 2022-06-08 DIAGNOSIS — K92.0 HEMATEMESIS: ICD-10-CM

## 2022-06-08 DIAGNOSIS — K70.10 ALCOHOLIC HEPATITIS WITHOUT ASCITES: ICD-10-CM

## 2022-06-08 DIAGNOSIS — Z81.1 FAMILY HISTORY OF ALCOHOL ABUSE AND DEPENDENCE: ICD-10-CM

## 2022-06-08 DIAGNOSIS — R45.851 SUICIDAL IDEATIONS: ICD-10-CM

## 2022-06-08 DIAGNOSIS — D61.818 OTHER PANCYTOPENIA: ICD-10-CM

## 2022-06-08 DIAGNOSIS — E66.3 OVERWEIGHT: ICD-10-CM

## 2022-06-08 DIAGNOSIS — K20.91 ESOPHAGITIS, UNSPECIFIED WITH BLEEDING: ICD-10-CM

## 2022-06-10 DIAGNOSIS — R10.9 UNSPECIFIED ABDOMINAL PAIN: ICD-10-CM
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
## 2022-06-10 DIAGNOSIS — F10.10 ALCOHOL ABUSE, UNCOMPLICATED: ICD-10-CM

## 2022-06-10 DIAGNOSIS — K85.90 ACUTE PANCREATITIS WITHOUT NECROSIS OR INFECTION, UNSPECIFIED: ICD-10-CM

## 2022-06-11 ENCOUNTER — INPATIENT (INPATIENT)
Facility: HOSPITAL | Age: 27
LOS: 1 days | Discharge: HOME | End: 2022-06-13
Attending: STUDENT IN AN ORGANIZED HEALTH CARE EDUCATION/TRAINING PROGRAM | Admitting: INTERNAL MEDICINE
Payer: MEDICAID

## 2022-06-11 VITALS
HEIGHT: 68 IN | RESPIRATION RATE: 16 BRPM | OXYGEN SATURATION: 99 % | SYSTOLIC BLOOD PRESSURE: 139 MMHG | WEIGHT: 175.93 LBS | HEART RATE: 93 BPM | DIASTOLIC BLOOD PRESSURE: 95 MMHG | TEMPERATURE: 98 F

## 2022-06-11 DIAGNOSIS — K70.10 ALCOHOLIC HEPATITIS WITHOUT ASCITES: ICD-10-CM

## 2022-06-11 DIAGNOSIS — R10.9 UNSPECIFIED ABDOMINAL PAIN: ICD-10-CM

## 2022-06-11 DIAGNOSIS — K20.91 ESOPHAGITIS, UNSPECIFIED WITH BLEEDING: ICD-10-CM

## 2022-06-11 DIAGNOSIS — D64.9 ANEMIA, UNSPECIFIED: ICD-10-CM

## 2022-06-11 DIAGNOSIS — E87.6 HYPOKALEMIA: ICD-10-CM

## 2022-06-11 DIAGNOSIS — K22.6 GASTRO-ESOPHAGEAL LACERATION-HEMORRHAGE SYNDROME: ICD-10-CM

## 2022-06-11 DIAGNOSIS — F10.10 ALCOHOL ABUSE, UNCOMPLICATED: ICD-10-CM

## 2022-06-11 DIAGNOSIS — K29.21 ALCOHOLIC GASTRITIS WITH BLEEDING: ICD-10-CM

## 2022-06-11 PROCEDURE — 99285 EMERGENCY DEPT VISIT HI MDM: CPT

## 2022-06-11 NOTE — ED ADULT TRIAGE NOTE - CHIEF COMPLAINT QUOTE
Pt complains of alcohol withdrawal. Complains of sharp pain to stomach and vomiting blood. 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 Reports last drink was 2 days ago.

## 2022-06-12 DIAGNOSIS — K92.0 HEMATEMESIS: ICD-10-CM

## 2022-06-12 DIAGNOSIS — R10.9 UNSPECIFIED ABDOMINAL PAIN: ICD-10-CM

## 2022-06-12 DIAGNOSIS — F10.10 ALCOHOL ABUSE, UNCOMPLICATED: ICD-10-CM

## 2022-06-12 LAB
ALBUMIN SERPL ELPH-MCNC: 3.6 G/DL — SIGNIFICANT CHANGE UP (ref 3.3–5)
ALBUMIN SERPL ELPH-MCNC: 4 G/DL — SIGNIFICANT CHANGE UP (ref 3.3–5)
ALP SERPL-CCNC: 64 U/L — SIGNIFICANT CHANGE UP (ref 40–120)
ALP SERPL-CCNC: 70 U/L — SIGNIFICANT CHANGE UP (ref 40–120)
ALT FLD-CCNC: 200 U/L — HIGH (ref 12–78)
ALT FLD-CCNC: 228 U/L — HIGH (ref 12–78)
ANION GAP SERPL CALC-SCNC: 11 MMOL/L — SIGNIFICANT CHANGE UP (ref 5–17)
ANION GAP SERPL CALC-SCNC: 11 MMOL/L — SIGNIFICANT CHANGE UP (ref 5–17)
APTT BLD: 29.2 SEC — SIGNIFICANT CHANGE UP (ref 27.5–35.5)
AST SERPL-CCNC: 216 U/L — HIGH (ref 15–37)
AST SERPL-CCNC: 272 U/L — HIGH (ref 15–37)
BASOPHILS # BLD AUTO: 0.03 K/UL — SIGNIFICANT CHANGE UP (ref 0–0.2)
BASOPHILS NFR BLD AUTO: 1 % — SIGNIFICANT CHANGE UP (ref 0–2)
BILIRUB DIRECT SERPL-MCNC: 0.2 MG/DL — SIGNIFICANT CHANGE UP (ref 0–0.3)
BILIRUB INDIRECT FLD-MCNC: 0.2 MG/DL — SIGNIFICANT CHANGE UP (ref 0.2–1)
BILIRUB SERPL-MCNC: 0.4 MG/DL — SIGNIFICANT CHANGE UP (ref 0.2–1.2)
BILIRUB SERPL-MCNC: 0.5 MG/DL — SIGNIFICANT CHANGE UP (ref 0.2–1.2)
BUN SERPL-MCNC: 6 MG/DL — LOW (ref 7–23)
BUN SERPL-MCNC: 8 MG/DL — SIGNIFICANT CHANGE UP (ref 7–23)
CALCIUM SERPL-MCNC: 8.1 MG/DL — LOW (ref 8.5–10.1)
CALCIUM SERPL-MCNC: 8.8 MG/DL — SIGNIFICANT CHANGE UP (ref 8.5–10.1)
CHLORIDE SERPL-SCNC: 106 MMOL/L — SIGNIFICANT CHANGE UP (ref 96–108)
CHLORIDE SERPL-SCNC: 107 MMOL/L — SIGNIFICANT CHANGE UP (ref 96–108)
CO2 SERPL-SCNC: 24 MMOL/L — SIGNIFICANT CHANGE UP (ref 22–31)
CO2 SERPL-SCNC: 29 MMOL/L — SIGNIFICANT CHANGE UP (ref 22–31)
CREAT SERPL-MCNC: 0.9 MG/DL — SIGNIFICANT CHANGE UP (ref 0.5–1.3)
CREAT SERPL-MCNC: 0.99 MG/DL — SIGNIFICANT CHANGE UP (ref 0.5–1.3)
EGFR: 106 ML/MIN/1.73M2 — SIGNIFICANT CHANGE UP
EGFR: 119 ML/MIN/1.73M2 — SIGNIFICANT CHANGE UP
EOSINOPHIL # BLD AUTO: 0.01 K/UL — SIGNIFICANT CHANGE UP (ref 0–0.5)
EOSINOPHIL NFR BLD AUTO: 0.3 % — SIGNIFICANT CHANGE UP (ref 0–6)
FLUAV AG NPH QL: SIGNIFICANT CHANGE UP
FLUBV AG NPH QL: SIGNIFICANT CHANGE UP
GLUCOSE SERPL-MCNC: 124 MG/DL — HIGH (ref 70–99)
GLUCOSE SERPL-MCNC: 76 MG/DL — SIGNIFICANT CHANGE UP (ref 70–99)
HCT VFR BLD CALC: 35.6 % — LOW (ref 39–50)
HCT VFR BLD CALC: 37.9 % — LOW (ref 39–50)
HGB BLD-MCNC: 12 G/DL — LOW (ref 13–17)
HGB BLD-MCNC: 13 G/DL — SIGNIFICANT CHANGE UP (ref 13–17)
IMM GRANULOCYTES NFR BLD AUTO: 0.3 % — SIGNIFICANT CHANGE UP (ref 0–1.5)
INR BLD: 0.88 RATIO — SIGNIFICANT CHANGE UP (ref 0.88–1.16)
LACTATE SERPL-SCNC: 3.3 MMOL/L — HIGH (ref 0.7–2)
LACTATE SERPL-SCNC: 3.4 MMOL/L — HIGH (ref 0.7–2)
LACTATE SERPL-SCNC: 4.5 MMOL/L — CRITICAL HIGH (ref 0.7–2)
LIDOCAIN IGE QN: 156 U/L — SIGNIFICANT CHANGE UP (ref 73–393)
LYMPHOCYTES # BLD AUTO: 1.68 K/UL — SIGNIFICANT CHANGE UP (ref 1–3.3)
LYMPHOCYTES # BLD AUTO: 54.7 % — HIGH (ref 13–44)
MAGNESIUM SERPL-MCNC: 2.1 MG/DL — SIGNIFICANT CHANGE UP (ref 1.6–2.6)
MCHC RBC-ENTMCNC: 33.7 G/DL — SIGNIFICANT CHANGE UP (ref 32–36)
MCHC RBC-ENTMCNC: 34.3 G/DL — SIGNIFICANT CHANGE UP (ref 32–36)
MCHC RBC-ENTMCNC: 35.2 PG — HIGH (ref 27–34)
MCHC RBC-ENTMCNC: 35.5 PG — HIGH (ref 27–34)
MCV RBC AUTO: 103.6 FL — HIGH (ref 80–100)
MCV RBC AUTO: 104.4 FL — HIGH (ref 80–100)
MONOCYTES # BLD AUTO: 0.43 K/UL — SIGNIFICANT CHANGE UP (ref 0–0.9)
MONOCYTES NFR BLD AUTO: 14 % — SIGNIFICANT CHANGE UP (ref 2–14)
NEUTROPHILS # BLD AUTO: 0.91 K/UL — LOW (ref 1.8–7.4)
NEUTROPHILS NFR BLD AUTO: 29.7 % — LOW (ref 43–77)
NRBC # BLD: 0 /100 WBCS — SIGNIFICANT CHANGE UP (ref 0–0)
NRBC # BLD: 0 /100 WBCS — SIGNIFICANT CHANGE UP (ref 0–0)
PHOSPHATE SERPL-MCNC: 2.5 MG/DL — SIGNIFICANT CHANGE UP (ref 2.5–4.5)
PLATELET # BLD AUTO: 211 K/UL — SIGNIFICANT CHANGE UP (ref 150–400)
PLATELET # BLD AUTO: 240 K/UL — SIGNIFICANT CHANGE UP (ref 150–400)
POTASSIUM SERPL-MCNC: 3.6 MMOL/L — SIGNIFICANT CHANGE UP (ref 3.5–5.3)
POTASSIUM SERPL-MCNC: 4.4 MMOL/L — SIGNIFICANT CHANGE UP (ref 3.5–5.3)
POTASSIUM SERPL-SCNC: 3.6 MMOL/L — SIGNIFICANT CHANGE UP (ref 3.5–5.3)
POTASSIUM SERPL-SCNC: 4.4 MMOL/L — SIGNIFICANT CHANGE UP (ref 3.5–5.3)
PROT SERPL-MCNC: 7.2 GM/DL — SIGNIFICANT CHANGE UP (ref 6–8.3)
PROT SERPL-MCNC: 8.1 GM/DL — SIGNIFICANT CHANGE UP (ref 6–8.3)
PROTHROM AB SERPL-ACNC: 10.4 SEC — LOW (ref 10.5–13.4)
RBC # BLD: 3.41 M/UL — LOW (ref 4.2–5.8)
RBC # BLD: 3.66 M/UL — LOW (ref 4.2–5.8)
RBC # FLD: 13.6 % — SIGNIFICANT CHANGE UP (ref 10.3–14.5)
RBC # FLD: 13.6 % — SIGNIFICANT CHANGE UP (ref 10.3–14.5)
SARS-COV-2 RNA SPEC QL NAA+PROBE: SIGNIFICANT CHANGE UP
SODIUM SERPL-SCNC: 142 MMOL/L — SIGNIFICANT CHANGE UP (ref 135–145)
SODIUM SERPL-SCNC: 146 MMOL/L — HIGH (ref 135–145)
WBC # BLD: 2.86 K/UL — LOW (ref 3.8–10.5)
WBC # BLD: 3.07 K/UL — LOW (ref 3.8–10.5)
WBC # FLD AUTO: 2.86 K/UL — LOW (ref 3.8–10.5)
WBC # FLD AUTO: 3.07 K/UL — LOW (ref 3.8–10.5)

## 2022-06-12 PROCEDURE — 99223 1ST HOSP IP/OBS HIGH 75: CPT

## 2022-06-12 PROCEDURE — 71260 CT THORAX DX C+: CPT | Mod: 26,MA

## 2022-06-12 PROCEDURE — 76705 ECHO EXAM OF ABDOMEN: CPT | Mod: 26

## 2022-06-12 PROCEDURE — 71046 X-RAY EXAM CHEST 2 VIEWS: CPT | Mod: 26

## 2022-06-12 PROCEDURE — 74177 CT ABD & PELVIS W/CONTRAST: CPT | Mod: 26,MA

## 2022-06-12 RX ORDER — OXYCODONE HYDROCHLORIDE 5 MG/1
5 TABLET ORAL EVERY 6 HOURS
Refills: 0 | Status: DISCONTINUED | OUTPATIENT
Start: 2022-06-12 | End: 2022-06-13

## 2022-06-12 RX ORDER — ACETAMINOPHEN 500 MG
975 TABLET ORAL ONCE
Refills: 0 | Status: COMPLETED | OUTPATIENT
Start: 2022-06-12 | End: 2022-06-12

## 2022-06-12 RX ORDER — SODIUM CHLORIDE 9 MG/ML
1000 INJECTION INTRAMUSCULAR; INTRAVENOUS; SUBCUTANEOUS
Refills: 0 | Status: DISCONTINUED | OUTPATIENT
Start: 2022-06-12 | End: 2022-06-13

## 2022-06-12 RX ORDER — FOLIC ACID 0.8 MG
1 TABLET ORAL DAILY
Refills: 0 | Status: DISCONTINUED | OUTPATIENT
Start: 2022-06-12 | End: 2022-06-13

## 2022-06-12 RX ORDER — IBUPROFEN 200 MG
400 TABLET ORAL EVERY 6 HOURS
Refills: 0 | Status: DISCONTINUED | OUTPATIENT
Start: 2022-06-12 | End: 2022-06-13

## 2022-06-12 RX ORDER — PANTOPRAZOLE SODIUM 20 MG/1
80 TABLET, DELAYED RELEASE ORAL ONCE
Refills: 0 | Status: COMPLETED | OUTPATIENT
Start: 2022-06-12 | End: 2022-06-12

## 2022-06-12 RX ORDER — SODIUM CHLORIDE 9 MG/ML
1000 INJECTION INTRAMUSCULAR; INTRAVENOUS; SUBCUTANEOUS ONCE
Refills: 0 | Status: COMPLETED | OUTPATIENT
Start: 2022-06-12 | End: 2022-06-12

## 2022-06-12 RX ORDER — THIAMINE MONONITRATE (VIT B1) 100 MG
100 TABLET ORAL DAILY
Refills: 0 | Status: DISCONTINUED | OUTPATIENT
Start: 2022-06-12 | End: 2022-06-13

## 2022-06-12 RX ORDER — MORPHINE SULFATE 50 MG/1
4 CAPSULE, EXTENDED RELEASE ORAL ONCE
Refills: 0 | Status: DISCONTINUED | OUTPATIENT
Start: 2022-06-12 | End: 2022-06-12

## 2022-06-12 RX ORDER — SODIUM CHLORIDE 9 MG/ML
1000 INJECTION INTRAMUSCULAR; INTRAVENOUS; SUBCUTANEOUS
Refills: 0 | Status: DISCONTINUED | OUTPATIENT
Start: 2022-06-12 | End: 2022-06-12

## 2022-06-12 RX ORDER — PANTOPRAZOLE SODIUM 20 MG/1
40 TABLET, DELAYED RELEASE ORAL EVERY 12 HOURS
Refills: 0 | Status: DISCONTINUED | OUTPATIENT
Start: 2022-06-12 | End: 2022-06-13

## 2022-06-12 RX ORDER — CEFTRIAXONE 500 MG/1
1000 INJECTION, POWDER, FOR SOLUTION INTRAMUSCULAR; INTRAVENOUS ONCE
Refills: 0 | Status: COMPLETED | OUTPATIENT
Start: 2022-06-12 | End: 2022-06-12

## 2022-06-12 RX ORDER — ONDANSETRON 8 MG/1
8 TABLET, FILM COATED ORAL
Refills: 0 | Status: DISCONTINUED | OUTPATIENT
Start: 2022-06-12 | End: 2022-06-13

## 2022-06-12 RX ORDER — MORPHINE SULFATE 50 MG/1
1 CAPSULE, EXTENDED RELEASE ORAL EVERY 6 HOURS
Refills: 0 | Status: DISCONTINUED | OUTPATIENT
Start: 2022-06-12 | End: 2022-06-13

## 2022-06-12 RX ADMIN — PANTOPRAZOLE SODIUM 80 MILLIGRAM(S): 20 TABLET, DELAYED RELEASE ORAL at 02:35

## 2022-06-12 RX ADMIN — Medication 50 MILLIGRAM(S): at 16:23

## 2022-06-12 RX ADMIN — MORPHINE SULFATE 4 MILLIGRAM(S): 50 CAPSULE, EXTENDED RELEASE ORAL at 02:34

## 2022-06-12 RX ADMIN — Medication 975 MILLIGRAM(S): at 05:56

## 2022-06-12 RX ADMIN — SODIUM CHLORIDE 150 MILLILITER(S): 9 INJECTION INTRAMUSCULAR; INTRAVENOUS; SUBCUTANEOUS at 09:19

## 2022-06-12 RX ADMIN — ONDANSETRON 8 MILLIGRAM(S): 8 TABLET, FILM COATED ORAL at 16:23

## 2022-06-12 RX ADMIN — Medication 50 MILLIGRAM(S): at 14:12

## 2022-06-12 RX ADMIN — SODIUM CHLORIDE 1000 MILLILITER(S): 9 INJECTION INTRAMUSCULAR; INTRAVENOUS; SUBCUTANEOUS at 00:41

## 2022-06-12 RX ADMIN — Medication 100 MILLIGRAM(S): at 18:43

## 2022-06-12 RX ADMIN — Medication 1 MILLIGRAM(S): at 18:34

## 2022-06-12 RX ADMIN — Medication 50 MILLIGRAM(S): at 02:33

## 2022-06-12 RX ADMIN — Medication 50 MILLIGRAM(S): at 23:52

## 2022-06-12 RX ADMIN — Medication 1 TABLET(S): at 18:34

## 2022-06-12 RX ADMIN — MORPHINE SULFATE 1 MILLIGRAM(S): 50 CAPSULE, EXTENDED RELEASE ORAL at 12:14

## 2022-06-12 RX ADMIN — PANTOPRAZOLE SODIUM 40 MILLIGRAM(S): 20 TABLET, DELAYED RELEASE ORAL at 06:20

## 2022-06-12 RX ADMIN — SODIUM CHLORIDE 1000 MILLILITER(S): 9 INJECTION INTRAMUSCULAR; INTRAVENOUS; SUBCUTANEOUS at 05:55

## 2022-06-12 RX ADMIN — MORPHINE SULFATE 1 MILLIGRAM(S): 50 CAPSULE, EXTENDED RELEASE ORAL at 12:29

## 2022-06-12 RX ADMIN — Medication 975 MILLIGRAM(S): at 02:32

## 2022-06-12 RX ADMIN — Medication 50 MILLIGRAM(S): at 18:32

## 2022-06-12 RX ADMIN — PANTOPRAZOLE SODIUM 40 MILLIGRAM(S): 20 TABLET, DELAYED RELEASE ORAL at 18:32

## 2022-06-12 RX ADMIN — SODIUM CHLORIDE 1000 MILLILITER(S): 9 INJECTION INTRAMUSCULAR; INTRAVENOUS; SUBCUTANEOUS at 04:46

## 2022-06-12 RX ADMIN — SODIUM CHLORIDE 150 MILLILITER(S): 9 INJECTION INTRAMUSCULAR; INTRAVENOUS; SUBCUTANEOUS at 18:44

## 2022-06-12 RX ADMIN — CEFTRIAXONE 1000 MILLIGRAM(S): 500 INJECTION, POWDER, FOR SOLUTION INTRAMUSCULAR; INTRAVENOUS at 04:50

## 2022-06-12 RX ADMIN — Medication 975 MILLIGRAM(S): at 04:41

## 2022-06-12 RX ADMIN — Medication 50 MILLIGRAM(S): at 14:11

## 2022-06-12 RX ADMIN — SODIUM CHLORIDE 1000 MILLILITER(S): 9 INJECTION INTRAMUSCULAR; INTRAVENOUS; SUBCUTANEOUS at 04:50

## 2022-06-12 RX ADMIN — MORPHINE SULFATE 1 MILLIGRAM(S): 50 CAPSULE, EXTENDED RELEASE ORAL at 23:51

## 2022-06-12 RX ADMIN — CEFTRIAXONE 100 MILLIGRAM(S): 500 INJECTION, POWDER, FOR SOLUTION INTRAMUSCULAR; INTRAVENOUS at 02:38

## 2022-06-12 RX ADMIN — Medication 975 MILLIGRAM(S): at 04:56

## 2022-06-12 RX ADMIN — MORPHINE SULFATE 4 MILLIGRAM(S): 50 CAPSULE, EXTENDED RELEASE ORAL at 04:41

## 2022-06-12 NOTE — CONSULT NOTE ADULT - ASSESSMENT
HPI:    · Chief Complaint: The patient is a 26y Male complaining of abdominal pain.  · HPI Objective Statement: Attending MD Caballero : 26 M hx alcohol abuse, alcohol withdrawal p/w abdominal pain, nausea, and hematemesis. Patient was recently here for alcohol induced pancreatitis. Patient AMAd and drank two mimosas at a diner with his girlfriend. Had multiple episodes of vomiting over the last two days, and as of 4pm today began vomiting blood and clots. No change in BM, has not had BM for approximately 3 days.  ---- As Above --- patient is a poor historian  Patient was recently here with N/V and was diagnosed with pancreatitis However, he left AMA. The patient then continued to drink and came back to the ER. He states that he again developed N/V and abdominal pain. But, this time the vomitus became bloody. ( How much ?) He denies NSAIDS /  anticoagulants.  In the ER, the patient continued to have  the pain but the N/V resolved. He actually tolerated two food trays in the ER.  The patient has a history of ETOH abuse ( multiple admissions for ETOH withdrawal ) but was recently diagnosed with pancreatitis. He was told that he has liver disease (? from drinking )     A) N/V, hematemesis, abdominal pain - Patient probably with alcoholic gastritis --> Destiney Tan tear , r/o PUD - 1) Clear liquid diet 2) PPI 3) EGD when withdrawals subside 4) F/U labs  B) Elevated LFTs - OT > PT c/w alcoholic hepatitis See CT Scan HPI:    · Chief Complaint: The patient is a 26y Male complaining of abdominal pain.  · HPI Objective Statement: Attending MD Caballero : 26 M hx alcohol abuse, alcohol withdrawal p/w abdominal pain, nausea, and hematemesis. Patient was recently here for alcohol induced pancreatitis. Patient AMAd and drank two mimosas at a diner with his girlfriend. Had multiple episodes of vomiting over the last two days, and as of 4pm today began vomiting blood and clots. No change in BM, has not had BM for approximately 3 days.  ---- As Above --- patient is a poor historian  Patient was recently here with N/V and was diagnosed with pancreatitis However, he left AMA. The patient then continued to drink and came back to the ER. He states that he again developed N/V and abdominal pain. But, this time the vomitus became bloody. ( How much ?) He denies NSAIDS /  anticoagulants.  In the ER, the patient continued to have  the pain but the N/V resolved. He actually tolerated two food trays in the ER.  The patient has a history of ETOH abuse ( multiple admissions for ETOH withdrawal ) but was recently diagnosed with pancreatitis. He was told that he has liver disease (? from drinking )     A) N/V, hematemesis, abdominal pain - Patient probably with alcoholic gastritis --> Destiney Tan tear , r/o PUD - 1) Clear liquid diet 2) PPI 3) EGD when withdrawals subside 4) F/U labs  B) Elevated LFTs - OT > PT c/w alcoholic hepatitis See CT Scan / Ultrasound 1) F/U labs 2) Viral studies

## 2022-06-12 NOTE — H&P ADULT - PROBLEM SELECTOR PLAN 1
ppi q12   pain meds   ivf   check coags asap   npo   gi consult    trend hgb ppi q12   pain meds   ivf   check coags asap   npo   gi consult    trend hgb   patient adamant about eating food despite me speaking with him and explaining risks and benefits

## 2022-06-12 NOTE — ED PROVIDER NOTE - CLINICAL SUMMARY MEDICAL DECISION MAKING FREE TEXT BOX
Attending MD Caballero : Pt here with likely continued pancreatitis. Will obtain US gallbladder which was not obtained prior to r/o choledocho, labs to assess for lipase. Meds, pain control, reassess.   Hematemesis - PUD vs Destiney Tan vs [place] vs variceal bleed. Given poss variceal bleed given pt no hx of endoscopy, will rx ceftriaxone. Pain can likely be explained by worsening pancreatitis. If not, will consider advanced inmaging.

## 2022-06-12 NOTE — CONSULT NOTE ADULT - SUBJECTIVE AND OBJECTIVE BOX
HPI:    · Chief Complaint: The patient is a 26y Male complaining of abdominal pain.  · HPI Objective Statement: Attending MD Caballero : 26 M hx alcohol abuse, alcohol withdrawal p/w abdominal pain, nausea, and hematemesis. Patient was recently here for alcohol induced pancreatitis. Patient AMAd and drank two mimosas at a diner with his girlfriend. Had multiple episodes of vomiting over the last two days, and as of 4pm today began vomiting blood and clots. No change in BM, has not had BM for approximately 3 days.  ---- As Above --- patient is a poor historian  Patient was recently here with N/V and was diagnosed with pancreatitis However, he left AMA. The patient then continued to drink and came back to the ER. He states that he again developed N/V and abdominal pain. But, this time the vomitus became bloody. ( How much ?) He denies NSAIDS /  anticoagulants.  In the ER, the patient continued to have  the pain but the N/V resolved. He actually tolerated two food trays in the ER.  The patient has a history of ETOH abuse ( multiple admissions for ETOH withdrawal ) but was recently diagnosed with pancreatitis. He was told that he has liver disease (? from drinking )         (12 Jun 2022 10:45)      PAST MEDICAL & SURGICAL HISTORY:  No pertinent past medical history      ETOH abuse          MEDICATIONS  (STANDING):  chlordiazePOXIDE   Oral   chlordiazePOXIDE 50 milliGRAM(s) Oral every 6 hours  folic acid 1 milliGRAM(s) Oral daily  multivitamin 1 Tablet(s) Oral daily  pantoprazole  Injectable 40 milliGRAM(s) IV Push every 12 hours  sodium chloride 0.9%. 1000 milliLiter(s) (150 mL/Hr) IV Continuous <Continuous>  thiamine 100 milliGRAM(s) Oral daily    MEDICATIONS  (PRN):  chlordiazePOXIDE 50 milliGRAM(s) Oral every 2 hours PRN Symptom-triggered: each CIWA -Ar score 8 or GREATER  ibuprofen  Tablet. 400 milliGRAM(s) Oral every 6 hours PRN Temp greater or equal to 38C (100.4F), Mild Pain (1 - 3)  morphine  - Injectable 1 milliGRAM(s) IV Push every 6 hours PRN Severe Pain (7 - 10)  ondansetron Injectable 8 milliGRAM(s) IV Push two times a day PRN Nausea and/or Vomiting  oxyCODONE    IR 5 milliGRAM(s) Oral every 6 hours PRN Moderate Pain (4 - 6)      Allergies    No Known Drug Allergies  Tomatoes (Short breath)    Intolerances        FAMILY HISTORY:      REVIEW OF SYSTEMS:    CONSTITUTIONAL: No fever, weight loss,   EYES: No eye pain, visual disturbances, or discharge  ENMT:  No difficulty hearing, tinnitus, vertigo; No sinus or throat pain  NECK: No pain or stiffness  BREASTS: No pain, masses, or nipple discharge  RESPIRATORY: No cough, wheezing, chills or hemoptysis; No shortness of breath  CARDIOVASCULAR: No chest pain, palpitations, dizziness, or leg swelling  GASTROINTESTINAL: See above   GENITOURINARY: No dysuria, frequency, hematuria, or incontinence  NEUROLOGICAL: No headaches, memory loss, loss of strength, numbness, or tremors  SKIN: No itching, burning, rashes, or lesions   LYMPH NODES: No enlarged glands  ENDOCRINE: No heat or cold intolerance; No hair loss  MUSCULOSKELETAL: No joint pain or swelling; No muscle, back, or extremity pain  PSYCHIATRIC: No depression, anxiety, mood swings, or difficulty sleeping  HEME/LYMPH: No easy bruising, or bleeding gums  ALLERGY AND IMMUNOLOGIC: No hives or eczema          SOCIAL HISTORY:    FAMILY HISTORY:      Vital Signs Last 24 Hrs  T(C): 36.7 (12 Jun 2022 09:58), Max: 36.9 (11 Jun 2022 23:32)  T(F): 98.1 (12 Jun 2022 09:58), Max: 98.4 (11 Jun 2022 23:32)  HR: 88 (12 Jun 2022 09:58) (71 - 93)  BP: 150/95 (12 Jun 2022 09:58) (108/62 - 150/95)  BP(mean): --  RR: 18 (12 Jun 2022 09:58) (16 - 18)  SpO2: 98% (12 Jun 2022 09:58) (98% - 99%)    PHYSICAL EXAM:    GENERAL: NAD, well-groomed, well-developed  HEAD:  Atraumatic, Normocephalic  EYES: EOMI, PERRLA, conjunctiva and sclera clear  NECK: Supple, No JVD, Normal thyroid  NERVOUS SYSTEM:  Alert & Oriented X3, Good concentration; Motor Strength 5/5 B/L upper and lower extremities;   CHEST/LUNG: Clear to percussion bilaterally; No rales, rhonchi, wheezing, or rubs  HEART: Regular rate and rhythm; No murmurs, rubs, or gallops  ABDOMEN: Soft, Nontender, Nondistended; Bowel sounds present  EXTREMITIES:  2+ Peripheral Pulses, No clubbing, cyanosis, or edema  LYMPH: No lymphadenopathy noted   RECTAL:  Deferred   SKIN: No rashes or lesions    LABS:                        12.0   2.86  )-----------( 211      ( 12 Jun 2022 11:00 )             35.6       CBC:  06-12 @ 11:00  WBC  2.86  HGB 12.0  HCT 35.6 Plate 211  .4  06-12 @ 01:11  WBC  3.07  HGB 13.0  HCT 37.9 Plate 240  .6           12 Jun 2022 07:44    142    |  107    |  6      ----------------------------<  124    3.6     |  24     |  0.90   12 Jun 2022 01:11    146    |  106    |  8      ----------------------------<  76     4.4     |  29     |  0.99     Ca    8.1        12 Jun 2022 07:44  Ca    8.8        12 Jun 2022 01:11  Phos  2.5       12 Jun 2022 07:44  Mg     2.1       12 Jun 2022 07:44    TPro  7.2    /  Alb  3.6    /  TBili  0.4    /  DBili  0.2    /  AST  216    /  ALT  200    /  AlkPhos  64     12 Jun 2022 07:44  TPro  8.1    /  Alb  4.0    /  TBili  0.5    /  DBili  x      /  AST  272    /  ALT  228    /  AlkPhos  70     12 Jun 2022 01:11    PT/INR - ( 12 Jun 2022 11:00 )   PT: 10.4 sec;   INR: 0.88 ratio         PTT - ( 12 Jun 2022 11:00 )  PTT:29.2 sec        RADIOLOGY & ADDITIONAL STUDIES:  < from: CT Abdomen and Pelvis w/ IV Cont (06.12.22 @ 03:53) >    ACC: [id number] EXAM:  CT ABDOMEN AND PELVIS IC                        ACC: [id number] EXAM:  CT CHEST IC                          PROCEDURE DATE:  06/12/2022          INTERPRETATION:  CT CHEST, ABDOMEN AND PELVIS WITH CONTRAST    INDICATION: Hematemesis    TECHNIQUE: Contrast enhanced CT of the chest, abdomen and pelvis.  Images   are reformatted in the sagittal and coronal planes. Postprocessed MIP   reformatted chest images were created and reviewed.    90 mL of Omnipaque 350 contrast material was injected IV.    COMPARISON: CT angiogram abdomen dated 2/16/2021.    FINDINGS:    Thorax:  Lines and tubes: None.  Airways: Tracheobronchial tree is patent.  Lungs: No pneumothorax or hemothorax.  Mediastinum and lymph nodes: No mass or hemorrhage.No bulky adenopathy.  Heart: Normal size. No pericardial effusion.  Vessels: Normal size.  Chest Wall: Within normal limits.    Abdomen/Pelvis:  Liver: No laceration. Hepatomegaly and hepatic steatosis.  Biliary: No dilatation.  Spleen: No laceration.  Pancreas: No inflammatory changes or ductal dilatation.  Adrenals: Normal.  Kidneys: No hydronephrosis. No laceration.  Vessels: Normal caliber. Atherosclerotic disease of the aorta and its   branches.    GI tract: No evidence of small bowel obstruction. Wall thickening of the   distal esophagus suggests esophagitis.    Peritoneum/retroperitoneum and mesentery: No free air. No hemoperitoneum.    Pelvic organs/Bladder: No pelvic masses. Bladder is normal.    Abdominal wall: Unremarkable.  Bones and soft tissues: No acute displaced fractures. Multilevel   degenerative changes of the spine.    IMPRESSION:    Wall thickening of the distal esophagus suggests esophagitis.  Hepatomegaly and hepatic steatosis.    --- End of Report ---            LINO DIETZ MD; Attending Radiologist  This document has been electronically signed. Jun 12 2022  4:25AM    < end of copied text >     HPI:    · Chief Complaint: The patient is a 26y Male complaining of abdominal pain.  · HPI Objective Statement: Attending MD Caballero : 26 M hx alcohol abuse, alcohol withdrawal p/w abdominal pain, nausea, and hematemesis. Patient was recently here for alcohol induced pancreatitis. Patient AMAd and drank two mimosas at a diner with his girlfriend. Had multiple episodes of vomiting over the last two days, and as of 4pm today began vomiting blood and clots. No change in BM, has not had BM for approximately 3 days.  ---- As Above --- patient is a poor historian  Patient was recently here with N/V and was diagnosed with pancreatitis However, he left AMA. The patient then continued to drink and came back to the ER. He states that he again developed N/V and abdominal pain. But, this time the vomitus became bloody. ( How much ?) He denies NSAIDS /  anticoagulants.  In the ER, the patient continued to have  the pain but the N/V resolved. He actually tolerated two food trays in the ER.  The patient has a history of ETOH abuse ( multiple admissions for ETOH withdrawal ) but was recently diagnosed with pancreatitis. He was told that he has liver disease (? from drinking )         (12 Jun 2022 10:45)      PAST MEDICAL & SURGICAL HISTORY:  No pertinent past medical history      ETOH abuse          MEDICATIONS  (STANDING):  chlordiazePOXIDE   Oral   chlordiazePOXIDE 50 milliGRAM(s) Oral every 6 hours  folic acid 1 milliGRAM(s) Oral daily  multivitamin 1 Tablet(s) Oral daily  pantoprazole  Injectable 40 milliGRAM(s) IV Push every 12 hours  sodium chloride 0.9%. 1000 milliLiter(s) (150 mL/Hr) IV Continuous <Continuous>  thiamine 100 milliGRAM(s) Oral daily    MEDICATIONS  (PRN):  chlordiazePOXIDE 50 milliGRAM(s) Oral every 2 hours PRN Symptom-triggered: each CIWA -Ar score 8 or GREATER  ibuprofen  Tablet. 400 milliGRAM(s) Oral every 6 hours PRN Temp greater or equal to 38C (100.4F), Mild Pain (1 - 3)  morphine  - Injectable 1 milliGRAM(s) IV Push every 6 hours PRN Severe Pain (7 - 10)  ondansetron Injectable 8 milliGRAM(s) IV Push two times a day PRN Nausea and/or Vomiting  oxyCODONE    IR 5 milliGRAM(s) Oral every 6 hours PRN Moderate Pain (4 - 6)      Allergies    No Known Drug Allergies  Tomatoes (Short breath)    Intolerances        FAMILY HISTORY:      REVIEW OF SYSTEMS:    CONSTITUTIONAL: No fever, weight loss,   EYES: No eye pain, visual disturbances, or discharge  ENMT:  No difficulty hearing, tinnitus, vertigo; No sinus or throat pain  NECK: No pain or stiffness  BREASTS: No pain, masses, or nipple discharge  RESPIRATORY: No cough, wheezing, chills or hemoptysis; No shortness of breath  CARDIOVASCULAR: No chest pain, palpitations, dizziness, or leg swelling  GASTROINTESTINAL: See above   GENITOURINARY: No dysuria, frequency, hematuria, or incontinence  NEUROLOGICAL: No headaches, memory loss, loss of strength, numbness, or tremors  SKIN: No itching, burning, rashes, or lesions   LYMPH NODES: No enlarged glands  ENDOCRINE: No heat or cold intolerance; No hair loss  MUSCULOSKELETAL: No joint pain or swelling; No muscle, back, or extremity pain  PSYCHIATRIC: No depression, anxiety, mood swings, or difficulty sleeping  HEME/LYMPH: No easy bruising, or bleeding gums  ALLERGY AND IMMUNOLOGIC: No hives or eczema          SOCIAL HISTORY:    FAMILY HISTORY:      Vital Signs Last 24 Hrs  T(C): 36.7 (12 Jun 2022 09:58), Max: 36.9 (11 Jun 2022 23:32)  T(F): 98.1 (12 Jun 2022 09:58), Max: 98.4 (11 Jun 2022 23:32)  HR: 88 (12 Jun 2022 09:58) (71 - 93)  BP: 150/95 (12 Jun 2022 09:58) (108/62 - 150/95)  BP(mean): --  RR: 18 (12 Jun 2022 09:58) (16 - 18)  SpO2: 98% (12 Jun 2022 09:58) (98% - 99%)    PHYSICAL EXAM:    GENERAL: NAD, well-groomed, well-developed  HEAD:  Atraumatic, Normocephalic  EYES: EOMI, PERRLA, conjunctiva and sclera clear  NECK: Supple, No JVD, Normal thyroid  NERVOUS SYSTEM:  Alert & Oriented X3, Good concentration; Motor Strength 5/5 B/L upper and lower extremities;   CHEST/LUNG: Clear to percussion bilaterally; No rales, rhonchi, wheezing, or rubs  HEART: Regular rate and rhythm; No murmurs, rubs, or gallops  ABDOMEN: Soft, Nontender, Nondistended; Bowel sounds present  EXTREMITIES:  2+ Peripheral Pulses, No clubbing, cyanosis, or edema  LYMPH: No lymphadenopathy noted   RECTAL:  Deferred   SKIN: No rashes or lesions    LABS:                        12.0   2.86  )-----------( 211      ( 12 Jun 2022 11:00 )             35.6       CBC:  06-12 @ 11:00  WBC  2.86  HGB 12.0  HCT 35.6 Plate 211  .4  06-12 @ 01:11  WBC  3.07  HGB 13.0  HCT 37.9 Plate 240  .6           12 Jun 2022 07:44    142    |  107    |  6      ----------------------------<  124    3.6     |  24     |  0.90   12 Jun 2022 01:11    146    |  106    |  8      ----------------------------<  76     4.4     |  29     |  0.99     Ca    8.1        12 Jun 2022 07:44  Ca    8.8        12 Jun 2022 01:11  Phos  2.5       12 Jun 2022 07:44  Mg     2.1       12 Jun 2022 07:44    TPro  7.2    /  Alb  3.6    /  TBili  0.4    /  DBili  0.2    /  AST  216    /  ALT  200    /  AlkPhos  64     12 Jun 2022 07:44  TPro  8.1    /  Alb  4.0    /  TBili  0.5    /  DBili  x      /  AST  272    /  ALT  228    /  AlkPhos  70     12 Jun 2022 01:11    PT/INR - ( 12 Jun 2022 11:00 )   PT: 10.4 sec;   INR: 0.88 ratio         PTT - ( 12 Jun 2022 11:00 )  PTT:29.2 sec        RADIOLOGY & ADDITIONAL STUDIES:  < from: CT Abdomen and Pelvis w/ IV Cont (06.12.22 @ 03:53) >    ACC: [id number] EXAM:  CT ABDOMEN AND PELVIS IC                        ACC: [id number] EXAM:  CT CHEST IC                          PROCEDURE DATE:  06/12/2022          INTERPRETATION:  CT CHEST, ABDOMEN AND PELVIS WITH CONTRAST    INDICATION: Hematemesis    TECHNIQUE: Contrast enhanced CT of the chest, abdomen and pelvis.  Images   are reformatted in the sagittal and coronal planes. Postprocessed MIP   reformatted chest images were created and reviewed.    90 mL of Omnipaque 350 contrast material was injected IV.    COMPARISON: CT angiogram abdomen dated 2/16/2021.    FINDINGS:    Thorax:  Lines and tubes: None.  Airways: Tracheobronchial tree is patent.  Lungs: No pneumothorax or hemothorax.  Mediastinum and lymph nodes: No mass or hemorrhage.No bulky adenopathy.  Heart: Normal size. No pericardial effusion.  Vessels: Normal size.  Chest Wall: Within normal limits.    Abdomen/Pelvis:  Liver: No laceration. Hepatomegaly and hepatic steatosis.  Biliary: No dilatation.  Spleen: No laceration.  Pancreas: No inflammatory changes or ductal dilatation.  Adrenals: Normal.  Kidneys: No hydronephrosis. No laceration.  Vessels: Normal caliber. Atherosclerotic disease of the aorta and its   branches.    GI tract: No evidence of small bowel obstruction. Wall thickening of the   distal esophagus suggests esophagitis.    Peritoneum/retroperitoneum and mesentery: No free air. No hemoperitoneum.    Pelvic organs/Bladder: No pelvic masses. Bladder is normal.    Abdominal wall: Unremarkable.  Bones and soft tissues: No acute displaced fractures. Multilevel   degenerative changes of the spine.    IMPRESSION:    Wall thickening of the distal esophagus suggests esophagitis.  Hepatomegaly and hepatic steatosis.    --- End of Report ---            LINO DIETZ MD; Attending Radiologist  This document has been electronically signed. Jun 12 2022  4:25AM    < end of copied text >      < from: US Abdomen Upper Quadrant Right (06.12.22 @ 01:41) >    ACC: [id number] EXAM:  US ABDOMEN RT UPR QUADRANT                          PROCEDURE DATE:  06/12/2022          INTERPRETATION:  CLINICAL INFORMATION: Abdominal pain.  Nausea.    Hematemesis.  History of alcohol abuse.    COMPARISON: None available.    TECHNIQUE: Complete abdominal ultrasound was performed.    FINDINGS:  Liver: 17.5 cm.  Hepatic steatosis.  Bile ducts: Normal caliber. Common bile duct measures 4 mm.  Gallbladder: Within normal limits.  Sonographic Heller's and was negative.  Pancreas: Visualized portions are within normal limits.  Spleen: 8.8 cm.  Within normal limits.  Right kidney: 10.4 cm. No hydronephrosis.  Left kidney: 9.3 cm.  No hydronephrosis  Ascites: None.  IVC: Visualized portions are within normal limits.    IMPRESSION:  No sonographic evidence of cholelithiasis or acute cholecystitis.    Borderline hepatomegaly.  Hepatic steatosis.    --- End of Report ---            CULLEN BARAKAT MD; Attending Radiologist  This document has been electronically signed. Jun 12 2022  2:49AM    < end of copied text >

## 2022-06-12 NOTE — H&P ADULT - NSHPLABSRESULTS_GEN_ALL_CORE
13.0   3.07  )-----------( 240      ( 12 Jun 2022 01:11 )             37.9       06-12    142  |  107  |  6<L>  ----------------------------<  124<H>  3.6   |  24  |  0.90    Ca    8.1<L>      12 Jun 2022 07:44  Phos  2.5     06-12  Mg     2.1     06-12    TPro  7.2  /  Alb  3.6  /  TBili  0.4  /  DBili  0.2  /  AST  216<H>  /  ALT  200<H>  /  AlkPhos  64  06-12      < from: CT Abdomen and Pelvis w/ IV Cont (06.12.22 @ 03:53) >    IMPRESSION:    Wall thickening of the distal esophagus suggests esophagitis.  Hepatomegaly and hepatic steatosis.    < end of copied text >

## 2022-06-12 NOTE — ED PROVIDER NOTE - OBJECTIVE STATEMENT
Attending MD Caballero : 26 M hx alcohol abuse, alcohol withdrawal p/w abdominal pain, nausea, and hematemesis. Patient was recently here for alcohol induced pancreatitis. Patient AMAd and drank two mimosas at a diner with his girlfriend. Had multiple episodes of vomiting over the last two days, and as of 4pm today began vomiting blood and clots. No change in BM, has not had BM for approximately 3 days.

## 2022-06-12 NOTE — H&P ADULT - HISTORY OF PRESENT ILLNESS
· Chief Complaint: The patient is a 26y Male complaining of abdominal pain.  · HPI Objective Statement: Attending MD Caballero : 26 M hx alcohol abuse, alcohol withdrawal p/w abdominal pain, nausea, and hematemesis. Patient was recently here for alcohol induced pancreatitis. Patient AMAd and drank two mimosas at a diner with his girlfriend. Had multiple episodes of vomiting over the last two days, and as of 4pm today began vomiting blood and clots. No change in BM, has not had BM for approximately 3 days.

## 2022-06-12 NOTE — H&P ADULT - ASSESSMENT
26 M hx alcohol abuse, alcohol withdrawal p/w abdominal pain, nausea, and hematemesis. Patient was recently here for alcohol induced pancreatitis. Patient AMAd and drank two mimosas at a diner with his girlfriend. Had multiple episodes of vomiting over the last two days, and as of 4pm today began vomiting blood and clots. No change in BM, has not had BM for approximately 3 days.

## 2022-06-12 NOTE — H&P ADULT - PROBLEM SELECTOR PLAN 3
ciwa bishop comer he states better than ativan for him bishop comer he states better than ativan for him   consumes one liter a day last drink on friday

## 2022-06-12 NOTE — ED ADULT NURSE NOTE - NSIMPLEMENTINTERV_GEN_ALL_ED
Implemented All Fall Risk Interventions:  [place] to call system. Call bell, personal items and telephone within reach. Instruct patient to call for assistance. Room bathroom lighting operational. Non-slip footwear when patient is off stretcher. Physically safe environment: no spills, clutter or unnecessary equipment. Stretcher in lowest position, wheels locked, appropriate side rails in place. Provide visual cue, wrist band, yellow gown, etc. Monitor gait and stability. Monitor for mental status changes and reorient to person, place, and time. Review medications for side effects contributing to fall risk. Reinforce activity limits and safety measures with patient and family.

## 2022-06-12 NOTE — ED PROVIDER NOTE - PHYSICAL EXAMINATION
Attending MD Caballero :   PHYSICAL EXAM:    GENERAL: uncomfortable.   HEENT:  Atraumatic  CHEST/LUNG: Chest rise equal bilaterally. CTAB. No crunch auscultated.   HEART: Regular rate and rhythm.   ABDOMEN: Generalized TTP, worse in the upper abdomen.   EXTREMITIES:  Extremities warm  PSYCH: A&Ox3  SKIN: No obvious rashes or lesions

## 2022-06-12 NOTE — ED ADULT NURSE NOTE - ED STAT RN HANDOFF DETAILS
Report endorsed to oncoming ED Hold RN Safia. Pt is stable and resting comfortably in bed. No signs and symptoms of acute distress noted at this time. Safety measures maintained. IV sites checked and patent. Any issues endorsed to oncoming RN for follow up.

## 2022-06-12 NOTE — ED ADULT NURSE NOTE - OBJECTIVE STATEMENT
Pt a&ox3, complains of alcohol withdrawal and abdominal pain. Complains of sharp pain to stomach and vomiting blood. Bloody emesis noted in ED. Pt states he has a history of alcohol abuse, sickle cell, cirrhosis and pancreatitis. Pt states he drinks a liter of alcohol each time, reports last drink was 2 days ago. Denies chest pain, sob, fever, cough, chills. Skin intact.

## 2022-06-12 NOTE — H&P ADULT - NSHPPHYSICALEXAM_GEN_ALL_CORE
Vital Signs Last 24 Hrs  T(C): 36.7 (12 Jun 2022 09:58), Max: 36.9 (11 Jun 2022 23:32)  T(F): 98.1 (12 Jun 2022 09:58), Max: 98.4 (11 Jun 2022 23:32)  HR: 88 (12 Jun 2022 09:58) (71 - 93)  BP: 150/95 (12 Jun 2022 09:58) (108/62 - 150/95)  BP(mean): --  RR: 18 (12 Jun 2022 09:58) (16 - 18)  SpO2: 98% (12 Jun 2022 09:58) (98% - 99%)    GENERAL: NAD, well-groomed, well-developed  HEAD:  Atraumatic, Normocephalic  EYES: EOMI, PERRLA, conjunctiva and sclera clear  ENMT: No tonsillar erythema, exudates, or enlargement; Moist mucous membranes, Good dentition, No lesions  NECK: Supple, No JVD, Normal thyroid  NERVOUS SYSTEM:  Alert & Oriented X3, Good concentration; Motor Strength 5/5 B/L upper and lower extremities; DTRs 2+ intact and symmetric  CHEST/LUNG: Clear to percussion bilaterally; No rales, rhonchi, wheezing, or rubs  HEART: Regular rate and rhythm; No murmurs, rubs, or gallops  ABDOMEN: Soft, Nontender, Nondistended; Bowel sounds present  EXTREMITIES:  2+ Peripheral Pulses, No clubbing, cyanosis, or edema  SKIN: No rashes or lesions Vital Signs Last 24 Hrs  T(C): 36.7 (12 Jun 2022 09:58), Max: 36.9 (11 Jun 2022 23:32)  T(F): 98.1 (12 Jun 2022 09:58), Max: 98.4 (11 Jun 2022 23:32)  HR: 88 (12 Jun 2022 09:58) (71 - 93)  BP: 150/95 (12 Jun 2022 09:58) (108/62 - 150/95)  BP(mean): --  RR: 18 (12 Jun 2022 09:58) (16 - 18)  SpO2: 98% (12 Jun 2022 09:58) (98% - 99%)    GENERAL: NAD, well-groomed, well-developed  HEAD:  Atraumatic, Normocephalic  EYES: EOMI, PERRLA, conjunctiva and sclera clear  ENMT: No tonsillar erythema, exudates, or enlargement; Moist mucous membranes, Good dentition, No lesions  NECK: Supple, No JVD, Normal thyroid  NERVOUS SYSTEM:  Alert & Oriented X3, Good concentration; Motor Strength 5/5 B/L upper and lower extremities; DTRs 2+ intact and symmetric  CHEST/LUNG: Clear to percussion bilaterally; No rales, rhonchi, wheezing, or rubs  HEART: Regular rate and rhythm; No murmurs, rubs, or gallops  ABDOMEN: Soft, epigastric pain, Nondistended; Bowel sounds present  EXTREMITIES:  2+ Peripheral Pulses, No clubbing, cyanosis, or edema  SKIN: No rashes or lesions

## 2022-06-12 NOTE — PATIENT PROFILE ADULT - FALL HARM RISK - UNIVERSAL INTERVENTIONS
Bed in lowest position, wheels locked, appropriate side rails in place/Call bell, personal items and telephone in reach/Instruct patient to call for assistance before getting out of bed or chair/Non-slip footwear when patient is out of bed/[place] to call system/Physically safe environment - no spills, clutter or unnecessary equipment/Purposeful Proactive Rounding/Room/bathroom lighting operational, light cord in reach

## 2022-06-12 NOTE — ED PROVIDER NOTE - PROGRESS NOTE DETAILS
Interestingly, patient's Hgb actually increased (hemoconcentration?). Lipase wnl now. given patient without pancreatitis, no clear explanation for why abd pain worsening. Will obtain CTAP to assess for pseudocyst formation, abscess vs alternative cause of abd pain. CT chest to eval for B[place] given patient having multiple episodes of vomiting that are now bloody with worsening pain. Likely TBA.

## 2022-06-12 NOTE — ED PROVIDER NOTE - CARE PLAN
1 Principal Discharge DX:	Hematemesis  Secondary Diagnosis:	Esophagitis  Secondary Diagnosis:	Abdominal pain

## 2022-06-13 VITALS
OXYGEN SATURATION: 99 % | HEART RATE: 74 BPM | SYSTOLIC BLOOD PRESSURE: 125 MMHG | TEMPERATURE: 99 F | RESPIRATION RATE: 16 BRPM | DIASTOLIC BLOOD PRESSURE: 78 MMHG

## 2022-06-13 DIAGNOSIS — E87.6 HYPOKALEMIA: ICD-10-CM

## 2022-06-13 DIAGNOSIS — I10 ESSENTIAL (PRIMARY) HYPERTENSION: ICD-10-CM

## 2022-06-13 DIAGNOSIS — D53.9 NUTRITIONAL ANEMIA, UNSPECIFIED: ICD-10-CM

## 2022-06-13 LAB
ALBUMIN SERPL ELPH-MCNC: 3.3 G/DL — SIGNIFICANT CHANGE UP (ref 3.3–5)
ALP SERPL-CCNC: 75 U/L — SIGNIFICANT CHANGE UP (ref 40–120)
ALT FLD-CCNC: 144 U/L — HIGH (ref 12–78)
ANION GAP SERPL CALC-SCNC: 8 MMOL/L — SIGNIFICANT CHANGE UP (ref 5–17)
AST SERPL-CCNC: 121 U/L — HIGH (ref 15–37)
BILIRUB DIRECT SERPL-MCNC: 0.3 MG/DL — SIGNIFICANT CHANGE UP (ref 0–0.3)
BILIRUB INDIRECT FLD-MCNC: 0.7 MG/DL — SIGNIFICANT CHANGE UP (ref 0.2–1)
BILIRUB SERPL-MCNC: 1 MG/DL — SIGNIFICANT CHANGE UP (ref 0.2–1.2)
BUN SERPL-MCNC: 5 MG/DL — LOW (ref 7–23)
CALCIUM SERPL-MCNC: 9.4 MG/DL — SIGNIFICANT CHANGE UP (ref 8.5–10.1)
CHLORIDE SERPL-SCNC: 103 MMOL/L — SIGNIFICANT CHANGE UP (ref 96–108)
CO2 SERPL-SCNC: 30 MMOL/L — SIGNIFICANT CHANGE UP (ref 22–31)
CREAT SERPL-MCNC: 0.8 MG/DL — SIGNIFICANT CHANGE UP (ref 0.5–1.3)
EGFR: 124 ML/MIN/1.73M2 — SIGNIFICANT CHANGE UP
FOLATE SERPL-MCNC: 10.5 NG/ML — SIGNIFICANT CHANGE UP
GLUCOSE SERPL-MCNC: 88 MG/DL — SIGNIFICANT CHANGE UP (ref 70–99)
HAV IGM SER-ACNC: SIGNIFICANT CHANGE UP
HBV CORE IGM SER-ACNC: SIGNIFICANT CHANGE UP
HBV SURFACE AG SER-ACNC: SIGNIFICANT CHANGE UP
HCT VFR BLD CALC: 35.1 % — LOW (ref 39–50)
HCV AB S/CO SERPL IA: 0.12 S/CO — SIGNIFICANT CHANGE UP (ref 0–0.99)
HCV AB SERPL-IMP: SIGNIFICANT CHANGE UP
HGB BLD-MCNC: 11.7 G/DL — LOW (ref 13–17)
LACTATE SERPL-SCNC: 0.8 MMOL/L — SIGNIFICANT CHANGE UP (ref 0.7–2)
LIDOCAIN IGE QN: 322 U/L — SIGNIFICANT CHANGE UP (ref 73–393)
MAGNESIUM SERPL-MCNC: 1.9 MG/DL — SIGNIFICANT CHANGE UP (ref 1.6–2.6)
MCHC RBC-ENTMCNC: 33.3 G/DL — SIGNIFICANT CHANGE UP (ref 32–36)
MCHC RBC-ENTMCNC: 35 PG — HIGH (ref 27–34)
MCV RBC AUTO: 105.1 FL — HIGH (ref 80–100)
NRBC # BLD: 0 /100 WBCS — SIGNIFICANT CHANGE UP (ref 0–0)
PHOSPHATE SERPL-MCNC: 3.3 MG/DL — SIGNIFICANT CHANGE UP (ref 2.5–4.5)
PLATELET # BLD AUTO: 193 K/UL — SIGNIFICANT CHANGE UP (ref 150–400)
POTASSIUM SERPL-MCNC: 3.2 MMOL/L — LOW (ref 3.5–5.3)
POTASSIUM SERPL-SCNC: 3.2 MMOL/L — LOW (ref 3.5–5.3)
PROT SERPL-MCNC: 6.6 GM/DL — SIGNIFICANT CHANGE UP (ref 6–8.3)
RBC # BLD: 3.34 M/UL — LOW (ref 4.2–5.8)
RBC # FLD: 13.2 % — SIGNIFICANT CHANGE UP (ref 10.3–14.5)
SODIUM SERPL-SCNC: 141 MMOL/L — SIGNIFICANT CHANGE UP (ref 135–145)
VIT B12 SERPL-MCNC: 429 PG/ML — SIGNIFICANT CHANGE UP (ref 232–1245)
WBC # BLD: 3.32 K/UL — LOW (ref 3.8–10.5)
WBC # FLD AUTO: 3.32 K/UL — LOW (ref 3.8–10.5)

## 2022-06-13 PROCEDURE — 99239 HOSP IP/OBS DSCHRG MGMT >30: CPT

## 2022-06-13 RX ORDER — DEXTROSE MONOHYDRATE, SODIUM CHLORIDE, AND POTASSIUM CHLORIDE 50; .745; 4.5 G/1000ML; G/1000ML; G/1000ML
1000 INJECTION, SOLUTION INTRAVENOUS
Refills: 0 | Status: DISCONTINUED | OUTPATIENT
Start: 2022-06-13 | End: 2022-06-13

## 2022-06-13 RX ORDER — THIAMINE MONONITRATE (VIT B1) 100 MG
1 TABLET ORAL
Qty: 0 | Refills: 0 | DISCHARGE
Start: 2022-06-13

## 2022-06-13 RX ORDER — DEXTROSE MONOHYDRATE, SODIUM CHLORIDE, AND POTASSIUM CHLORIDE 50; .745; 4.5 G/1000ML; G/1000ML; G/1000ML
1000 INJECTION, SOLUTION INTRAVENOUS
Refills: 0 | Status: DISCONTINUED | OUTPATIENT
Start: 2022-06-14 | End: 2022-06-13

## 2022-06-13 RX ORDER — POTASSIUM CHLORIDE 20 MEQ
40 PACKET (EA) ORAL ONCE
Refills: 0 | Status: COMPLETED | OUTPATIENT
Start: 2022-06-13 | End: 2022-06-13

## 2022-06-13 RX ADMIN — PANTOPRAZOLE SODIUM 40 MILLIGRAM(S): 20 TABLET, DELAYED RELEASE ORAL at 18:44

## 2022-06-13 RX ADMIN — Medication 1 MILLIGRAM(S): at 18:49

## 2022-06-13 RX ADMIN — Medication 1 TABLET(S): at 18:49

## 2022-06-13 RX ADMIN — Medication 40 MILLIEQUIVALENT(S): at 12:33

## 2022-06-13 RX ADMIN — SODIUM CHLORIDE 150 MILLILITER(S): 9 INJECTION INTRAMUSCULAR; INTRAVENOUS; SUBCUTANEOUS at 00:30

## 2022-06-13 RX ADMIN — Medication 50 MILLIGRAM(S): at 18:50

## 2022-06-13 RX ADMIN — Medication 400 MILLIGRAM(S): at 01:55

## 2022-06-13 RX ADMIN — DEXTROSE MONOHYDRATE, SODIUM CHLORIDE, AND POTASSIUM CHLORIDE 100 MILLILITER(S): 50; .745; 4.5 INJECTION, SOLUTION INTRAVENOUS at 18:44

## 2022-06-13 RX ADMIN — Medication 400 MILLIGRAM(S): at 02:55

## 2022-06-13 RX ADMIN — Medication 50 MILLIGRAM(S): at 06:16

## 2022-06-13 RX ADMIN — PANTOPRAZOLE SODIUM 40 MILLIGRAM(S): 20 TABLET, DELAYED RELEASE ORAL at 06:16

## 2022-06-13 RX ADMIN — MORPHINE SULFATE 1 MILLIGRAM(S): 50 CAPSULE, EXTENDED RELEASE ORAL at 00:06

## 2022-06-13 RX ADMIN — Medication 50 MILLIGRAM(S): at 13:47

## 2022-06-13 RX ADMIN — Medication 100 MILLIGRAM(S): at 18:49

## 2022-06-13 NOTE — PROGRESS NOTE ADULT - PROBLEM SELECTOR PLAN 1
ppi q12   pain meds   ivf   check coags asap   npo   gi consult    trend hgb   patient adamant about eating food despite me speaking with him and explaining risks and benefits  6/13/2022 cbc stable, no further bleeding . appreciate GI consult    coags noted wnl

## 2022-06-13 NOTE — PROGRESS NOTE ADULT - PROBLEM SELECTOR PLAN 3
bishop comer he states better than ativan for him   consumes one liter a day last drink on friday 6/13/20222 bishop jung [facility] librium he states better than ativan for him   consumes one liter a day last drink on friday 6/13/20222 bishop protocol, f/u hepatitis panel

## 2022-06-13 NOTE — CHART NOTE - NSCHARTNOTEFT_GEN_A_CORE
Called by RN stating that patient is requesting to be discharged.  Patient is scheduled for EGD in am but now states that he does not want to do it.  Patient denies abdominal pain or further vomiting.  Tolerating po diet.  H/H stable.  Dr Omer present at bedside and agrees for discharge.

## 2022-06-13 NOTE — DISCHARGE NOTE NURSING/CASE MANAGEMENT/SOCIAL WORK - PATIENT PORTAL LINK FT
You can access the FollowMyHealth Patient Portal offered by [facility] by registering at the following website: [email or web address]. By joining [employer]’s FollowMyHealth portal, you will also be able to view your health information using other applications (apps) compatible with our system.

## 2022-06-13 NOTE — PROGRESS NOTE ADULT - SUBJECTIVE AND OBJECTIVE BOX
Patient is a 26y old  Male who presents with a chief complaint of vomiting blood (13 Jun 2022 09:48)      HPI:    · Chief Complaint: The patient is a 26y Male complaining of abdominal pain.  · HPI Objective Statement: Attending MD Caballero : 26 M hx alcohol abuse, alcohol withdrawal p/w abdominal pain, nausea, and hematemesis. Patient was recently here for alcohol induced pancreatitis. Patient AMAd and drank two mimosas at a diner with his girlfriend. Had multiple episodes of vomiting over the last two days, and as of 4pm today began vomiting blood and clots. No change in BM, has not had BM for approximately 3 days.        (12 Jun 2022 10:45)      INTERVAL HPI/OVERNIGHT EVENTS:  Abdominal pain is significantly better. The patient denies melena, hematochezia, hematemesis, nausea, vomiting,  constipation, diarrhea, or change in bowel movements Tolerating solid diet    MEDICATIONS  (STANDING):  chlordiazePOXIDE   Oral   chlordiazePOXIDE 50 milliGRAM(s) Oral every 8 hours  folic acid 1 milliGRAM(s) Oral daily  multivitamin 1 Tablet(s) Oral daily  pantoprazole  Injectable 40 milliGRAM(s) IV Push every 12 hours  sodium chloride 0.9%. 1000 milliLiter(s) (150 mL/Hr) IV Continuous <Continuous>  thiamine 100 milliGRAM(s) Oral daily    MEDICATIONS  (PRN):  chlordiazePOXIDE 50 milliGRAM(s) Oral every 2 hours PRN Symptom-triggered: each CIWA -Ar score 8 or GREATER  ibuprofen  Tablet. 400 milliGRAM(s) Oral every 6 hours PRN Temp greater or equal to 38C (100.4F), Mild Pain (1 - 3)  morphine  - Injectable 1 milliGRAM(s) IV Push every 6 hours PRN Severe Pain (7 - 10)  ondansetron Injectable 8 milliGRAM(s) IV Push two times a day PRN Nausea and/or Vomiting  oxyCODONE    IR 5 milliGRAM(s) Oral every 6 hours PRN Moderate Pain (4 - 6)      FAMILY HISTORY:      Allergies    No Known Drug Allergies  Tomatoes (Short breath)    Intolerances        PMH/PSH:  No pertinent past medical history    ETOH abuse          REVIEW OF SYSTEMS:  CONSTITUTIONAL: No fever, weight loss,   EYES: No eye pain, visual disturbances, or discharge  ENMT:  No difficulty hearing, tinnitus, vertigo; No sinus or throat pain  NECK: No pain or stiffness  BREASTS: No pain, masses, or nipple discharge  RESPIRATORY: No cough, wheezing, chills or hemoptysis; No shortness of breath  CARDIOVASCULAR: No chest pain, palpitations, dizziness, or leg swelling  GASTROINTESTINAL: See above   GENITOURINARY: No dysuria, frequency, hematuria, or incontinence  NEUROLOGICAL: No headaches, memory loss, loss of strength, numbness, or tremors  SKIN: No itching, burning, rashes, or lesions   LYMPH NODES: No enlarged glands  ENDOCRINE: No heat or cold intolerance; No hair loss  MUSCULOSKELETAL: No joint pain or swelling; No muscle, back, or extremity pain  PSYCHIATRIC: No depression, anxiety, mood swings, or difficulty sleeping  HEME/LYMPH: No easy bruising, or bleeding gums  ALLERGY AND IMMUNOLOGIC: No hives or eczema    Vital Signs Last 24 Hrs  T(C): 36.7 (13 Jun 2022 12:36), Max: 37.1 (12 Jun 2022 23:44)  T(F): 98 (13 Jun 2022 12:36), Max: 98.7 (12 Jun 2022 23:44)  HR: 53 (13 Jun 2022 12:36) (53 - 79)  BP: 146/88 (13 Jun 2022 12:36) (111/73 - 146/88)  BP(mean): --  RR: 20 (13 Jun 2022 12:36) (17 - 20)  SpO2: 100% (13 Jun 2022 12:36) (96% - 100%)    PHYSICAL EXAM:  GENERAL: NAD, well-groomed, well-developed  HEAD:  Atraumatic, Normocephalic  EYES: EOMI, PERRLA, conjunctiva and sclera clear  NECK: Supple, No JVD, Normal thyroid  NERVOUS SYSTEM:  Alert & Oriented X3, Good concentration; Motor Strength 5/5 B/L upper and lower extremities;  CHEST/LUNG: Clear to percussion bilaterally; No rales, rhonchi, wheezing, or rubs  HEART: Regular rate and rhythm; No murmurs, rubs, or gallops  ABDOMEN: Soft, Nontender, Nondistended; Bowel sounds present  EXTREMITIES:  2+ Peripheral Pulses, No clubbing, cyanosis, or edema  LYMPH: No lymphadenopathy noted  SKIN: No rashes or lesions    LAB  06-13 @ 07:20  amylase --   lipase 322   06-12 @ 01:11  amylase --   lipase 156                           11.7   3.32  )-----------( 193      ( 13 Jun 2022 07:20 )             35.1       CBC:  06-13 @ 07:20  WBC 3.32   Hgb 11.7   Hct 35.1   Plts 193  .1  06-12 @ 11:00  WBC 2.86   Hgb 12.0   Hct 35.6   Plts 211  .4  06-12 @ 01:11  WBC 3.07   Hgb 13.0   Hct 37.9   Plts 240  .6      Chemistry:  06-13 @ 07:20  Na+ 141  K+ 3.2  Cl- 103  CO2 30  BUN 5  Cr 0.80     06-12 @ 07:44  Na+ 142  K+ 3.6  Cl- 107  CO2 24  BUN 6  Cr 0.90     06-12 @ 01:11  Na+ 146  K+ 4.4  Cl- 106  CO2 29  BUN 8  Cr 0.99         Glucose, Serum: 88 mg/dL (06-13 @ 07:20)  Glucose, Serum: 124 mg/dL (06-12 @ 07:44)  Glucose, Serum: 76 mg/dL (06-12 @ 01:11)      13 Jun 2022 07:20    141    |  103    |  5      ----------------------------<  88     3.2     |  30     |  0.80   12 Jun 2022 07:44    142    |  107    |  6      ----------------------------<  124    3.6     |  24     |  0.90   12 Jun 2022 01:11    146    |  106    |  8      ----------------------------<  76     4.4     |  29     |  0.99     Ca    9.4        13 Jun 2022 07:20  Ca    8.1        12 Jun 2022 07:44  Ca    8.8        12 Jun 2022 01:11  Phos  3.3       13 Jun 2022 07:20  Phos  2.5       12 Jun 2022 07:44  Mg     1.9       13 Jun 2022 07:20  Mg     2.1       12 Jun 2022 07:44    TPro  6.6    /  Alb  3.3    /  TBili  1.0    /  DBili  0.3    /  AST  121    /  ALT  144    /  AlkPhos  75     13 Jun 2022 07:20  TPro  7.2    /  Alb  3.6    /  TBili  0.4    /  DBili  0.2    /  AST  216    /  ALT  200    /  AlkPhos  64     12 Jun 2022 07:44  TPro  8.1    /  Alb  4.0    /  TBili  0.5    /  DBili  x      /  AST  272    /  ALT  228    /  AlkPhos  70     12 Jun 2022 01:11      PT/INR - ( 12 Jun 2022 11:00 )   PT: 10.4 sec;   INR: 0.88 ratio         PTT - ( 12 Jun 2022 11:00 )  PTT:29.2 sec        CAPILLARY BLOOD GLUCOSE              RADIOLOGY & ADDITIONAL TESTS:    Imaging Personally Reviewed:  [ ] YES  [ ] NO    Consultant(s) Notes Reviewed:  [ ] YES  [ ] NO    Care Discussed with Consultants/Other Providers [ ] YES  [ ] NO
Patient is a 26y old  Male who presents with a chief complaint of vomiting blood (12 Jun 2022 13:45)      OVERNIGHT EVENTS:      REVIEW OF SYSTEMS: denies chest pain/SOB, diaphoresis, no F/C, cough, dizziness, headache, blurry vision, nausea, vomiting, abdominal pain. Rest unremarkable     MEDICATIONS  (STANDING):  chlordiazePOXIDE   Oral   chlordiazePOXIDE 50 milliGRAM(s) Oral every 8 hours  folic acid 1 milliGRAM(s) Oral daily  multivitamin 1 Tablet(s) Oral daily  pantoprazole  Injectable 40 milliGRAM(s) IV Push every 12 hours  sodium chloride 0.9%. 1000 milliLiter(s) (150 mL/Hr) IV Continuous <Continuous>  thiamine 100 milliGRAM(s) Oral daily    MEDICATIONS  (PRN):  chlordiazePOXIDE 50 milliGRAM(s) Oral every 2 hours PRN Symptom-triggered: each CIWA -Ar score 8 or GREATER  ibuprofen  Tablet. 400 milliGRAM(s) Oral every 6 hours PRN Temp greater or equal to 38C (100.4F), Mild Pain (1 - 3)  morphine  - Injectable 1 milliGRAM(s) IV Push every 6 hours PRN Severe Pain (7 - 10)  ondansetron Injectable 8 milliGRAM(s) IV Push two times a day PRN Nausea and/or Vomiting  oxyCODONE    IR 5 milliGRAM(s) Oral every 6 hours PRN Moderate Pain (4 - 6)      Allergies    No Known Drug Allergies  Tomatoes (Short breath)    Intolerances        SUBJECTIVE: in bed in NAD, no acute events overnight     T(F): 97.5 (06-13-22 @ 05:11), Max: 98.7 (06-12-22 @ 23:44)  HR: 55 (06-13-22 @ 05:11) (55 - 88)  BP: 119/81 (06-13-22 @ 05:11) (111/73 - 150/95)  RR: 18 (06-13-22 @ 05:11) (17 - 18)  SpO2: 98% (06-13-22 @ 05:11) (96% - 98%)  Wt(kg): --    PHYSICAL EXAM:  GENERAL: NAD, well-groomed, well-developed  HEAD:  Atraumatic, Normocephalic  EYES: EOMI, PERRLA, conjunctiva and sclera clear  ENMT: No tonsillar erythema, exudates, or enlargement; Moist mucous membranes, Good dentition, No lesions  NECK: Supple,   CHEST/LUNG: Clear to  auscultation bilaterally; No rales, rhonchi, wheezing, or rubs  bilaterally  HEART: Regular rate and rhythm; No murmurs, rubs, or gallops  ABDOMEN: Soft, Nontender, Nondistended; Bowel sounds present  EXTREMITIES:  2+ Peripheral Pulses, No clubbing, cyanosis, or edema BL LE  SKIN: No rashes or lesions  NERVOUS SYSTEM:  Alert & Oriented X3, Good concentration; Motor Strength 5/5 B/L upper and lower extremities;   DTRs 2+ intact and symmetric, sensation intact BL    LABS:                        11.7   3.32  )-----------( 193      ( 13 Jun 2022 07:20 )             35.1     06-13    141  |  103  |  5<L>  ----------------------------<  88  3.2<L>   |  30  |  0.80    Ca    9.4      13 Jun 2022 07:20  Phos  3.3     06-13  Mg     1.9     06-13    TPro  6.6  /  Alb  3.3  /  TBili  1.0  /  DBili  0.3  /  AST  121<H>  /  ALT  144<H>  /  AlkPhos  75  06-13    PT/INR - ( 12 Jun 2022 11:00 )   PT: 10.4 sec;   INR: 0.88 ratio         PTT - ( 12 Jun 2022 11:00 )  PTT:29.2 sec    Cultures;   CAPILLARY BLOOD GLUCOSE        Lipid panel:           RADIOLOGY & ADDITIONAL TESTS:      Imaging Personally Reviewed:  [ x] YES      Consultant(s) Notes Reviewed:  [x ] YES     Care Discussed with [x ] Consultants [X ] Patient [x ] Family  [x ]    [x ]  Other; RN

## 2022-06-13 NOTE — DISCHARGE NOTE PROVIDER - HOSPITAL COURSE
27 y/o M with PMhx of alcohol abuse/ alcohol withdrawal presented with abdominal pain, nausea, and hematemesis. Patient was recently here for alcohol induced pancreatitis. Patient left AMA and states he drank two mimosas at a diner with his girlfriend. Had multiple episodes of vomiting over the last two days, and as of 4pm on day of admission he began vomiting blood and clots. No change in BM, has not had BM for approximately 3 days.  Patient treated for alcohol withdrawal.  H/H remained stable.  Seen by GI who suspected alcoholic gastritis / Destiney Tan tear.  Patient initially agreed for EGD but is now refusing.  Denies any abdominal pain or further vomiting.  Patient educated on alcohol cessation. 25 y/o M with PMhx of alcohol abuse/ alcohol withdrawal presented with abdominal pain, nausea, and hematemesis. Patient was recently here for alcohol induced pancreatitis. Patient left AMA and states he drank two mimosas at a diner with his girlfriend. Had multiple episodes of vomiting over the last two days, and as of 4pm on day of admission he began vomiting blood and clots. No change in BM, has not had BM for approximately 3 days.  Patient treated for alcohol withdrawal.  H/H remained stable.  Seen by GI who suspected alcoholic gastritis / Destiney Tan tear.  Patient initially agreed for EGD but is now refusing.  Denies any abdominal pain or further vomiting.  Patient educated on alcohol cessation    Patient presented with alcoholic gastritis and probable Destiney Tan tear

## 2022-06-13 NOTE — PROGRESS NOTE ADULT - ASSESSMENT
HPI:    · Chief Complaint: The patient is a 26y Male complaining of abdominal pain.  · HPI Objective Statement: Attending MD Caballero : 26 M hx alcohol abuse, alcohol withdrawal p/w abdominal pain, nausea, and hematemesis. Patient was recently here for alcohol induced pancreatitis. Patient AMAd and drank two mimosas at a diner with his girlfriend. Had multiple episodes of vomiting over the last two days, and as of 4pm today began vomiting blood and clots. No change in BM, has not had BM for approximately 3 days.  ---- As Above --- patient is a poor historian  Patient was recently here with N/V and was diagnosed with pancreatitis However, he left AMA. The patient then continued to drink and came back to the ER. He states that he again developed N/V and abdominal pain. But, this time the vomitus became bloody. ( How much ?) He denies NSAIDS /  anticoagulants.  In the ER, the patient continued to have  the pain but the N/V resolved. He actually tolerated two food trays in the ER.  The patient has a history of ETOH abuse ( multiple admissions for ETOH withdrawal ) but was recently diagnosed with pancreatitis. He was told that he has liver disease (? from drinking )     A) N/V, hematemesis, abdominal pain - Patient probably with alcoholic gastritis --> Destiney Tan tear , r/o PUD - tolerating regular diet 1) PPI 2) EGD tomorrow 3) F/U labs  B) Elevated LFTs - OT > PT c/w alcoholic hepatitis See CT Scan / Ultrasound  1.0/121/144/75 Better 1) F/U labs 2) Viral studies
26 M hx alcohol abuse, alcohol withdrawal p/w abdominal pain, nausea, and hematemesis. Patient was recently here for alcohol induced pancreatitis. Patient AMAd and drank two mimosas at a diner with his girlfriend. Had multiple episodes of vomiting over the last two days, and as of 4pm today began vomiting blood and clots. No change in BM, has not had BM for approximately 3 days.

## 2022-06-13 NOTE — DISCHARGE NOTE NURSING/CASE MANAGEMENT/SOCIAL WORK - NSDCPEFALRISK_GEN_ALL_CORE
For information on Fall & Injury Prevention, visit: [email or web address] OR  [email or web address] OR  https://www.cdc.gov/steadi/patient.html

## 2022-06-13 NOTE — DISCHARGE NOTE PROVIDER - NSDCCPCAREPLAN_GEN_ALL_CORE_FT
PRINCIPAL DISCHARGE DIAGNOSIS  Diagnosis: Hematemesis  Assessment and Plan of Treatment:       SECONDARY DISCHARGE DIAGNOSES  Diagnosis: Esophagitis  Assessment and Plan of Treatment:     Diagnosis: Abdominal pain  Assessment and Plan of Treatment:      PRINCIPAL DISCHARGE DIAGNOSIS  Diagnosis: Destiney-Tan tear  Assessment and Plan of Treatment:       SECONDARY DISCHARGE DIAGNOSES  Diagnosis: Esophagitis  Assessment and Plan of Treatment:     Diagnosis: Abdominal pain  Assessment and Plan of Treatment:     Diagnosis: Alcoholic gastritis  Assessment and Plan of Treatment:

## 2022-06-13 NOTE — DISCHARGE NOTE PROVIDER - NSDCMRMEDTOKEN_GEN_ALL_CORE_FT
folic acid 1 mg oral tablet: 1 tab(s) orally once a day  Multiple Vitamins oral tablet: 1 tab(s) orally once a day  Protonix 40 mg oral delayed release tablet: 40 tab(s) orally once a day   thiamine 100 mg oral tablet: 1 tab(s) orally once a day

## 2022-06-18 ENCOUNTER — EMERGENCY (EMERGENCY)
Facility: HOSPITAL | Age: 27
LOS: 0 days | Discharge: HOME | End: 2022-06-18
Attending: STUDENT IN AN ORGANIZED HEALTH CARE EDUCATION/TRAINING PROGRAM

## 2022-06-18 VITALS
RESPIRATION RATE: 16 BRPM | HEART RATE: 77 BPM | DIASTOLIC BLOOD PRESSURE: 64 MMHG | OXYGEN SATURATION: 97 % | TEMPERATURE: 98 F | SYSTOLIC BLOOD PRESSURE: 106 MMHG

## 2022-06-18 VITALS
DIASTOLIC BLOOD PRESSURE: 76 MMHG | WEIGHT: 154.98 LBS | RESPIRATION RATE: 19 BRPM | HEIGHT: 69 IN | HEART RATE: 87 BPM | SYSTOLIC BLOOD PRESSURE: 111 MMHG

## 2022-06-18 DIAGNOSIS — Z87.19 PERSONAL HISTORY OF OTHER DISEASES OF THE DIGESTIVE SYSTEM: ICD-10-CM

## 2022-06-18 DIAGNOSIS — F10.129 ALCOHOL ABUSE WITH INTOXICATION, UNSPECIFIED: ICD-10-CM

## 2022-06-18 PROCEDURE — 99284 EMERGENCY DEPT VISIT MOD MDM: CPT

## 2022-06-18 RX ORDER — DIPHENHYDRAMINE HCL 50 MG
50 CAPSULE ORAL ONCE
Refills: 0 | Status: COMPLETED | OUTPATIENT
Start: 2022-06-18 | End: 2022-06-18

## 2022-06-18 RX ORDER — HALOPERIDOL DECANOATE 100 MG/ML
5 INJECTION INTRAMUSCULAR ONCE
Refills: 0 | Status: COMPLETED | OUTPATIENT
Start: 2022-06-18 | End: 2022-06-18

## 2022-06-18 RX ADMIN — Medication 2 MILLIGRAM(S): at 04:31

## 2022-06-18 RX ADMIN — Medication 50 MILLIGRAM(S): at 04:32

## 2022-06-18 RX ADMIN — HALOPERIDOL DECANOATE 5 MILLIGRAM(S): 100 INJECTION INTRAMUSCULAR at 04:33

## 2022-06-18 NOTE — ED PROVIDER NOTE - OBJECTIVE STATEMENT
26M presenting in handcuffs with police for alcohol intoxication. Generally uncooperative and aggressive towards staff. requiring im sedation for aggressive behavior. security and 4point restraints.

## 2022-06-18 NOTE — ED PROVIDER NOTE - CLINICAL SUMMARY MEDICAL DECISION MAKING FREE TEXT BOX
bib police for intox and confrontation with ?roommate  disruptive in ED acting aggressively towards myself and staff  IM sedation and 4point restraints placed bib police for intox and confrontation with ?roommate  disruptive in ED acting aggressively towards myself and staff  IM sedation and 4point restraints placed     Pt now responsive and clinically sober and able to understand instructions, A&Ox3 and walking without difficulty - will dc.

## 2022-06-18 NOTE — ED ADULT NURSE REASSESSMENT NOTE - NS ED NURSE REASSESS COMMENT FT1
Pt received from JULIANNA [facility]arr[facility]. Remains on 1:1 for violent behavior. On CM, NSR. Sleeping comfortably in stretcher. Spont breathing on room air

## 2022-06-18 NOTE — ED ADULT NURSE NOTE - CHIEF COMPLAINT QUOTE
pt from shelter PT was banging on a person door EMS  called because pt was threatening person. EMS reports strong odor of alcohol although pt denies drinking. pt accompanied by police in cuffs. pt is verbal aggressive with police in triage refuse temp and saturation

## 2022-06-18 NOTE — ED PROVIDER NOTE - NSFOLLOWUPINSTRUCTIONS_ED_ALL_ED_FT
Alcohol Intoxication      Alcohol intoxication occurs when a person no longer thinks clearly or functions well (becomes impaired) after drinking alcohol. Intoxication can occur with just one drink. The legal definition of alcohol intoxication depends on the amount of alcohol in the blood (blood alcohol concentration, JILLIAN). JILLIAN of 80–100 mg/dL or higher is commonly considered legally intoxicated. The level of impairment depends on:  •The amount of alcohol the person had.      •The person's age, gender, and weight.      •How often the person drinks.      •Whether the person has other medical conditions, such as diabetes, seizures, or a heart condition.      Alcohol intoxication can range from mild to severe. The condition can be dangerous, especially if the person:  •Also took certain drugs or prescription medicines.    •Drinks a large amount of alcohol in a short period of time (binge drinks).  •For women, binge drinking is having four or more drinks at one time.      •For men, binge drinking is having five or more drinks at one time.        If you or anyone around you appears intoxicated, speak up and act.      What are the causes?    This condition is caused by drinking alcohol.      What increases the risk?    The following factors may make you more likely to develop this condition:  •Peer pressure in young adults.      •Difficulty managing stress.      •History of drug or alcohol abuse.      •Combining alcohol with drugs.      •Family history of drug or alcohol abuse.      •Low body weight.      •Binge drinking.        What are the signs or symptoms?    Symptoms of alcohol intoxication can vary from person to person. Symptoms can be mild, moderate, or severe.    Symptoms of mild alcohol intoxication may include:  •Feeling relaxed or sleepy.      •Having mild difficulty with coordination, speech, memory, or attention.      Symptoms of moderate alcohol intoxication may include:  •Extreme emotions, like anger or sadness.      •Moderate difficulty with coordination, speech, memory, or attention.      Symptoms of severe alcohol intoxication may include:  •Severe difficulty with coordination, speech, memory, or attention.      •Passing out.      •Vomiting.      •Confusion.      •Slow breathing.      •Coma.      Intoxication can change quickly from mild to severe. It can cause coma or death, especially in people who are not exposed to alcohol often.      How is this diagnosed?    Your health care provider will ask you how much alcohol you drank and what kind you had. Intoxication may also be diagnosed based on:  •Your symptoms and medical history.      •A physical exam.      •A blood test that measures JILLIAN.      •A smell of alcohol on your breath.        How is this treated?    Treatment for alcohol intoxication may include:  •Being monitored in an emergency department, hospital, or treatment center until your JILLIAN comes down and it is safe for you to go home.      •IV fluids to prevent or treat loss of fluid in the body (dehydration).      •Medicine to treat nausea or vomiting or to get rid of alcohol in the body.      •Counseling (brief intervention) about the dangers of using alcohol.      •Treatment for substance use disorder.      •Oxygen therapy or a breathing machine (ventilator).      Long-term (chronic) exposure to alcohol can have long-term effects on your brain, heart, and gastrointestinal system. These effects can be serious and may also require treatment.      Follow these instructions at home:       Eating and drinking    • Do not drink alcohol if:  •Your health care provider tells you not to drink.      •You are pregnant, may be pregnant, or are planning to become pregnant.      •You are under the legal drinking age (21 years old in the [place]      •You are taking medicines that should not be taken with alcohol.      •You have a medical condition, and alcohol makes it worse.      •You need to drive or perform activities that require you to be alert.      •You have substance use disorder.      •Ask your health care provider if alcohol is safe for you. If your health care provider allows you to drink alcohol, limit how much you have. You may drink:  •0–1 drink a day for women.     • 0–2 drinks a day for men.   •Be aware of how much alcohol is in your drink. In the [place] one drink equals one 12 oz bottle of beer (355 mL), one 5 oz glass of wine (148 mL), or one 1½ oz shot of hard liquor (44 mL).          •Avoid drinking alcohol on an empty stomach.      •Stay hydrated. Drink enough fluid to keep your urine pale yellow. Avoid caffeine because it can dehydrate you.      •Avoid drinking more than one drink per hour.      •When having multiple drinks, drink water or a non-alcoholic beverage between alcoholic drinks.      General instructions     •Take over-the-counter and prescription medicines only as told by your health care provider.      • Do not drive after drinking any amount of alcohol. Plan for a designated  or another way to go home.      •Have someone responsible stay with you while you are intoxicated. You should not be left alone.      •Keep all follow-up visits as told by your health care provider. This is important.        Contact a health care provider if:    •You do not feel better after a few days.      •You have problems at work, at school, or at home due to drinking.        Get help right away if:  •You have any of the following:  •Moderate to severe trouble with coordination, speech, memory, or attention.      •Trouble staying awake.      •Severe confusion.      •A seizure.      •Light-headedness.      •Fainting.      •Vomiting bright red blood or material that looks like coffee grounds.      •Bloody stool (feces). The blood may make your stool bright red, black, or tarry. It may also smell bad.      •Shakiness when trying to stop drinking.      •Thoughts about hurting yourself or others.        If you ever feel like you may hurt yourself or others, or have thoughts about taking your own life, get help right away. You can go to your nearest emergency department or call:   • Your local emergency services (911 in the [place]       • A suicide crisis helpline, such as the National Suicide Prevention Lifeline at 1-187.803.5641. This is open 24 hours a day.         Summary    •Alcohol intoxication occurs when a person no longer thinks clearly or functions well after drinking alcohol.      •If your health care provider says that alcohol is safe for you, limit alcohol intake to no more than 1 drink a day for women (no drinks if you are pregnant) and 2 drinks a day for men. One drink equals 12 oz of beer, 5 oz of wine, or 1½ oz of hard liquor.      •Contact your health care provider if drinking has caused you problems at work, school, or home.      •Get help right away if you have thoughts about hurting yourself or others.      This information is not intended to replace advice given to you by your health care provider. Make sure you discuss any questions you have with your health care provider.
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
## 2022-06-18 NOTE — ED PROVIDER NOTE - PHYSICAL EXAMINATION
Constitutional: Intoxicated. Aggressive towards staff.   ENMT: Airway patent.  Eyes: Clear bilaterally, pupils equal, round and reactive to light.  Cardiac: Normal rate, regular rhythm.  Heart sounds S1, S2.  Respiratory: Breath sounds clear and equal bilaterally.  Gastrointestinal: Abdomen soft, non-tender, no guarding.  Neurological: No focal deficits.   Skin: No evidence of rash.

## 2022-06-18 NOTE — ED PROVIDER NOTE - PATIENT PORTAL LINK FT
You can access the FollowMyHealth Patient Portal offered by [facility] by registering at the following website: [email or web address]. By joining [employer]’s FollowMyHealth portal, you will also be able to view your health information using other applications (apps) compatible with our system.
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
## 2022-06-22 ENCOUNTER — EMERGENCY (EMERGENCY)
Facility: HOSPITAL | Age: 27
LOS: 0 days | Discharge: HOME | End: 2022-06-22
Attending: STUDENT IN AN ORGANIZED HEALTH CARE EDUCATION/TRAINING PROGRAM
Payer: MEDICAID

## 2022-06-22 VITALS
RESPIRATION RATE: 15 BRPM | SYSTOLIC BLOOD PRESSURE: 129 MMHG | DIASTOLIC BLOOD PRESSURE: 78 MMHG | OXYGEN SATURATION: 99 % | TEMPERATURE: 98 F | HEART RATE: 88 BPM | HEIGHT: 68 IN | WEIGHT: 177.91 LBS

## 2022-06-22 VITALS
HEART RATE: 93 BPM | RESPIRATION RATE: 16 BRPM | SYSTOLIC BLOOD PRESSURE: 125 MMHG | TEMPERATURE: 98 F | OXYGEN SATURATION: 96 % | DIASTOLIC BLOOD PRESSURE: 76 MMHG

## 2022-06-22 DIAGNOSIS — Z91.018 ALLERGY TO OTHER FOODS: ICD-10-CM

## 2022-06-22 DIAGNOSIS — F10.929 ALCOHOL USE, UNSPECIFIED WITH INTOXICATION, UNSPECIFIED: ICD-10-CM

## 2022-06-22 DIAGNOSIS — R11.10 VOMITING, UNSPECIFIED: ICD-10-CM

## 2022-06-22 DIAGNOSIS — R10.9 UNSPECIFIED ABDOMINAL PAIN: ICD-10-CM

## 2022-06-22 LAB
ALBUMIN SERPL ELPH-MCNC: 3.6 G/DL — SIGNIFICANT CHANGE UP (ref 3.3–5)
ALP SERPL-CCNC: 73 U/L — SIGNIFICANT CHANGE UP (ref 40–120)
ALT FLD-CCNC: 90 U/L — HIGH (ref 12–78)
ANION GAP SERPL CALC-SCNC: 13 MMOL/L — SIGNIFICANT CHANGE UP (ref 5–17)
ANISOCYTOSIS BLD QL: SLIGHT — SIGNIFICANT CHANGE UP
APTT BLD: 27.6 SEC — SIGNIFICANT CHANGE UP (ref 27.5–35.5)
AST SERPL-CCNC: 124 U/L — HIGH (ref 15–37)
BASOPHILS # BLD AUTO: 0.02 K/UL — SIGNIFICANT CHANGE UP (ref 0–0.2)
BASOPHILS NFR BLD AUTO: 0.7 % — SIGNIFICANT CHANGE UP (ref 0–2)
BILIRUB SERPL-MCNC: 0.3 MG/DL — SIGNIFICANT CHANGE UP (ref 0.2–1.2)
BLD GP AB SCN SERPL QL: SIGNIFICANT CHANGE UP
BUN SERPL-MCNC: 13 MG/DL — SIGNIFICANT CHANGE UP (ref 7–23)
CALCIUM SERPL-MCNC: 8.3 MG/DL — LOW (ref 8.5–10.1)
CHLORIDE SERPL-SCNC: 112 MMOL/L — HIGH (ref 96–108)
CO2 SERPL-SCNC: 23 MMOL/L — SIGNIFICANT CHANGE UP (ref 22–31)
CREAT SERPL-MCNC: 0.86 MG/DL — SIGNIFICANT CHANGE UP (ref 0.5–1.3)
DACRYOCYTES BLD QL SMEAR: SLIGHT — SIGNIFICANT CHANGE UP
EGFR: 121 ML/MIN/1.73M2 — SIGNIFICANT CHANGE UP
EOSINOPHIL # BLD AUTO: 0 K/UL — SIGNIFICANT CHANGE UP (ref 0–0.5)
EOSINOPHIL NFR BLD AUTO: 0 % — SIGNIFICANT CHANGE UP (ref 0–6)
GLUCOSE SERPL-MCNC: 65 MG/DL — LOW (ref 70–99)
HCT VFR BLD CALC: 35.6 % — LOW (ref 39–50)
HGB BLD-MCNC: 11.7 G/DL — LOW (ref 13–17)
IMM GRANULOCYTES NFR BLD AUTO: 0.3 % — SIGNIFICANT CHANGE UP (ref 0–1.5)
INR BLD: 0.97 RATIO — SIGNIFICANT CHANGE UP (ref 0.88–1.16)
LIDOCAIN IGE QN: 160 U/L — SIGNIFICANT CHANGE UP (ref 73–393)
LYMPHOCYTES # BLD AUTO: 1.12 K/UL — SIGNIFICANT CHANGE UP (ref 1–3.3)
LYMPHOCYTES # BLD AUTO: 38.1 % — SIGNIFICANT CHANGE UP (ref 13–44)
MACROCYTES BLD QL: SLIGHT — SIGNIFICANT CHANGE UP
MANUAL SMEAR VERIFICATION: SIGNIFICANT CHANGE UP
MCHC RBC-ENTMCNC: 32.9 G/DL — SIGNIFICANT CHANGE UP (ref 32–36)
MCHC RBC-ENTMCNC: 34.9 PG — HIGH (ref 27–34)
MCV RBC AUTO: 106.3 FL — HIGH (ref 80–100)
MONOCYTES # BLD AUTO: 0.23 K/UL — SIGNIFICANT CHANGE UP (ref 0–0.9)
MONOCYTES NFR BLD AUTO: 7.8 % — SIGNIFICANT CHANGE UP (ref 2–14)
NEUTROPHILS # BLD AUTO: 1.56 K/UL — LOW (ref 1.8–7.4)
NEUTROPHILS NFR BLD AUTO: 53.1 % — SIGNIFICANT CHANGE UP (ref 43–77)
NRBC # BLD: 0 /100 WBCS — SIGNIFICANT CHANGE UP (ref 0–0)
PLAT MORPH BLD: NORMAL — SIGNIFICANT CHANGE UP
PLATELET # BLD AUTO: 168 K/UL — SIGNIFICANT CHANGE UP (ref 150–400)
POIKILOCYTOSIS BLD QL AUTO: SLIGHT — SIGNIFICANT CHANGE UP
POTASSIUM SERPL-MCNC: 3.9 MMOL/L — SIGNIFICANT CHANGE UP (ref 3.5–5.3)
POTASSIUM SERPL-SCNC: 3.9 MMOL/L — SIGNIFICANT CHANGE UP (ref 3.5–5.3)
PROT SERPL-MCNC: 7 GM/DL — SIGNIFICANT CHANGE UP (ref 6–8.3)
PROTHROM AB SERPL-ACNC: 11.6 SEC — SIGNIFICANT CHANGE UP (ref 10.5–13.4)
RBC # BLD: 3.35 M/UL — LOW (ref 4.2–5.8)
RBC # BLD: 3.35 M/UL — LOW (ref 4.2–5.8)
RBC # FLD: 14.2 % — SIGNIFICANT CHANGE UP (ref 10.3–14.5)
RBC BLD AUTO: ABNORMAL
RETICS #: 105.5 K/UL — SIGNIFICANT CHANGE UP (ref 25–125)
RETICS/RBC NFR: 3.2 % — HIGH (ref 0.5–2.5)
SODIUM SERPL-SCNC: 148 MMOL/L — HIGH (ref 135–145)
STOMATOCYTES BLD QL SMEAR: SLIGHT — SIGNIFICANT CHANGE UP
WBC # BLD: 2.94 K/UL — LOW (ref 3.8–10.5)
WBC # FLD AUTO: 2.94 K/UL — LOW (ref 3.8–10.5)

## 2022-06-22 PROCEDURE — 99284 EMERGENCY DEPT VISIT MOD MDM: CPT

## 2022-06-22 RX ORDER — SODIUM CHLORIDE 9 MG/ML
1000 INJECTION INTRAMUSCULAR; INTRAVENOUS; SUBCUTANEOUS ONCE
Refills: 0 | Status: COMPLETED | OUTPATIENT
Start: 2022-06-22 | End: 2022-06-22

## 2022-06-22 RX ORDER — PANTOPRAZOLE SODIUM 20 MG/1
40 TABLET, DELAYED RELEASE ORAL ONCE
Refills: 0 | Status: COMPLETED | OUTPATIENT
Start: 2022-06-22 | End: 2022-06-22

## 2022-06-22 RX ADMIN — SODIUM CHLORIDE 1000 MILLILITER(S): 9 INJECTION INTRAMUSCULAR; INTRAVENOUS; SUBCUTANEOUS at 02:59

## 2022-06-22 RX ADMIN — PANTOPRAZOLE SODIUM 40 MILLIGRAM(S): 20 TABLET, DELAYED RELEASE ORAL at 02:59

## 2022-06-22 NOTE — ED ADULT NURSE REASSESSMENT NOTE - NS ED NURSE REASSESS COMMENT FT1
patient awake after IV insertion, states was vomiting blood, last drink 2 days ago, states was admitted to the hospital two days ago and was going to have a procedure done but he refused the procedure and now wants to have it done and get re admitted, but states he wants food and is here for sickle cell. patient appears intoxicated. pending dr bryant

## 2022-06-22 NOTE — ED ADULT NURSE NOTE - OBJECTIVE STATEMENT
attempted to wake patient up to ask questions, patient would open eyes and fall back to sleep. prior to being placed in a bed, noted patient talking to internal triage in full sentences and was able to ambulate. unable to gather information as of now. pending dr bryant

## 2022-06-22 NOTE — ED PROVIDER NOTE - PATIENT PORTAL LINK FT
You can access the FollowMyHealth Patient Portal offered by [facility] by registering at the following website: [email or web address]. By joining [employer]’s FollowMyHealth portal, you will also be able to view your health information using other applications (apps) compatible with our system.

## 2022-06-22 NOTE — ED PROVIDER NOTE - PROGRESS NOTE DETAILS
Pt is alert and oriented x 3 sts pt is sitting up eating and tolerating food walking with normal gaits without assists. Pt's abd is soft nontender to palp x 4.  Pt is requesting to be discharged.

## 2022-06-22 NOTE — ED PROVIDER NOTE - OBJECTIVE STATEMENT
26M presents to the ED via EMS for intoxication. pt reported to EMS that he was having abdominal pain and vomiting blood but shortly after arrival to ED denied said complaint stating it was an excuse to get brought to the hospital. Pt aggressive towards myself and staff, beligerent, denies medical complaints, demanding food.

## 2022-06-22 NOTE — ED PROVIDER NOTE - PHYSICAL EXAMINATION
Constitutional: intoxicated, agitated.  ENMT: Airway patent.  Eyes: Clear bilaterally, pupils equal, round and reactive to light.  Cardiac: Normal rate, regular rhythm.  Heart sounds S1, S2.  Respiratory: Breath sounds clear and equal bilaterally.  Gastrointestinal: Abdomen soft, non-tender, no guarding.  Neurological: No focal deficits, no motor or sensory deficits.  Skin: No evidence of rash.

## 2022-06-22 NOTE — ED PROVIDER NOTE - CLINICAL SUMMARY MEDICAL DECISION MAKING FREE TEXT BOX
pt arrives intoxicated by ems  rude/aggressive with staff  denies vomiting blood, reports was a lie to get to the ED  will monitor for continued agitation. required im sedation and restraints during last ed visit

## 2022-07-12 ENCOUNTER — INPATIENT (INPATIENT)
Facility: HOSPITAL | Age: 27
LOS: 2 days | Discharge: HOME | End: 2022-07-15
Attending: INTERNAL MEDICINE | Admitting: INTERNAL MEDICINE

## 2022-07-12 VITALS
OXYGEN SATURATION: 97 % | HEART RATE: 93 BPM | TEMPERATURE: 98 F | WEIGHT: 179.02 LBS | SYSTOLIC BLOOD PRESSURE: 137 MMHG | RESPIRATION RATE: 17 BRPM | DIASTOLIC BLOOD PRESSURE: 105 MMHG | HEIGHT: 68 IN

## 2022-07-12 DIAGNOSIS — S01.01XA LACERATION WITHOUT FOREIGN BODY OF SCALP, INITIAL ENCOUNTER: Chronic | ICD-10-CM

## 2022-07-12 PROBLEM — F10.10 ALCOHOL ABUSE, UNCOMPLICATED: Chronic | Status: ACTIVE | Noted: 2022-06-01

## 2022-07-12 PROBLEM — K85.90 ACUTE PANCREATITIS WITHOUT NECROSIS OR INFECTION, UNSPECIFIED: Chronic | Status: ACTIVE | Noted: 2022-06-01

## 2022-07-12 LAB
ALBUMIN SERPL ELPH-MCNC: 3.7 G/DL — SIGNIFICANT CHANGE UP (ref 3.3–5)
ALP SERPL-CCNC: 80 U/L — SIGNIFICANT CHANGE UP (ref 40–120)
ALT FLD-CCNC: 115 U/L — HIGH (ref 12–78)
ANION GAP SERPL CALC-SCNC: 14 MMOL/L — SIGNIFICANT CHANGE UP (ref 5–17)
ANISOCYTOSIS BLD QL: SLIGHT — SIGNIFICANT CHANGE UP
APPEARANCE UR: CLEAR — SIGNIFICANT CHANGE UP
AST SERPL-CCNC: 297 U/L — HIGH (ref 15–37)
BACTERIA # UR AUTO: ABNORMAL
BASE EXCESS BLDV CALC-SCNC: 9.7 MMOL/L — HIGH (ref -2–3)
BASOPHILS # BLD AUTO: 0.02 K/UL — SIGNIFICANT CHANGE UP (ref 0–0.2)
BASOPHILS NFR BLD AUTO: 1 % — SIGNIFICANT CHANGE UP (ref 0–2)
BILIRUB SERPL-MCNC: 0.5 MG/DL — SIGNIFICANT CHANGE UP (ref 0.2–1.2)
BILIRUB UR-MCNC: NEGATIVE — SIGNIFICANT CHANGE UP
BLOOD GAS COMMENTS, VENOUS: SIGNIFICANT CHANGE UP
BUN SERPL-MCNC: 8 MG/DL — SIGNIFICANT CHANGE UP (ref 7–23)
CALCIUM SERPL-MCNC: 8.9 MG/DL — SIGNIFICANT CHANGE UP (ref 8.5–10.1)
CHLORIDE SERPL-SCNC: 102 MMOL/L — SIGNIFICANT CHANGE UP (ref 96–108)
CO2 BLDV-SCNC: 36 MMOL/L — HIGH (ref 22–26)
CO2 SERPL-SCNC: 27 MMOL/L — SIGNIFICANT CHANGE UP (ref 22–31)
COLOR SPEC: YELLOW — SIGNIFICANT CHANGE UP
CREAT SERPL-MCNC: 0.85 MG/DL — SIGNIFICANT CHANGE UP (ref 0.5–1.3)
DIFF PNL FLD: NEGATIVE — SIGNIFICANT CHANGE UP
EGFR: 121 ML/MIN/1.73M2 — SIGNIFICANT CHANGE UP
EOSINOPHIL # BLD AUTO: 0 K/UL — SIGNIFICANT CHANGE UP (ref 0–0.5)
EOSINOPHIL NFR BLD AUTO: 0 % — SIGNIFICANT CHANGE UP (ref 0–6)
EPI CELLS # UR: SIGNIFICANT CHANGE UP
ETHANOL SERPL-MCNC: 228 MG/DL — HIGH (ref 0–10)
FLUAV AG NPH QL: SIGNIFICANT CHANGE UP
FLUBV AG NPH QL: SIGNIFICANT CHANGE UP
GAS PNL BLDV: SIGNIFICANT CHANGE UP
GLUCOSE SERPL-MCNC: 85 MG/DL — SIGNIFICANT CHANGE UP (ref 70–99)
GLUCOSE UR QL: NEGATIVE MG/DL — SIGNIFICANT CHANGE UP
HCO3 BLDV-SCNC: 35 MMOL/L — HIGH (ref 22–28)
HCT VFR BLD CALC: 38.8 % — LOW (ref 39–50)
HGB BLD-MCNC: 13.4 G/DL — SIGNIFICANT CHANGE UP (ref 13–17)
HOROWITZ INDEX BLDV+IHG-RTO: 21 — SIGNIFICANT CHANGE UP
KETONES UR-MCNC: ABNORMAL
LACTATE SERPL-SCNC: 3.2 MMOL/L — HIGH (ref 0.7–2)
LACTATE SERPL-SCNC: 3.5 MMOL/L — HIGH (ref 0.7–2)
LEUKOCYTE ESTERASE UR-ACNC: NEGATIVE — SIGNIFICANT CHANGE UP
LIDOCAIN IGE QN: 224 U/L — SIGNIFICANT CHANGE UP (ref 73–393)
LYMPHOCYTES # BLD AUTO: 1.19 K/UL — SIGNIFICANT CHANGE UP (ref 1–3.3)
LYMPHOCYTES # BLD AUTO: 56 % — HIGH (ref 13–44)
MACROCYTES BLD QL: SLIGHT — SIGNIFICANT CHANGE UP
MAGNESIUM SERPL-MCNC: 2.1 MG/DL — SIGNIFICANT CHANGE UP (ref 1.6–2.6)
MANUAL SMEAR VERIFICATION: SIGNIFICANT CHANGE UP
MCHC RBC-ENTMCNC: 34.5 G/DL — SIGNIFICANT CHANGE UP (ref 32–36)
MCHC RBC-ENTMCNC: 35.8 PG — HIGH (ref 27–34)
MCV RBC AUTO: 103.7 FL — HIGH (ref 80–100)
METAMYELOCYTES # FLD: 1 % — HIGH (ref 0–0)
MONOCYTES # BLD AUTO: 0.08 K/UL — SIGNIFICANT CHANGE UP (ref 0–0.9)
MONOCYTES NFR BLD AUTO: 4 % — SIGNIFICANT CHANGE UP (ref 2–14)
NEUTROPHILS # BLD AUTO: 0.76 K/UL — LOW (ref 1.8–7.4)
NEUTROPHILS NFR BLD AUTO: 35 % — LOW (ref 43–77)
NEUTS BAND # BLD: 1 % — SIGNIFICANT CHANGE UP (ref 0–8)
NITRITE UR-MCNC: NEGATIVE — SIGNIFICANT CHANGE UP
NRBC # BLD: 0 /100 — SIGNIFICANT CHANGE UP (ref 0–0)
NRBC # BLD: SIGNIFICANT CHANGE UP /100 WBCS (ref 0–0)
PCO2 BLDV: 48 MMHG — SIGNIFICANT CHANGE UP (ref 42–55)
PH BLDV: 7.47 — HIGH (ref 7.32–7.43)
PH UR: 7 — SIGNIFICANT CHANGE UP (ref 5–8)
PLAT MORPH BLD: NORMAL — SIGNIFICANT CHANGE UP
PLATELET # BLD AUTO: 131 K/UL — LOW (ref 150–400)
PO2 BLDV: 63 MMHG — HIGH (ref 25–45)
POIKILOCYTOSIS BLD QL AUTO: SLIGHT — SIGNIFICANT CHANGE UP
POTASSIUM SERPL-MCNC: 3.2 MMOL/L — LOW (ref 3.5–5.3)
POTASSIUM SERPL-SCNC: 3.2 MMOL/L — LOW (ref 3.5–5.3)
PROT SERPL-MCNC: 7.8 GM/DL — SIGNIFICANT CHANGE UP (ref 6–8.3)
PROT UR-MCNC: 15 MG/DL
RBC # BLD: 3.74 M/UL — LOW (ref 4.2–5.8)
RBC # BLD: 3.74 M/UL — LOW (ref 4.2–5.8)
RBC # FLD: 13 % — SIGNIFICANT CHANGE UP (ref 10.3–14.5)
RBC BLD AUTO: ABNORMAL
RBC CASTS # UR COMP ASSIST: SIGNIFICANT CHANGE UP /HPF (ref 0–4)
RETICS #: 71.4 K/UL — SIGNIFICANT CHANGE UP (ref 25–125)
RETICS/RBC NFR: 1.9 % — SIGNIFICANT CHANGE UP (ref 0.5–2.5)
SAO2 % BLDV: 88.1 % — LOW (ref 94–98)
SARS-COV-2 RNA SPEC QL NAA+PROBE: SIGNIFICANT CHANGE UP
SODIUM SERPL-SCNC: 143 MMOL/L — SIGNIFICANT CHANGE UP (ref 135–145)
SP GR SPEC: 1.01 — SIGNIFICANT CHANGE UP (ref 1.01–1.02)
STOMATOCYTES BLD QL SMEAR: SLIGHT — SIGNIFICANT CHANGE UP
UROBILINOGEN FLD QL: 4 MG/DL
VARIANT LYMPHS # BLD: 2 % — SIGNIFICANT CHANGE UP (ref 0–6)
WBC # BLD: 2.12 K/UL — LOW (ref 3.8–10.5)
WBC # FLD AUTO: 2.12 K/UL — LOW (ref 3.8–10.5)
WBC UR QL: SIGNIFICANT CHANGE UP

## 2022-07-12 PROCEDURE — 71045 X-RAY EXAM CHEST 1 VIEW: CPT | Mod: 26

## 2022-07-12 PROCEDURE — 93010 ELECTROCARDIOGRAM REPORT: CPT

## 2022-07-12 PROCEDURE — 99223 1ST HOSP IP/OBS HIGH 75: CPT

## 2022-07-12 PROCEDURE — 99285 EMERGENCY DEPT VISIT HI MDM: CPT

## 2022-07-12 RX ORDER — DIAZEPAM 5 MG
10 TABLET ORAL ONCE
Refills: 0 | Status: DISCONTINUED | OUTPATIENT
Start: 2022-07-12 | End: 2022-07-12

## 2022-07-12 RX ORDER — ONDANSETRON 8 MG/1
4 TABLET, FILM COATED ORAL ONCE
Refills: 0 | Status: COMPLETED | OUTPATIENT
Start: 2022-07-12 | End: 2022-07-12

## 2022-07-12 RX ORDER — SODIUM CHLORIDE 9 MG/ML
1000 INJECTION INTRAMUSCULAR; INTRAVENOUS; SUBCUTANEOUS
Refills: 0 | Status: DISCONTINUED | OUTPATIENT
Start: 2022-07-12 | End: 2022-07-15

## 2022-07-12 RX ORDER — SODIUM CHLORIDE 9 MG/ML
1000 INJECTION, SOLUTION INTRAVENOUS ONCE
Refills: 0 | Status: COMPLETED | OUTPATIENT
Start: 2022-07-12 | End: 2022-07-12

## 2022-07-12 RX ORDER — PANTOPRAZOLE SODIUM 20 MG/1
40 TABLET, DELAYED RELEASE ORAL
Refills: 0 | Status: DISCONTINUED | OUTPATIENT
Start: 2022-07-12 | End: 2022-07-15

## 2022-07-12 RX ORDER — POTASSIUM CHLORIDE 20 MEQ
40 PACKET (EA) ORAL ONCE
Refills: 0 | Status: COMPLETED | OUTPATIENT
Start: 2022-07-12 | End: 2022-07-12

## 2022-07-12 RX ORDER — THIAMINE MONONITRATE (VIT B1) 100 MG
100 TABLET ORAL DAILY
Refills: 0 | Status: COMPLETED | OUTPATIENT
Start: 2022-07-12 | End: 2022-07-15

## 2022-07-12 RX ORDER — ONDANSETRON 8 MG/1
4 TABLET, FILM COATED ORAL EVERY 8 HOURS
Refills: 0 | Status: DISCONTINUED | OUTPATIENT
Start: 2022-07-12 | End: 2022-07-15

## 2022-07-12 RX ORDER — FOLIC ACID 0.8 MG
1 TABLET ORAL DAILY
Refills: 0 | Status: DISCONTINUED | OUTPATIENT
Start: 2022-07-12 | End: 2022-07-15

## 2022-07-12 RX ORDER — SODIUM CHLORIDE 9 MG/ML
1000 INJECTION, SOLUTION INTRAVENOUS
Refills: 0 | Status: DISCONTINUED | OUTPATIENT
Start: 2022-07-12 | End: 2022-07-12

## 2022-07-12 RX ORDER — SODIUM CHLORIDE 9 MG/ML
1000 INJECTION INTRAMUSCULAR; INTRAVENOUS; SUBCUTANEOUS ONCE
Refills: 0 | Status: COMPLETED | OUTPATIENT
Start: 2022-07-12 | End: 2022-07-12

## 2022-07-12 RX ORDER — METOCLOPRAMIDE HCL 10 MG
10 TABLET ORAL ONCE
Refills: 0 | Status: COMPLETED | OUTPATIENT
Start: 2022-07-12 | End: 2022-07-12

## 2022-07-12 RX ORDER — FAMOTIDINE 10 MG/ML
20 INJECTION INTRAVENOUS ONCE
Refills: 0 | Status: COMPLETED | OUTPATIENT
Start: 2022-07-12 | End: 2022-07-12

## 2022-07-12 RX ADMIN — Medication 50 MILLIGRAM(S): at 12:16

## 2022-07-12 RX ADMIN — SODIUM CHLORIDE 1000 MILLILITER(S): 9 INJECTION INTRAMUSCULAR; INTRAVENOUS; SUBCUTANEOUS at 10:30

## 2022-07-12 RX ADMIN — Medication 30 MILLILITER(S): at 12:15

## 2022-07-12 RX ADMIN — Medication 10 MILLIGRAM(S): at 09:17

## 2022-07-12 RX ADMIN — Medication 50 MILLIGRAM(S): at 23:47

## 2022-07-12 RX ADMIN — SODIUM CHLORIDE 500 MILLILITER(S): 9 INJECTION, SOLUTION INTRAVENOUS at 09:27

## 2022-07-12 RX ADMIN — Medication 50 MILLIGRAM(S): at 18:50

## 2022-07-12 RX ADMIN — ONDANSETRON 4 MILLIGRAM(S): 8 TABLET, FILM COATED ORAL at 12:15

## 2022-07-12 RX ADMIN — ONDANSETRON 4 MILLIGRAM(S): 8 TABLET, FILM COATED ORAL at 09:16

## 2022-07-12 RX ADMIN — FAMOTIDINE 20 MILLIGRAM(S): 10 INJECTION INTRAVENOUS at 12:16

## 2022-07-12 RX ADMIN — Medication 40 MILLIEQUIVALENT(S): at 12:16

## 2022-07-12 RX ADMIN — SODIUM CHLORIDE 100 MILLILITER(S): 9 INJECTION INTRAMUSCULAR; INTRAVENOUS; SUBCUTANEOUS at 21:33

## 2022-07-12 RX ADMIN — SODIUM CHLORIDE 1000 MILLILITER(S): 9 INJECTION, SOLUTION INTRAVENOUS at 11:00

## 2022-07-12 RX ADMIN — Medication 10 MILLIGRAM(S): at 12:57

## 2022-07-12 RX ADMIN — SODIUM CHLORIDE 1000 MILLILITER(S): 9 INJECTION, SOLUTION INTRAVENOUS at 12:57

## 2022-07-12 RX ADMIN — SODIUM CHLORIDE 1000 MILLILITER(S): 9 INJECTION INTRAMUSCULAR; INTRAVENOUS; SUBCUTANEOUS at 09:16

## 2022-07-12 NOTE — ED ADULT TRIAGE NOTE - CHIEF COMPLAINT QUOTE
Patient c/o abdominal pain and alcohol withdrawal x1 day. Last drink yesterday.  hx ETOH, HTN, sickle cell ,pancreatitis

## 2022-07-12 NOTE — H&P ADULT - NSICDXPASTMEDICALHX_GEN_ALL_CORE_FT
PAST MEDICAL HISTORY:  Alcohol abuse     Pancreatitis      PAST MEDICAL HISTORY:  Alcohol abuse     Pancreatitis     Sickle cell disease

## 2022-07-12 NOTE — ED PROVIDER NOTE - ATTENDING CONTRIBUTION TO CARE
[FreeTextEntry1] : Patient is a 30M with no PMH who presents for evaluation of anal pain with BM.  Patient states it has been present for a few years.  It has an intermittent course.  Recently the pain has been worse and is associated with bleeding.  He has issues with constipation and has a BM every 1-2 days.  They are firm and he has to strain.  Patient denies fevers, chills, nausea, vomiting, abdominal pain, diarrhea, blood in his stool or unexpected weight loss.  Patient denies a family history of colon cancer rectal cancer or inflammatory bowel disease.  Patient had a colonoscopy in 2016 which he states was normal.  He does not remember who performed the exam.\par  25 y/o M pmhx sickle cell disease, alcohol use disorder, pancreatitis c/o vomiting for 1-2 days. uncomfortable apeparing on exam,ttp in upper abdominal area; lctate elevated, lipase wnl. recent CT for similar symptoms with evidence of gastritis, H/H stable. likely alcoholic gastritis, will not obtain repeat ct at this time, will require admission as unable to tolerate po, no lcincial signs of etoh withdrawal.

## 2022-07-12 NOTE — ED PROVIDER NOTE - OBJECTIVE STATEMENT
25 y/o M pmhx sickle cell disease, alcohol use disorder, pancreatitis c/o vomiting since yesterday with abdominal pain, general body pain and weakness. he states that he feels like he is in withdrawal. patient drinks daily 1L of alcohol. last drink was yesterday. he was admitted multiple times in the past for alcohol withrawal. he states that he is interested in detox and rehab. denies chest pain, sob, fever, chills. denies hx of acute chest.
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
## 2022-07-12 NOTE — ED ADULT NURSE NOTE - OBJECTIVE STATEMENT
Patient is drowsy, oriented x4. Came in for mid abdominal pain. Pain score is 9/10. Also complains of n/v and dizziness. Nil diarrhea reported. Last alcohol drink last night. Also complains of sickle cell pain.

## 2022-07-12 NOTE — ED PROVIDER NOTE - NS ED ATTENDING STATEMENT MOD
I have seen and examined this patient and fully participated in the care of this patient as the teaching attending.  The service was shared with the KARAN.  I reviewed and verified the documentation and independently performed the documented:

## 2022-07-12 NOTE — ED PROVIDER NOTE - PROGRESS NOTE DETAILS
NP Marizol: repeat lactate after fluids 3.2. alcohol level 288. pateint with multiple episodes of vomiting in ED after zofran unable to tolerate PO. -- otherwise hemodynamically stable. started librium to prevent withdrawal. will give more fluids, reglan and  admit to hospitalist NP Marizol:  alcohol level 288, lipase negative. pateint with multiple episodes of vomiting in ED after zofran unable to tolerate PO. -- otherwise hemodynamically stable. started librium to prevent withdrawal. will give more fluids, reglan and  admit to hospitalist

## 2022-07-12 NOTE — ED ADULT NURSE NOTE - NSICDXPASTMEDICALHX_GEN_ALL_CORE_FT
PAST MEDICAL HISTORY:  Alcohol abuse     ETOH abuse     No pertinent past medical history     Pancreatitis

## 2022-07-12 NOTE — H&P ADULT - NSHPPHYSICALEXAM_GEN_ALL_CORE
PHYSICAL EXAM:    Vital Signs Last 24 Hrs  T(C): 37.2 (12 Jul 2022 11:14), Max: 37.2 (12 Jul 2022 11:14)  T(F): 99 (12 Jul 2022 11:14), Max: 99 (12 Jul 2022 11:14)  HR: 96 (12 Jul 2022 11:14) (93 - 96)  BP: 127/82 (12 Jul 2022 11:14) (127/82 - 137/105)  BP(mean): --  RR: 18 (12 Jul 2022 11:14) (17 - 18)  SpO2: 97% (12 Jul 2022 11:14) (97% - 97%)    Parameters below as of 12 Jul 2022 11:14  Patient On (Oxygen Delivery Method): room air      GENERAL: Pt lying in bed comfortably in NAD  HEENT:  Atraumatic, EOMI, PERRL, conjunctiva and sclera clear, MMM  NECK: Supple,  CHEST/LUNG: Clear to auscultation bilaterally  HEART: Regular rate and rhythm  ABDOMEN: Bowel sounds present; Soft, tender diffusely, worse in epigastrium, Nondistended.    EXTREMITIES:  2+ Peripheral Pulses, brisk capillary refill. No edema  NEUROLOGICAL:  Alert & Oriented X3. No deficits   MSK: FROM x 4 extremities   SKIN: No rashes or lesions
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
## 2022-07-12 NOTE — ED PROVIDER NOTE - CLINICAL SUMMARY MEDICAL DECISION MAKING FREE TEXT BOX
25 y/o M pmhx sickle cell disease, alcohol use disorder, pancreatitis c/o vomiting since yesterday with abdominal pain, general body pain and weakness. he states that he feels like he is in withdrawal. patient drinks daily 1L of alcohol. last drink was yesterday. he was admitted multiple times in the past for alcohol withrawal. he states that he is interested in detox and rehab. denies chest pain, sob, fever, chills. denies hx of acute chest. -- epigastric tenderness, vomiting during assessment. resting tremor. -- will check labs, retic, chest xray, meds and refer for SBIRT

## 2022-07-12 NOTE — H&P ADULT - NSHPLABSRESULTS_GEN_ALL_CORE
T(C): 37.2 (07-12-22 @ 11:14), Max: 37.2 (07-12-22 @ 11:14)  HR: 96 (07-12-22 @ 11:14) (93 - 96)  BP: 127/82 (07-12-22 @ 11:14) (127/82 - 137/105)  RR: 18 (07-12-22 @ 11:14) (17 - 18)  SpO2: 97% (07-12-22 @ 11:14) (97% - 97%)                        13.4   2.12  )-----------( 131      ( 12 Jul 2022 09:10 )             38.8     07-12    143  |  102  |  8   ----------------------------<  85  3.2<L>   |  27  |  0.85    Ca    8.9      12 Jul 2022 09:10  Mg     2.1     07-12    TPro  7.8  /  Alb  3.7  /  TBili  0.5  /  DBili  x   /  AST  297<H>  /  ALT  115<H>  /  AlkPhos  80  07-12    LIVER FUNCTIONS - ( 12 Jul 2022 09:10 )  Alb: 3.7 g/dL / Pro: 7.8 gm/dL / ALK PHOS: 80 U/L / ALT: 115 U/L / AST: 297 U/L / GGT: x               aluminum hydroxide/magnesium hydroxide/simethicone Suspension 30 milliLiter(s) Oral every 4 hours PRN  chlordiazePOXIDE   Oral   dextrose 5% + sodium chloride 0.45%. 1000 milliLiter(s) IV Continuous <Continuous>  folic acid 1 milliGRAM(s) Oral daily  LORazepam     Tablet 2 milliGRAM(s) Oral every 2 hours PRN  LORazepam     Tablet 2 milliGRAM(s) Oral every 1 hour PRN  multivitamin 1 Tablet(s) Oral daily  ondansetron Injectable 4 milliGRAM(s) IV Push every 8 hours PRN  pantoprazole    Tablet 40 milliGRAM(s) Oral before breakfast  thiamine 100 milliGRAM(s) Oral daily

## 2022-07-12 NOTE — PATIENT PROFILE ADULT - FALL HARM RISK - HARM RISK INTERVENTIONS
Assistance with ambulation/Assistance OOB with selected safe patient handling equipment/Communicate Risk of Fall with Harm to all staff/Discuss with provider need for PT consult/Monitor for mental status changes/Monitor gait and stability/Provide patient with walking aids - walker, cane, crutches/Reinforce activity limits and safety measures with patient and family/Tailored Fall Risk Interventions/Toileting schedule using arm’s reach rule for commode and bathroom/Use of alarms - bed, chair and/or voice tab/Visual Cue: Yellow wristband and red socks/Bed in lowest position, wheels locked, appropriate side rails in place/Call bell, personal items and telephone in reach/Instruct patient to call for assistance before getting out of bed or chair/Non-slip footwear when patient is out of bed/[place] to call system/Physically safe environment - no spills, clutter or unnecessary equipment/Purposeful Proactive Rounding/Room/bathroom lighting operational, light cord in reach

## 2022-07-12 NOTE — ED PROVIDER NOTE - PHYSICAL EXAMINATION
Gen: Awake, Alert, NAD  Head:  NC/AT  Eyes:  PERRL, EOMI, Conjunctiva pink, lids normal, no scleral icterus  ENT: OP clear, no exudates, moist mucus membranes  Neck: supple, nontender, no meningismus, no JVD, trachea midline  Cardiac/CV:  S1 S2, RRR, no M/G/R  Respiratory/Pulm:  CTAB, good air movement, normal resp effort, no wheezes/stridor/retractions/rales/rhonchi  Gastrointestinal/Abdomen:  Soft, tender epigastric , nondistended, +BS, no rebound/guarding  Back:  no CVAT,  no MLT  Ext:  warm, well perfused, moving all extremities spontaneously, no peripheral edema, distal pulses intact  Skin: intact, no rash  Neuro:  AAOx3, sensation intact, motor 5/5 x 4 extremities, normal gait, speech clear, resting tremor

## 2022-07-13 LAB
ALBUMIN SERPL ELPH-MCNC: 3.3 G/DL — SIGNIFICANT CHANGE UP (ref 3.3–5)
ALP SERPL-CCNC: 83 U/L — SIGNIFICANT CHANGE UP (ref 40–120)
ALT FLD-CCNC: 94 U/L — HIGH (ref 12–78)
ANION GAP SERPL CALC-SCNC: 8 MMOL/L — SIGNIFICANT CHANGE UP (ref 5–17)
AST SERPL-CCNC: 234 U/L — HIGH (ref 15–37)
BASOPHILS # BLD AUTO: 0.01 K/UL — SIGNIFICANT CHANGE UP (ref 0–0.2)
BASOPHILS NFR BLD AUTO: 0.3 % — SIGNIFICANT CHANGE UP (ref 0–2)
BILIRUB SERPL-MCNC: 0.9 MG/DL — SIGNIFICANT CHANGE UP (ref 0.2–1.2)
BUN SERPL-MCNC: 5 MG/DL — LOW (ref 7–23)
CALCIUM SERPL-MCNC: 8.9 MG/DL — SIGNIFICANT CHANGE UP (ref 8.5–10.1)
CHLORIDE SERPL-SCNC: 103 MMOL/L — SIGNIFICANT CHANGE UP (ref 96–108)
CO2 SERPL-SCNC: 29 MMOL/L — SIGNIFICANT CHANGE UP (ref 22–31)
CREAT SERPL-MCNC: 0.72 MG/DL — SIGNIFICANT CHANGE UP (ref 0.5–1.3)
EGFR: 128 ML/MIN/1.73M2 — SIGNIFICANT CHANGE UP
EOSINOPHIL # BLD AUTO: 0.01 K/UL — SIGNIFICANT CHANGE UP (ref 0–0.5)
EOSINOPHIL NFR BLD AUTO: 0.3 % — SIGNIFICANT CHANGE UP (ref 0–6)
GLUCOSE SERPL-MCNC: 91 MG/DL — SIGNIFICANT CHANGE UP (ref 70–99)
HCT VFR BLD CALC: 37.7 % — LOW (ref 39–50)
HGB BLD-MCNC: 13 G/DL — SIGNIFICANT CHANGE UP (ref 13–17)
IMM GRANULOCYTES NFR BLD AUTO: 0.3 % — SIGNIFICANT CHANGE UP (ref 0–1.5)
LYMPHOCYTES # BLD AUTO: 1.56 K/UL — SIGNIFICANT CHANGE UP (ref 1–3.3)
LYMPHOCYTES # BLD AUTO: 53.8 % — HIGH (ref 13–44)
MAGNESIUM SERPL-MCNC: 1.9 MG/DL — SIGNIFICANT CHANGE UP (ref 1.6–2.6)
MCHC RBC-ENTMCNC: 34.5 G/DL — SIGNIFICANT CHANGE UP (ref 32–36)
MCHC RBC-ENTMCNC: 35.6 PG — HIGH (ref 27–34)
MCV RBC AUTO: 103.3 FL — HIGH (ref 80–100)
MONOCYTES # BLD AUTO: 0.31 K/UL — SIGNIFICANT CHANGE UP (ref 0–0.9)
MONOCYTES NFR BLD AUTO: 10.7 % — SIGNIFICANT CHANGE UP (ref 2–14)
NEUTROPHILS # BLD AUTO: 1 K/UL — LOW (ref 1.8–7.4)
NEUTROPHILS NFR BLD AUTO: 34.6 % — LOW (ref 43–77)
NRBC # BLD: 0 /100 WBCS — SIGNIFICANT CHANGE UP (ref 0–0)
PLATELET # BLD AUTO: 116 K/UL — LOW (ref 150–400)
POTASSIUM SERPL-MCNC: 3.1 MMOL/L — LOW (ref 3.5–5.3)
POTASSIUM SERPL-SCNC: 3.1 MMOL/L — LOW (ref 3.5–5.3)
PROT SERPL-MCNC: 6.9 GM/DL — SIGNIFICANT CHANGE UP (ref 6–8.3)
RBC # BLD: 3.65 M/UL — LOW (ref 4.2–5.8)
RBC # FLD: 12.8 % — SIGNIFICANT CHANGE UP (ref 10.3–14.5)
SODIUM SERPL-SCNC: 140 MMOL/L — SIGNIFICANT CHANGE UP (ref 135–145)
WBC # BLD: 2.9 K/UL — LOW (ref 3.8–10.5)
WBC # FLD AUTO: 2.9 K/UL — LOW (ref 3.8–10.5)

## 2022-07-13 PROCEDURE — 99232 SBSQ HOSP IP/OBS MODERATE 35: CPT

## 2022-07-13 RX ORDER — POTASSIUM CHLORIDE 20 MEQ
40 PACKET (EA) ORAL ONCE
Refills: 0 | Status: COMPLETED | OUTPATIENT
Start: 2022-07-13 | End: 2022-07-13

## 2022-07-13 RX ADMIN — Medication 50 MILLIGRAM(S): at 21:46

## 2022-07-13 RX ADMIN — Medication 40 MILLIEQUIVALENT(S): at 11:37

## 2022-07-13 RX ADMIN — Medication 50 MILLIGRAM(S): at 13:09

## 2022-07-13 RX ADMIN — ONDANSETRON 4 MILLIGRAM(S): 8 TABLET, FILM COATED ORAL at 13:09

## 2022-07-13 RX ADMIN — PANTOPRAZOLE SODIUM 40 MILLIGRAM(S): 20 TABLET, DELAYED RELEASE ORAL at 07:42

## 2022-07-13 RX ADMIN — Medication 1 MILLIGRAM(S): at 11:37

## 2022-07-13 RX ADMIN — SODIUM CHLORIDE 100 MILLILITER(S): 9 INJECTION INTRAMUSCULAR; INTRAVENOUS; SUBCUTANEOUS at 07:42

## 2022-07-13 RX ADMIN — Medication 100 MILLIGRAM(S): at 11:37

## 2022-07-13 RX ADMIN — Medication 30 MILLILITER(S): at 01:31

## 2022-07-13 RX ADMIN — ONDANSETRON 4 MILLIGRAM(S): 8 TABLET, FILM COATED ORAL at 01:33

## 2022-07-13 RX ADMIN — Medication 50 MILLIGRAM(S): at 05:56

## 2022-07-13 RX ADMIN — Medication 1 TABLET(S): at 11:37

## 2022-07-13 NOTE — PROGRESS NOTE ADULT - SUBJECTIVE AND OBJECTIVE BOX
Patient is a 26y old  Male who presents with a chief complaint of Acute gastritis, Alcohol withdrawal (2022 13:38)      INTERVAL HPI/ OVERNIGHT EVENTS: Pt was seen and examined at bedside today, No significant overnight events, pt admits to LUQ tenderness, his N/V has resolved, he admits to tremors, denies any pain      MEDICATIONS  (STANDING):  chlordiazePOXIDE   Oral   chlordiazePOXIDE 50 milliGRAM(s) Oral every 8 hours  folic acid 1 milliGRAM(s) Oral daily  multivitamin 1 Tablet(s) Oral daily  pantoprazole    Tablet 40 milliGRAM(s) Oral before breakfast  sodium chloride 0.9%. 1000 milliLiter(s) (100 mL/Hr) IV Continuous <Continuous>  thiamine 100 milliGRAM(s) Oral daily    MEDICATIONS  (PRN):  aluminum hydroxide/magnesium hydroxide/simethicone Suspension 30 milliLiter(s) Oral every 4 hours PRN Dyspepsia  LORazepam     Tablet 1 milliGRAM(s) Oral every 2 hours PRN CIWA-Ar score increase by 2 points and a total score of 7 or less  LORazepam     Tablet 1 milliGRAM(s) Oral every 1 hour PRN CIWA-Ar score 8 or greater  ondansetron Injectable 4 milliGRAM(s) IV Push every 8 hours PRN Nausea and/or Vomiting      Allergies    No Known Drug Allergies  Tomatoes (Short breath)    Intolerances        REVIEW OF SYSTEMS:    Unable to examine due to [ ] Encephalopathy [ ] Advanced Dementia [ ] Expressive Aphasia [ ] Non-verbal patient    CONSTITUTIONAL: No fever, NO generalized weakness/Fatigue, No weight loss  EYES: No eye pain, visual disturbances, or discharge  ENMT:  No difficulty hearing, tinnitus, vertigo; No sinus or throat pain  NECK: No pain or stiffness  RESPIRATORY: No shortness of breath,  cough, wheezing, sputum or hemoptysis   CARDIOVASCULAR: No chest pain, palpitations, or leg swelling  GASTROINTESTINAL: No abdominal pain. No nausea, vomiting, diarrhea or constipation. No melena or hematochezia.  GENITOURINARY: No dysuria, frequency, hematuria, or incontinence  NEUROLOGICAL: positive tremors, No headaches, Dizziness, memory loss, loss of strength, numbness  SKIN: No itching, burning, rashes, or lesions   MUSCULOSKELETAL: No joint pain or swelling; No muscle, back, or extremity pain  PSYCHIATRIC: No depression, anxiety, mood swings, or difficulty sleeping  HEME/LYMPH: No easy bruising, or bleeding gums      Vital Signs Last 24 Hrs  T(C): 37.2 (2022 12:38), Max: 37.4 (2022 23:15)  T(F): 98.9 (2022 12:38), Max: 99.4 (2022 23:15)  HR: 76 (2022 12:38) (70 - 89)  BP: 140/78 (2022 12:38) (135/92 - 147/83)  BP(mean): --  RR: 18 (2022 12:38) (18 - 19)  SpO2: 97% (2022 05:01) (95% - 98%)    Parameters below as of 2022 12:38  Patient On (Oxygen Delivery Method): room air        PHYSICAL EXAM:  GENERAL: NAD, well-developed, well-groomed  HEAD:  Atraumatic, Normocephalic  EYES: conjunctiva and sclera clear  ENMT: Moist mucous membranes  NECK: Supple, No JVD, Normal thyroid  CHEST/LUNG: Clear to Auscultation bilaterally; No rales, rhonchi, wheezing, or rubs  HEART: Regular rate and rhythm; No murmurs, rubs, or gallops  ABDOMEN: Soft, Nontender, Nondistended; Bowel sounds present  EXTREMITIES:  2+ Peripheral Pulses, No clubbing, cyanosis, or edema  SKIN: No rashes or lesions  NERVOUS SYSTEM:  Alert & Oriented X3, Good concentration; mild tremors, Motor Strength 5/5 B/L upper and lower extremities    LABS:                        13.0   2.90  )-----------( 116      ( 2022 07:38 )             37.7     07-13    140  |  103  |  5<L>  ----------------------------<  91  3.1<L>   |  29  |  0.72    Ca    8.9      2022 07:38  Mg     1.9     07-13    TPro  6.9  /  Alb  3.3  /  TBili  0.9  /  DBili  x   /  AST  234<H>  /  ALT  94<H>  /  AlkPhos  83  07-13      Urinalysis Basic - ( 2022 20:12 )    Color: Yellow / Appearance: Clear / S.010 / pH: x  Gluc: x / Ketone: Small  / Bili: Negative / Urobili: 4 mg/dL   Blood: x / Protein: 15 mg/dL / Nitrite: Negative   Leuk Esterase: Negative / RBC: 0-2 /HPF / WBC 0-2   Sq Epi: x / Non Sq Epi: Occasional / Bacteria: Occasional      CAPILLARY BLOOD GLUCOSE              RADIOLOGY & ADDITIONAL TESTS:          Imaging Personally Reviewed:  [ ] YES  [ ] NO    Consultant(s) Notes Reviewed:  [ ] YES  [ ] NO    Care Discussed with Consultants/Other Providers [x ] YES  [ ] NO Patient is a 26y old  Male who presents with a chief complaint of Acute gastritis, Alcohol withdrawal (2022 13:38)      INTERVAL HPI/ OVERNIGHT EVENTS: Pt was seen and examined at bedside today, No significant overnight events, pt admits to LUQ tenderness, his N/V has resolved, he admits to tremors, denies any pain      MEDICATIONS  (STANDING):  chlordiazePOXIDE   Oral   chlordiazePOXIDE 50 milliGRAM(s) Oral every 8 hours  folic acid 1 milliGRAM(s) Oral daily  multivitamin 1 Tablet(s) Oral daily  pantoprazole    Tablet 40 milliGRAM(s) Oral before breakfast  sodium chloride 0.9%. 1000 milliLiter(s) (100 mL/Hr) IV Continuous <Continuous>  thiamine 100 milliGRAM(s) Oral daily    MEDICATIONS  (PRN):  aluminum hydroxide/magnesium hydroxide/simethicone Suspension 30 milliLiter(s) Oral every 4 hours PRN Dyspepsia  LORazepam     Tablet 1 milliGRAM(s) Oral every 2 hours PRN CIWA-Ar score increase by 2 points and a total score of 7 or less  LORazepam     Tablet 1 milliGRAM(s) Oral every 1 hour PRN CIWA-Ar score 8 or greater  ondansetron Injectable 4 milliGRAM(s) IV Push every 8 hours PRN Nausea and/or Vomiting      Allergies    No Known Drug Allergies  Tomatoes (Short breath)    Intolerances        REVIEW OF SYSTEMS:    Unable to examine due to [ ] Encephalopathy [ ] Advanced Dementia [ ] Expressive Aphasia [ ] Non-verbal patient    CONSTITUTIONAL: No fever, NO generalized weakness/Fatigue, No weight loss  EYES: No eye pain, visual disturbances, or discharge  ENMT:  No difficulty hearing, tinnitus, vertigo; No sinus or throat pain  NECK: No pain or stiffness  RESPIRATORY: No shortness of breath,  cough, wheezing, sputum or hemoptysis   CARDIOVASCULAR: No chest pain, palpitations, or leg swelling  GASTROINTESTINAL: +LUQ abdominal pain. No nausea, vomiting, diarrhea or constipation. No melena or hematochezia.  GENITOURINARY: No dysuria, frequency, hematuria, or incontinence  NEUROLOGICAL: positive tremors, No headaches, Dizziness, memory loss, loss of strength, numbness  SKIN: No itching, burning, rashes, or lesions   MUSCULOSKELETAL: No joint pain or swelling; No muscle, back, or extremity pain  PSYCHIATRIC: No depression, anxiety, mood swings, or difficulty sleeping  HEME/LYMPH: No easy bruising, or bleeding gums      Vital Signs Last 24 Hrs  T(C): 37.2 (2022 12:38), Max: 37.4 (2022 23:15)  T(F): 98.9 (2022 12:38), Max: 99.4 (2022 23:15)  HR: 76 (2022 12:38) (70 - 89)  BP: 140/78 (2022 12:38) (135/92 - 147/83)  BP(mean): --  RR: 18 (2022 12:38) (18 - 19)  SpO2: 97% (2022 05:01) (95% - 98%)    Parameters below as of 2022 12:38  Patient On (Oxygen Delivery Method): room air        PHYSICAL EXAM:  GENERAL: NAD, well-developed, well-groomed  HEAD:  Atraumatic, Normocephalic  EYES: conjunctiva and sclera clear  ENMT: Moist mucous membranes  NECK: Supple, No JVD, Normal thyroid  CHEST/LUNG: Clear to Auscultation bilaterally; No rales, rhonchi, wheezing, or rubs  HEART: Regular rate and rhythm; No murmurs, rubs, or gallops  ABDOMEN: Soft, + tenderness LUQ, Nondistended; Bowel sounds present  EXTREMITIES:  2+ Peripheral Pulses, No clubbing, cyanosis, or edema  SKIN: No rashes or lesions  NERVOUS SYSTEM:  Alert & Oriented X3, Good concentration; mild tremors, Motor Strength 5/5 B/L upper and lower extremities    LABS:                        13.0   2.90  )-----------( 116      ( 2022 07:38 )             37.7     07-13    140  |  103  |  5<L>  ----------------------------<  91  3.1<L>   |  29  |  0.72    Ca    8.9      2022 07:38  Mg     1.9     07-13    TPro  6.9  /  Alb  3.3  /  TBili  0.9  /  DBili  x   /  AST  234<H>  /  ALT  94<H>  /  AlkPhos  83  07-13      Urinalysis Basic - ( 2022 20:12 )    Color: Yellow / Appearance: Clear / S.010 / pH: x  Gluc: x / Ketone: Small  / Bili: Negative / Urobili: 4 mg/dL   Blood: x / Protein: 15 mg/dL / Nitrite: Negative   Leuk Esterase: Negative / RBC: 0-2 /HPF / WBC 0-2   Sq Epi: x / Non Sq Epi: Occasional / Bacteria: Occasional      CAPILLARY BLOOD GLUCOSE              RADIOLOGY & ADDITIONAL TESTS:          Imaging Personally Reviewed:  [ ] YES  [ ] NO    Consultant(s) Notes Reviewed:  [ ] YES  [ ] NO    Care Discussed with Consultants/Other Providers [x ] YES  [ ] NO
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
## 2022-07-14 PROCEDURE — 99232 SBSQ HOSP IP/OBS MODERATE 35: CPT

## 2022-07-14 RX ORDER — LANOLIN ALCOHOL/MO/W.PET/CERES
3 CREAM (GRAM) TOPICAL AT BEDTIME
Refills: 0 | Status: DISCONTINUED | OUTPATIENT
Start: 2022-07-14 | End: 2022-07-15

## 2022-07-14 RX ADMIN — Medication 1 MILLIGRAM(S): at 12:28

## 2022-07-14 RX ADMIN — Medication 1 MILLIGRAM(S): at 00:22

## 2022-07-14 RX ADMIN — Medication 1 TABLET(S): at 12:27

## 2022-07-14 RX ADMIN — PANTOPRAZOLE SODIUM 40 MILLIGRAM(S): 20 TABLET, DELAYED RELEASE ORAL at 07:49

## 2022-07-14 RX ADMIN — Medication 50 MILLIGRAM(S): at 05:27

## 2022-07-14 RX ADMIN — Medication 30 MILLILITER(S): at 02:25

## 2022-07-14 RX ADMIN — Medication 50 MILLIGRAM(S): at 18:21

## 2022-07-14 RX ADMIN — Medication 3 MILLIGRAM(S): at 21:15

## 2022-07-14 RX ADMIN — Medication 100 MILLIGRAM(S): at 12:27

## 2022-07-14 RX ADMIN — SODIUM CHLORIDE 100 MILLILITER(S): 9 INJECTION INTRAMUSCULAR; INTRAVENOUS; SUBCUTANEOUS at 07:07

## 2022-07-14 RX ADMIN — SODIUM CHLORIDE 100 MILLILITER(S): 9 INJECTION INTRAMUSCULAR; INTRAVENOUS; SUBCUTANEOUS at 23:56

## 2022-07-14 NOTE — PROGRESS NOTE ADULT - SUBJECTIVE AND OBJECTIVE BOX
Patient is a 26y old  Male who presents with a chief complaint of Acute gastritis, Alcohol withdrawal (2022 15:33)      INTERVAL HPI/ OVERNIGHT EVENTS: Pt was seen and examined at bedside today, No significant overnight events, pt admits that his tremors and abdominal pain are improving, he denies any other acute complaints.      MEDICATIONS  (STANDING):  chlordiazePOXIDE   Oral   chlordiazePOXIDE 50 milliGRAM(s) Oral every 12 hours  folic acid 1 milliGRAM(s) Oral daily  multivitamin 1 Tablet(s) Oral daily  pantoprazole    Tablet 40 milliGRAM(s) Oral before breakfast  sodium chloride 0.9%. 1000 milliLiter(s) (100 mL/Hr) IV Continuous <Continuous>  thiamine 100 milliGRAM(s) Oral daily    MEDICATIONS  (PRN):  aluminum hydroxide/magnesium hydroxide/simethicone Suspension 30 milliLiter(s) Oral every 4 hours PRN Dyspepsia  LORazepam     Tablet 1 milliGRAM(s) Oral every 2 hours PRN CIWA-Ar score increase by 2 points and a total score of 7 or less  LORazepam     Tablet 1 milliGRAM(s) Oral every 1 hour PRN CIWA-Ar score 8 or greater  ondansetron Injectable 4 milliGRAM(s) IV Push every 8 hours PRN Nausea and/or Vomiting      Allergies    No Known Drug Allergies  Tomatoes (Short breath)    Intolerances        REVIEW OF SYSTEMS:    Unable to examine due to [ ] Encephalopathy [ ] Advanced Dementia [ ] Expressive Aphasia [ ] Non-verbal patient    CONSTITUTIONAL: No fever, NO generalized weakness/Fatigue, No weight loss  EYES: No eye pain, visual disturbances, or discharge  ENMT:  No difficulty hearing, tinnitus, vertigo; No sinus or throat pain  NECK: No pain or stiffness  RESPIRATORY: No shortness of breath,  cough, wheezing, sputum or hemoptysis   CARDIOVASCULAR: No chest pain, palpitations, or leg swelling  GASTROINTESTINAL: +LUQ abdominal pain. No nausea, vomiting, diarrhea or constipation. No melena or hematochezia.  GENITOURINARY: No dysuria, frequency, hematuria, or incontinence  NEUROLOGICAL: positive tremors, No headaches, Dizziness, memory loss, loss of strength, numbness  SKIN: No itching, burning, rashes, or lesions   MUSCULOSKELETAL: No joint pain or swelling; No muscle, back, or extremity pain  PSYCHIATRIC: No depression, anxiety, mood swings, or difficulty sleeping  HEME/LYMPH: No easy bruising, or bleeding gums      Vital Signs Last 24 Hrs  T(C): 36.8 (2022 16:09), Max: 37 (2022 23:31)  T(F): 98.3 (2022 16:09), Max: 98.6 (2022 23:31)  HR: 64 (2022 16:09) (60 - 74)  BP: 134/83 (2022 16:09) (120/81 - 143/95)  BP(mean): --  RR: 18 (2022 16:09) (18 - 19)  SpO2: 99% (2022 16:09) (99% - 99%)    Parameters below as of 2022 05:07  Patient On (Oxygen Delivery Method): room air        PHYSICAL EXAM:  GENERAL: NAD, well-developed, well-groomed  HEAD:  Atraumatic, Normocephalic  EYES: conjunctiva and sclera clear  ENMT: Moist mucous membranes  NECK: Supple, No JVD, Normal thyroid  CHEST/LUNG: Clear to Auscultation bilaterally; No rales, rhonchi, wheezing, or rubs  HEART: Regular rate and rhythm; No murmurs, rubs, or gallops  ABDOMEN: Soft, Nontender, Nondistended; Bowel sounds present  EXTREMITIES:  2+ Peripheral Pulses, No clubbing, cyanosis, or edema  SKIN: No rashes or lesions  NERVOUS SYSTEM:  Alert & Oriented X3, Good concentration; Motor Strength 5/5 B/L upper and lower extremities    LABS:                        13.0   2.90  )-----------( 116      ( 2022 07:38 )             37.7     07-13    140  |  103  |  5<L>  ----------------------------<  91  3.1<L>   |  29  |  0.72    Ca    8.9      2022 07:38  Mg     1.9     07-13    TPro  6.9  /  Alb  3.3  /  TBili  0.9  /  DBili  x   /  AST  234<H>  /  ALT  94<H>  /  AlkPhos  83  07-13      Urinalysis Basic - ( 2022 20:12 )    Color: Yellow / Appearance: Clear / S.010 / pH: x  Gluc: x / Ketone: Small  / Bili: Negative / Urobili: 4 mg/dL   Blood: x / Protein: 15 mg/dL / Nitrite: Negative   Leuk Esterase: Negative / RBC: 0-2 /HPF / WBC 0-2   Sq Epi: x / Non Sq Epi: Occasional / Bacteria: Occasional      CAPILLARY BLOOD GLUCOSE              RADIOLOGY & ADDITIONAL TESTS:          Imaging Personally Reviewed:  [ ] YES  [ ] NO    Consultant(s) Notes Reviewed:  [ ] YES  [ ] NO    Care Discussed with Consultants/Other Providers [x ] YES  [ ] NO
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
## 2022-07-15 VITALS
DIASTOLIC BLOOD PRESSURE: 82 MMHG | OXYGEN SATURATION: 99 % | TEMPERATURE: 98 F | SYSTOLIC BLOOD PRESSURE: 132 MMHG | HEART RATE: 78 BPM | RESPIRATION RATE: 17 BRPM

## 2022-07-15 LAB
ALBUMIN SERPL ELPH-MCNC: 3 G/DL — LOW (ref 3.3–5)
ALP SERPL-CCNC: 81 U/L — SIGNIFICANT CHANGE UP (ref 40–120)
ALT FLD-CCNC: 110 U/L — HIGH (ref 12–78)
ANION GAP SERPL CALC-SCNC: 8 MMOL/L — SIGNIFICANT CHANGE UP (ref 5–17)
AST SERPL-CCNC: 238 U/L — HIGH (ref 15–37)
BILIRUB SERPL-MCNC: 0.6 MG/DL — SIGNIFICANT CHANGE UP (ref 0.2–1.2)
BUN SERPL-MCNC: 9 MG/DL — SIGNIFICANT CHANGE UP (ref 7–23)
CALCIUM SERPL-MCNC: 8.8 MG/DL — SIGNIFICANT CHANGE UP (ref 8.5–10.1)
CHLORIDE SERPL-SCNC: 108 MMOL/L — SIGNIFICANT CHANGE UP (ref 96–108)
CO2 SERPL-SCNC: 27 MMOL/L — SIGNIFICANT CHANGE UP (ref 22–31)
CREAT SERPL-MCNC: 0.8 MG/DL — SIGNIFICANT CHANGE UP (ref 0.5–1.3)
EGFR: 124 ML/MIN/1.73M2 — SIGNIFICANT CHANGE UP
GLUCOSE SERPL-MCNC: 91 MG/DL — SIGNIFICANT CHANGE UP (ref 70–99)
HCT VFR BLD CALC: 35.8 % — LOW (ref 39–50)
HGB BLD-MCNC: 12.2 G/DL — LOW (ref 13–17)
MCHC RBC-ENTMCNC: 34.1 G/DL — SIGNIFICANT CHANGE UP (ref 32–36)
MCHC RBC-ENTMCNC: 35.7 PG — HIGH (ref 27–34)
MCV RBC AUTO: 104.7 FL — HIGH (ref 80–100)
NRBC # BLD: 0 /100 WBCS — SIGNIFICANT CHANGE UP (ref 0–0)
PLATELET # BLD AUTO: 100 K/UL — LOW (ref 150–400)
POTASSIUM SERPL-MCNC: 3.7 MMOL/L — SIGNIFICANT CHANGE UP (ref 3.5–5.3)
POTASSIUM SERPL-SCNC: 3.7 MMOL/L — SIGNIFICANT CHANGE UP (ref 3.5–5.3)
PROT SERPL-MCNC: 6.3 GM/DL — SIGNIFICANT CHANGE UP (ref 6–8.3)
RBC # BLD: 3.42 M/UL — LOW (ref 4.2–5.8)
RBC # FLD: 12.7 % — SIGNIFICANT CHANGE UP (ref 10.3–14.5)
SODIUM SERPL-SCNC: 143 MMOL/L — SIGNIFICANT CHANGE UP (ref 135–145)
VIT B12 SERPL-MCNC: 484 PG/ML — SIGNIFICANT CHANGE UP (ref 232–1245)
WBC # BLD: 3.45 K/UL — LOW (ref 3.8–10.5)
WBC # FLD AUTO: 3.45 K/UL — LOW (ref 3.8–10.5)

## 2022-07-15 PROCEDURE — 99239 HOSP IP/OBS DSCHRG MGMT >30: CPT

## 2022-07-15 RX ORDER — FOLIC ACID 0.8 MG
1 TABLET ORAL
Qty: 30 | Refills: 0
Start: 2022-07-15 | End: 2022-08-13

## 2022-07-15 RX ORDER — PANTOPRAZOLE SODIUM 20 MG/1
40 TABLET, DELAYED RELEASE ORAL
Qty: 30 | Refills: 0
Start: 2022-07-15 | End: 2022-08-13

## 2022-07-15 RX ORDER — POTASSIUM CHLORIDE 20 MEQ
40 PACKET (EA) ORAL ONCE
Refills: 0 | Status: COMPLETED | OUTPATIENT
Start: 2022-07-15 | End: 2022-07-15

## 2022-07-15 RX ORDER — THIAMINE MONONITRATE (VIT B1) 100 MG
1 TABLET ORAL
Qty: 30 | Refills: 0
Start: 2022-07-15 | End: 2022-08-13

## 2022-07-15 RX ADMIN — Medication 100 MILLIGRAM(S): at 12:32

## 2022-07-15 RX ADMIN — Medication 1 TABLET(S): at 12:31

## 2022-07-15 RX ADMIN — Medication 1 MILLIGRAM(S): at 12:31

## 2022-07-15 RX ADMIN — Medication 50 MILLIGRAM(S): at 06:05

## 2022-07-15 RX ADMIN — PANTOPRAZOLE SODIUM 40 MILLIGRAM(S): 20 TABLET, DELAYED RELEASE ORAL at 08:03

## 2022-07-15 RX ADMIN — Medication 40 MILLIEQUIVALENT(S): at 16:08

## 2022-07-15 NOTE — DISCHARGE NOTE PROVIDER - NSDCMRMEDTOKEN_GEN_ALL_CORE_FT
folic acid 1 mg oral tablet: 1 tab(s) orally once a day  Multiple Vitamins oral tablet: 1 tab(s) orally once a day  Protonix 40 mg oral delayed release tablet: 40 tab(s) orally once a day   thiamine 100 mg oral tablet: 1 tab(s) orally once a day   aluminum hydroxide-magnesium hydroxide 200 mg-200 mg/5 mL oral suspension: 30 milliliter(s) orally every 4 hours, As needed, Dyspepsia  folic acid 1 mg oral tablet: 1 tab(s) orally once a day  Multiple Vitamins oral tablet: 1 tab(s) orally once a day  Protonix 40 mg oral delayed release tablet: 40 tab(s) orally once a day   thiamine 100 mg oral tablet: 1 tab(s) orally once a day

## 2022-07-15 NOTE — DISCHARGE NOTE PROVIDER - ATTENDING DISCHARGE PHYSICAL EXAMINATION:
GENERAL: NAD, well-developed, well-groomed  HEAD:  Atraumatic, Normocephalic  EYES: conjunctiva and sclera clear  ENMT: Moist mucous membranes  NECK: Supple, No JVD, Normal thyroid  CHEST/LUNG: Clear to Auscultation bilaterally; No rales, rhonchi, wheezing, or rubs  HEART: Regular rate and rhythm; No murmurs, rubs, or gallops  ABDOMEN: Soft, Nontender, Nondistended; Bowel sounds present  EXTREMITIES:  2+ Peripheral Pulses, No clubbing, cyanosis, or edema  SKIN: No rashes or lesions  NERVOUS SYSTEM:  Alert & Oriented X3, Good concentration; Motor Strength 5/5 B/L upper and lower extremities

## 2022-07-15 NOTE — DISCHARGE NOTE NURSING/CASE MANAGEMENT/SOCIAL WORK - PATIENT PORTAL LINK FT
You can access the FollowMyHealth Patient Portal offered by [facility] by registering at the following website: [email or web address]. By joining [facility]’s FollowMyHealth portal, you will also be able to view your health information using other applications (apps) compatible with our system.
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
## 2022-07-15 NOTE — DISCHARGE NOTE PROVIDER - NSDCCPCAREPLAN_GEN_ALL_CORE_FT
PRINCIPAL DISCHARGE DIAGNOSIS  Diagnosis: Alcohol withdrawal  Assessment and Plan of Treatment: Alcohol withdrawal/ Gastritis  - ETOH intoxication: POA;  ETOH level 228  - status post Librium CIWA protocol   - continue with Folic and Thiamine for presumed deficiency   - Presumed ETOH Gastritis; N/V has resolved   - continue with Protonix   Refrain from all Alcohol products.   Electrolyte Imbalance:   HypoKalemia- repleted      SECONDARY DISCHARGE DIAGNOSES  Diagnosis: Abdominal pain  Assessment and Plan of Treatment: continue with Protonix and Maalox as needed    Diagnosis: Abnormal transaminases  Assessment and Plan of Treatment: Most likely secondary to Alcohol abuse   You are at risk for liver failure   Avoid all Alcohol products.    Diagnosis: Sickle cell disease  Assessment and Plan of Treatment: follow up with PCP for management    Diagnosis: Hypokalemia  Assessment and Plan of Treatment: replaced

## 2022-07-20 ENCOUNTER — EMERGENCY (EMERGENCY)
Facility: HOSPITAL | Age: 27
LOS: 0 days | Discharge: HOME | End: 2022-07-20
Attending: EMERGENCY MEDICINE

## 2022-07-20 VITALS
TEMPERATURE: 99 F | DIASTOLIC BLOOD PRESSURE: 85 MMHG | HEIGHT: 68 IN | HEART RATE: 95 BPM | RESPIRATION RATE: 16 BRPM | OXYGEN SATURATION: 98 % | WEIGHT: 179.9 LBS | SYSTOLIC BLOOD PRESSURE: 123 MMHG

## 2022-07-20 VITALS — HEART RATE: 98 BPM

## 2022-07-20 DIAGNOSIS — Z91.018 ALLERGY TO OTHER FOODS: ICD-10-CM

## 2022-07-20 DIAGNOSIS — S01.01XA LACERATION WITHOUT FOREIGN BODY OF SCALP, INITIAL ENCOUNTER: Chronic | ICD-10-CM

## 2022-07-20 DIAGNOSIS — L02.211 CUTANEOUS ABSCESS OF ABDOMINAL WALL: ICD-10-CM

## 2022-07-20 DIAGNOSIS — Z87.19 PERSONAL HISTORY OF OTHER DISEASES OF THE DIGESTIVE SYSTEM: ICD-10-CM

## 2022-07-20 DIAGNOSIS — F10.20 ALCOHOL DEPENDENCE, UNCOMPLICATED: ICD-10-CM

## 2022-07-20 PROBLEM — D57.1 SICKLE-CELL DISEASE WITHOUT CRISIS: Chronic | Status: ACTIVE | Noted: 2022-07-12

## 2022-07-20 PROCEDURE — 99283 EMERGENCY DEPT VISIT LOW MDM: CPT | Mod: 25

## 2022-07-20 PROCEDURE — 10060 I&D ABSCESS SIMPLE/SINGLE: CPT

## 2022-07-20 RX ORDER — PANTOPRAZOLE SODIUM 20 MG/1
40 TABLET, DELAYED RELEASE ORAL ONCE
Refills: 0 | Status: COMPLETED | OUTPATIENT
Start: 2022-07-20 | End: 2022-07-20

## 2022-07-20 RX ORDER — AZTREONAM 2 G
1 VIAL (EA) INJECTION
Qty: 20 | Refills: 0
Start: 2022-07-20 | End: 2022-07-29

## 2022-07-20 RX ADMIN — PANTOPRAZOLE SODIUM 40 MILLIGRAM(S): 20 TABLET, DELAYED RELEASE ORAL at 08:30

## 2022-07-20 RX ADMIN — Medication 1 TABLET(S): at 08:31
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
## 2022-07-20 NOTE — ED ADULT NURSE NOTE - OBJECTIVE STATEMENT
AAOx3 Patient BIBA w/ PMH of alcohol abuse reports drinking alcohol from 8am 7/19 - 2am 7/20 before arriving to ED. Pt reports no AAOx3 Patient BIBA w/ PM of alcohol abuse reports drinking alcohol from 8am 7/19 - 2am 7/20 before arriving to ED unable to remember how much he drank exactly. Pt states that he "drinks 1 gallon" of alcohol per day". Pt reports not remembering arriving to the ED. Pt has a large abcess on the right hip/glute, patient does not remember how or when that appeared. Patient denies falling. Doctor aware of abcess plan is to attempt to drain it and treat with ABX. AAOx3 Patient BIBA w/ PMH of alcohol abuse reports drinking alcohol from 8am 7/19 - 2am 7/20 before arriving to ED unable to remember how much he drank exactly. Pt states that he "drinks 1 gallon" of alcohol per day". Pt reports not remembering arriving to the ED. Pt has a large bruise on the right hip/glute, patient does not remember how or when that appeared. Patient denies falling. Doctor aware of bruise and went to assess it. No signs of distress, tremors or withdrawl. Pt denies cp/sob. AAOx3 Patient BIBA w/ PMH of alcohol abuse reports drinking alcohol from 8am 7/19 - 2am 7/20 before arriving to ED unable to remember how much he drank exactly. Pt states that he "drinks 1 gallon" of alcohol per day". Pt reports not remembering arriving to the ED. Pt has a large raised reddened area on the right hip/glute, patient does not remember how or when that appeared. Patient denies falling. Doctor aware of reddened area and went to assess it. No signs of distress, tremors or withdrawl. Pt denies cp/sob.

## 2022-07-20 NOTE — ED PROVIDER NOTE - SKIN, MLM
Skin normal color for race, warm, dry and intact. discoloration to the left flank, 14 cm diameter, not hot, no fluctuance, looks old, cellulitis vs ecchymosis, the area is not tender.

## 2022-07-20 NOTE — ED PROVIDER NOTE - CLINICAL SUMMARY MEDICAL DECISION MAKING FREE TEXT BOX
Pt with alcohol abuse, rested here, will dc to follow up with pmd. Asking for a tray of food, using crash cart to charge his cell phone and he is ambulatory, no suicidal or homicidal ideation. Precautions reviewed.

## 2022-07-20 NOTE — SBIRT NOTE ADULT - NSSBIRTALCPASSREFTXDET_GEN_A_CORE
Provided SBIRT services: Full screen positive. Referral to Treatment attempted. Screening results were reviewed with the patient and patient was provided information about healthy guidelines and potential negative consequences associated with level of risk. Motivation and readiness to reduce or stop use was discussed and goals and activities to make changes were suggested/offered.  Options discussed for further evaluation and treatment, but referral to treatment was not completed because: Patient refused- will only detox at [facility], not willing to discuss other treatment options. States that if he can't detox here, he doesn't want support.

## 2022-07-20 NOTE — ED ADULT TRIAGE NOTE - NSWEIGHTCALCTOOLDRUG_GEN_A_CORE
Phoned pt, left detailed VM with pathology result: endometrial polyp. Pt to call if she has concerns re: pelvic pain, fever, bleeding. Rec PNV as she is planning pregnancy.    used

## 2022-07-20 NOTE — ED PROVIDER NOTE - PATIENT PORTAL LINK FT
You can access the FollowMyHealth Patient Portal offered by [facility] by registering at the following website: [email or web address]. By joining [facility]’s FollowMyHealth portal, you will also be able to view your health information using other applications (apps) compatible with our system.

## 2022-07-21 DIAGNOSIS — Z91.018 ALLERGY TO OTHER FOODS: ICD-10-CM

## 2022-07-21 DIAGNOSIS — F10.139 ALCOHOL ABUSE WITH WITHDRAWAL, UNSPECIFIED: ICD-10-CM

## 2022-07-21 DIAGNOSIS — R11.2 NAUSEA WITH VOMITING, UNSPECIFIED: ICD-10-CM

## 2022-07-21 DIAGNOSIS — E87.6 HYPOKALEMIA: ICD-10-CM

## 2022-07-21 DIAGNOSIS — F10.129 ALCOHOL ABUSE WITH INTOXICATION, UNSPECIFIED: ICD-10-CM

## 2022-07-21 DIAGNOSIS — K29.20 ALCOHOLIC GASTRITIS WITHOUT BLEEDING: ICD-10-CM

## 2022-07-21 DIAGNOSIS — E87.2 ACIDOSIS: ICD-10-CM

## 2022-07-21 DIAGNOSIS — R74.01 ELEVATION OF LEVELS OF LIVER TRANSAMINASE LEVELS: ICD-10-CM
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
## 2022-07-21 DIAGNOSIS — Z87.828 PERSONAL HISTORY OF OTHER (HEALED) PHYSICAL INJURY AND TRAUMA: ICD-10-CM

## 2022-07-21 DIAGNOSIS — D57.1 SICKLE-CELL DISEASE WITHOUT CRISIS: ICD-10-CM

## 2022-08-03 ENCOUNTER — INPATIENT (INPATIENT)
Facility: HOSPITAL | Age: 27
LOS: 1 days | Discharge: LEFT BEFORE DISCHARGE | End: 2022-08-05
Attending: INTERNAL MEDICINE | Admitting: INTERNAL MEDICINE

## 2022-08-03 VITALS
TEMPERATURE: 98 F | WEIGHT: 186.95 LBS | DIASTOLIC BLOOD PRESSURE: 81 MMHG | HEIGHT: 68 IN | RESPIRATION RATE: 17 BRPM | SYSTOLIC BLOOD PRESSURE: 111 MMHG | HEART RATE: 98 BPM | OXYGEN SATURATION: 96 %

## 2022-08-03 DIAGNOSIS — S01.01XA LACERATION WITHOUT FOREIGN BODY OF SCALP, INITIAL ENCOUNTER: Chronic | ICD-10-CM

## 2022-08-03 LAB
ACETONE SERPL-MCNC: ABNORMAL
ALBUMIN SERPL ELPH-MCNC: 4 G/DL — SIGNIFICANT CHANGE UP (ref 3.3–5)
ALP SERPL-CCNC: 99 U/L — SIGNIFICANT CHANGE UP (ref 40–120)
ALT FLD-CCNC: 221 U/L — HIGH (ref 12–78)
ANION GAP SERPL CALC-SCNC: 13 MMOL/L — SIGNIFICANT CHANGE UP (ref 5–17)
APTT BLD: 28 SEC — SIGNIFICANT CHANGE UP (ref 27.5–35.5)
AST SERPL-CCNC: 415 U/L — HIGH (ref 15–37)
BILIRUB SERPL-MCNC: 0.9 MG/DL — SIGNIFICANT CHANGE UP (ref 0.2–1.2)
BUN SERPL-MCNC: 11 MG/DL — SIGNIFICANT CHANGE UP (ref 7–23)
CALCIUM SERPL-MCNC: 8.9 MG/DL — SIGNIFICANT CHANGE UP (ref 8.5–10.1)
CHLORIDE SERPL-SCNC: 97 MMOL/L — SIGNIFICANT CHANGE UP (ref 96–108)
CO2 SERPL-SCNC: 29 MMOL/L — SIGNIFICANT CHANGE UP (ref 22–31)
CREAT SERPL-MCNC: 0.82 MG/DL — SIGNIFICANT CHANGE UP (ref 0.5–1.3)
EGFR: 123 ML/MIN/1.73M2 — SIGNIFICANT CHANGE UP
ETHANOL SERPL-MCNC: 292 MG/DL — HIGH (ref 0–10)
GLUCOSE SERPL-MCNC: 63 MG/DL — LOW (ref 70–99)
INR BLD: 0.98 RATIO — SIGNIFICANT CHANGE UP (ref 0.88–1.16)
LIDOCAIN IGE QN: 147 U/L — SIGNIFICANT CHANGE UP (ref 73–393)
MAGNESIUM SERPL-MCNC: 2.3 MG/DL — SIGNIFICANT CHANGE UP (ref 1.6–2.6)
POTASSIUM SERPL-MCNC: 3.1 MMOL/L — LOW (ref 3.5–5.3)
POTASSIUM SERPL-SCNC: 3.1 MMOL/L — LOW (ref 3.5–5.3)
PROT SERPL-MCNC: 8.8 GM/DL — HIGH (ref 6–8.3)
PROTHROM AB SERPL-ACNC: 11.7 SEC — SIGNIFICANT CHANGE UP (ref 10.5–13.4)
SODIUM SERPL-SCNC: 139 MMOL/L — SIGNIFICANT CHANGE UP (ref 135–145)
TROPONIN I, HIGH SENSITIVITY RESULT: 8.9 NG/L — SIGNIFICANT CHANGE UP

## 2022-08-03 PROCEDURE — 99285 EMERGENCY DEPT VISIT HI MDM: CPT

## 2022-08-03 PROCEDURE — 93010 ELECTROCARDIOGRAM REPORT: CPT

## 2022-08-03 RX ORDER — FOLIC ACID 0.8 MG
1 TABLET ORAL ONCE
Refills: 0 | Status: COMPLETED | OUTPATIENT
Start: 2022-08-03 | End: 2022-08-03

## 2022-08-03 RX ORDER — SODIUM CHLORIDE 9 MG/ML
1000 INJECTION, SOLUTION INTRAVENOUS ONCE
Refills: 0 | Status: COMPLETED | OUTPATIENT
Start: 2022-08-03 | End: 2022-08-03

## 2022-08-03 RX ORDER — THIAMINE MONONITRATE (VIT B1) 100 MG
100 TABLET ORAL ONCE
Refills: 0 | Status: COMPLETED | OUTPATIENT
Start: 2022-08-03 | End: 2022-08-03

## 2022-08-03 RX ORDER — THIAMINE MONONITRATE (VIT B1) 100 MG
100 TABLET ORAL ONCE
Refills: 0 | Status: DISCONTINUED | OUTPATIENT
Start: 2022-08-03 | End: 2022-08-03

## 2022-08-03 RX ORDER — FAMOTIDINE 10 MG/ML
20 INJECTION INTRAVENOUS ONCE
Refills: 0 | Status: COMPLETED | OUTPATIENT
Start: 2022-08-03 | End: 2022-08-03

## 2022-08-03 RX ORDER — METOCLOPRAMIDE HCL 10 MG
10 TABLET ORAL ONCE
Refills: 0 | Status: COMPLETED | OUTPATIENT
Start: 2022-08-03 | End: 2022-08-03

## 2022-08-03 RX ORDER — FOLIC ACID 0.8 MG
1 TABLET ORAL ONCE
Refills: 0 | Status: DISCONTINUED | OUTPATIENT
Start: 2022-08-03 | End: 2022-08-03

## 2022-08-03 RX ADMIN — Medication 25 MILLIGRAM(S): at 22:49

## 2022-08-03 RX ADMIN — Medication 1 MILLIGRAM(S): at 22:57

## 2022-08-03 RX ADMIN — Medication 10 MILLIGRAM(S): at 22:49

## 2022-08-03 RX ADMIN — FAMOTIDINE 20 MILLIGRAM(S): 10 INJECTION INTRAVENOUS at 22:50

## 2022-08-03 RX ADMIN — Medication 100 MILLIGRAM(S): at 22:57

## 2022-08-03 RX ADMIN — SODIUM CHLORIDE 1000 MILLILITER(S): 9 INJECTION, SOLUTION INTRAVENOUS at 22:49

## 2022-08-03 NOTE — ED PROVIDER NOTE - CLINICAL SUMMARY MEDICAL DECISION MAKING FREE TEXT BOX
Patient with hx of etoh use, presentation of mild-moderate ETOH withdrawal a/w nausea/vomiting. (+) hypoglycemia 63, but awake. Currently awake, alert, hemodynamically stable.     DDX: Wernicke's encephalopathy 2/2 thiamine deficiency  (i.e. ophthalmoplegia/nystagmus, ataxia, confusion/memory impairment), Korsakoff's Psychosis 2/2 thiamine deficiency, Wernicke-Korsakoff's syndrome, magnesium deficiency, delerium tremens (i.e. delirium/AMS, global confusion, agitation, autonomic hyperactivity: diaphoresis, tachycardia, tachypnea, hypertension, hyperthermia), alcoholic ketoacidosis; but unlikely given the HPI and exam. FSG normal.    - CBC, CMP,  Lipase,  Ammonia,  ETOH; Cardiac monitoring for autonomic hyperactivity.  - 500mg IVPB Thiamine for Wernicke-Korsakoff Prophylaxis and likely malnutrition state, IVPB 1-2g MgSO4 PRN hypomagnesemic state, electrolyte repletion PRN     - CIWA Symptom Triggered: For Mild ETOH W/D: Escalating doses of Diazepam 10-40 mg IVP q5-10min (fast onset, w/ rapid peak effect 2-3 min, more lipophilic) until desired reduction in psychomotor agitation or RASS 0 to -1 or per CIWA protocol. For Severe BDZ-resistant ETOH W/D w/o hepatic ecephalopathy, HAART medications, hx of acute intermittent porphyria: Consider Phenobarbital 130-260mg [id number] PRN and intubation/propofol infusion.  - Disposition: admit
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
## 2022-08-03 NOTE — ED PROVIDER NOTE - CARE PLAN
Principal Discharge DX:	Alcohol dependence with withdrawal  Secondary Diagnosis:	Traumatic hematoma   1

## 2022-08-03 NOTE — ED PROVIDER NOTE - PHYSICAL EXAMINATION
VITAL SIGNS: I have reviewed nursing notes and confirm.   GEN: Well-developed; (+) disheveled; in no acute distress. (+) intoxicated, acetone-smell on breath.  SKIN: Warm, pink, no rash, no diaphoresis, no cyanosis, well perfused.   HEAD: Normocephalic; atraumatic. No scalp lacerations, no abrasions.  NECK: Supple; non tender. No midline TTP. No step offs.  EYES: Pupils 3mm equal, round, reactive to light and accomodation, conjunctiva and sclera clear. Extra-ocular movements intact bilaterally.  ENT: No nasal discharge; airway clear. Trachea is midline. ORAL: No oropharyngeal exudates or erythema. Normal dentition.  CV: Regular rate and rhythm. S1, S2 normal; no murmurs, gallops, or rubs. No lower extremity pitting edema bilaterally. Capillary refill < 2 seconds throughout. Distal pulses intact 2+ throughout.  RESP: CTA bilaterally. No wheezes, rales, or rhonchi.   ABD: Normal bowel sounds, soft, non-distended, non-tender, no hepatosplenomegaly. No CVA tenderness bilaterally. (+) LEFT Lower pelvic anterior lateral indurate, nontender lesion, no fluctuance, no overlying erythema. no lymphangitis.  MSK: Normal range of motion and movement of all 4 extremities. No joint or muscular pain throughout. No clubbing.   BACK: No thoracolumbar midline or paravertebral tenderness. No step-offs or obvious deformities.  NEURO: Alert & oriented x 3, Grossly unremarkable. Sensory and motor intact throughout. No focal deficits. Normal speech and coordination. NO tremors, NO agitation.  PSYCH: Cooperative, appropriate. NO apparent hallucinations. NO depression. NOT anxious appearing.

## 2022-08-04 DIAGNOSIS — T14.8XXA OTHER INJURY OF UNSPECIFIED BODY REGION, INITIAL ENCOUNTER: ICD-10-CM

## 2022-08-04 DIAGNOSIS — F10.10 ALCOHOL ABUSE, UNCOMPLICATED: ICD-10-CM

## 2022-08-04 DIAGNOSIS — E87.6 HYPOKALEMIA: ICD-10-CM

## 2022-08-04 DIAGNOSIS — F10.239 ALCOHOL DEPENDENCE WITH WITHDRAWAL, UNSPECIFIED: ICD-10-CM

## 2022-08-04 LAB
ALBUMIN SERPL ELPH-MCNC: 3.8 G/DL — SIGNIFICANT CHANGE UP (ref 3.3–5)
ALP SERPL-CCNC: 96 U/L — SIGNIFICANT CHANGE UP (ref 40–120)
ALT FLD-CCNC: 171 U/L — HIGH (ref 12–78)
ANION GAP SERPL CALC-SCNC: 13 MMOL/L — SIGNIFICANT CHANGE UP (ref 5–17)
AST SERPL-CCNC: 258 U/L — HIGH (ref 15–37)
B-OH-BUTYR SERPL-SCNC: 3.7 MMOL/L — HIGH
BASOPHILS # BLD AUTO: 0 K/UL — SIGNIFICANT CHANGE UP (ref 0–0.2)
BASOPHILS NFR BLD AUTO: 0 % — SIGNIFICANT CHANGE UP (ref 0–2)
BILIRUB DIRECT SERPL-MCNC: 0.6 MG/DL — HIGH (ref 0–0.3)
BILIRUB INDIRECT FLD-MCNC: 0.7 MG/DL — SIGNIFICANT CHANGE UP (ref 0.2–1)
BILIRUB SERPL-MCNC: 1.3 MG/DL — HIGH (ref 0.2–1.2)
BUN SERPL-MCNC: 9 MG/DL — SIGNIFICANT CHANGE UP (ref 7–23)
CALCIUM SERPL-MCNC: 9 MG/DL — SIGNIFICANT CHANGE UP (ref 8.5–10.1)
CHLORIDE SERPL-SCNC: 93 MMOL/L — LOW (ref 96–108)
CO2 SERPL-SCNC: 30 MMOL/L — SIGNIFICANT CHANGE UP (ref 22–31)
CREAT SERPL-MCNC: 0.8 MG/DL — SIGNIFICANT CHANGE UP (ref 0.5–1.3)
EGFR: 124 ML/MIN/1.73M2 — SIGNIFICANT CHANGE UP
EOSINOPHIL # BLD AUTO: 0 K/UL — SIGNIFICANT CHANGE UP (ref 0–0.5)
EOSINOPHIL NFR BLD AUTO: 0 % — SIGNIFICANT CHANGE UP (ref 0–6)
FLUAV AG NPH QL: SIGNIFICANT CHANGE UP
FLUBV AG NPH QL: SIGNIFICANT CHANGE UP
GLUCOSE SERPL-MCNC: 56 MG/DL — LOW (ref 70–99)
HCT VFR BLD CALC: 42 % — SIGNIFICANT CHANGE UP (ref 39–50)
HGB BLD-MCNC: 14.9 G/DL — SIGNIFICANT CHANGE UP (ref 13–17)
LYMPHOCYTES # BLD AUTO: 1.69 K/UL — SIGNIFICANT CHANGE UP (ref 1–3.3)
LYMPHOCYTES # BLD AUTO: 53 % — HIGH (ref 13–44)
MAGNESIUM SERPL-MCNC: 2.2 MG/DL — SIGNIFICANT CHANGE UP (ref 1.6–2.6)
MANUAL SMEAR VERIFICATION: SIGNIFICANT CHANGE UP
MCHC RBC-ENTMCNC: 35.5 G/DL — SIGNIFICANT CHANGE UP (ref 32–36)
MCHC RBC-ENTMCNC: 36.4 PG — HIGH (ref 27–34)
MCV RBC AUTO: 102.7 FL — HIGH (ref 80–100)
MONOCYTES # BLD AUTO: 0.06 K/UL — SIGNIFICANT CHANGE UP (ref 0–0.9)
MONOCYTES NFR BLD AUTO: 2 % — SIGNIFICANT CHANGE UP (ref 2–14)
NEUTROPHILS # BLD AUTO: 1.43 K/UL — LOW (ref 1.8–7.4)
NEUTROPHILS NFR BLD AUTO: 45 % — SIGNIFICANT CHANGE UP (ref 43–77)
NRBC # BLD: 0 /100 — SIGNIFICANT CHANGE UP (ref 0–0)
NRBC # BLD: SIGNIFICANT CHANGE UP /100 WBCS (ref 0–0)
PHOSPHATE SERPL-MCNC: 2.9 MG/DL — SIGNIFICANT CHANGE UP (ref 2.5–4.5)
PLAT MORPH BLD: NORMAL — SIGNIFICANT CHANGE UP
PLATELET # BLD AUTO: 129 K/UL — LOW (ref 150–400)
POTASSIUM SERPL-MCNC: 3.1 MMOL/L — LOW (ref 3.5–5.3)
POTASSIUM SERPL-SCNC: 3.1 MMOL/L — LOW (ref 3.5–5.3)
PROT SERPL-MCNC: 7.7 GM/DL — SIGNIFICANT CHANGE UP (ref 6–8.3)
RBC # BLD: 4.09 M/UL — LOW (ref 4.2–5.8)
RBC # FLD: 13.2 % — SIGNIFICANT CHANGE UP (ref 10.3–14.5)
RBC BLD AUTO: NORMAL — SIGNIFICANT CHANGE UP
SARS-COV-2 RNA SPEC QL NAA+PROBE: SIGNIFICANT CHANGE UP
SODIUM SERPL-SCNC: 136 MMOL/L — SIGNIFICANT CHANGE UP (ref 135–145)
TROPONIN I, HIGH SENSITIVITY RESULT: 10.4 NG/L — SIGNIFICANT CHANGE UP
WBC # BLD: 3.18 K/UL — LOW (ref 3.8–10.5)
WBC # FLD AUTO: 3.18 K/UL — LOW (ref 3.8–10.5)

## 2022-08-04 PROCEDURE — 70450 CT HEAD/BRAIN W/O DYE: CPT | Mod: 26,MA

## 2022-08-04 PROCEDURE — 72125 CT NECK SPINE W/O DYE: CPT | Mod: 26,MA

## 2022-08-04 PROCEDURE — 93010 ELECTROCARDIOGRAM REPORT: CPT

## 2022-08-04 PROCEDURE — 10140 I&D HMTMA SEROMA/FLUID COLLJ: CPT | Mod: LT

## 2022-08-04 PROCEDURE — 74177 CT ABD & PELVIS W/CONTRAST: CPT | Mod: 26,MA

## 2022-08-04 PROCEDURE — 99222 1ST HOSP IP/OBS MODERATE 55: CPT

## 2022-08-04 PROCEDURE — 71260 CT THORAX DX C+: CPT | Mod: 26,MA

## 2022-08-04 RX ORDER — THIAMINE MONONITRATE (VIT B1) 100 MG
100 TABLET ORAL ONCE
Refills: 0 | Status: COMPLETED | OUTPATIENT
Start: 2022-08-04 | End: 2022-08-04

## 2022-08-04 RX ORDER — PANTOPRAZOLE SODIUM 20 MG/1
40 TABLET, DELAYED RELEASE ORAL
Refills: 0 | Status: DISCONTINUED | OUTPATIENT
Start: 2022-08-04 | End: 2022-08-05

## 2022-08-04 RX ORDER — TRAMADOL HYDROCHLORIDE 50 MG/1
25 TABLET ORAL ONCE
Refills: 0 | Status: DISCONTINUED | OUTPATIENT
Start: 2022-08-04 | End: 2022-08-04

## 2022-08-04 RX ORDER — LIDOCAINE HCL 20 MG/ML
20 VIAL (ML) INJECTION ONCE
Refills: 0 | Status: DISCONTINUED | OUTPATIENT
Start: 2022-08-04 | End: 2022-08-05

## 2022-08-04 RX ORDER — ACETAMINOPHEN 500 MG
650 TABLET ORAL EVERY 6 HOURS
Refills: 0 | Status: DISCONTINUED | OUTPATIENT
Start: 2022-08-04 | End: 2022-08-05

## 2022-08-04 RX ORDER — LANOLIN ALCOHOL/MO/W.PET/CERES
3 CREAM (GRAM) TOPICAL AT BEDTIME
Refills: 0 | Status: DISCONTINUED | OUTPATIENT
Start: 2022-08-04 | End: 2022-08-05

## 2022-08-04 RX ORDER — POTASSIUM CHLORIDE 20 MEQ
40 PACKET (EA) ORAL ONCE
Refills: 0 | Status: COMPLETED | OUTPATIENT
Start: 2022-08-04 | End: 2022-08-04

## 2022-08-04 RX ORDER — FOLIC ACID 0.8 MG
1 TABLET ORAL DAILY
Refills: 0 | Status: DISCONTINUED | OUTPATIENT
Start: 2022-08-04 | End: 2022-08-05

## 2022-08-04 RX ORDER — THIAMINE MONONITRATE (VIT B1) 100 MG
100 TABLET ORAL DAILY
Refills: 0 | Status: DISCONTINUED | OUTPATIENT
Start: 2022-08-04 | End: 2022-08-05

## 2022-08-04 RX ORDER — ONDANSETRON 8 MG/1
4 TABLET, FILM COATED ORAL EVERY 8 HOURS
Refills: 0 | Status: DISCONTINUED | OUTPATIENT
Start: 2022-08-04 | End: 2022-08-05

## 2022-08-04 RX ADMIN — Medication 1 TABLET(S): at 10:40

## 2022-08-04 RX ADMIN — Medication 50 MILLIGRAM(S): at 23:31

## 2022-08-04 RX ADMIN — Medication 40 MILLIEQUIVALENT(S): at 06:41

## 2022-08-04 RX ADMIN — Medication 50 MILLIGRAM(S): at 02:59

## 2022-08-04 RX ADMIN — ONDANSETRON 4 MILLIGRAM(S): 8 TABLET, FILM COATED ORAL at 23:33

## 2022-08-04 RX ADMIN — Medication 650 MILLIGRAM(S): at 06:29

## 2022-08-04 RX ADMIN — Medication 50 MILLIGRAM(S): at 17:10

## 2022-08-04 RX ADMIN — Medication 50 MILLIGRAM(S): at 06:28

## 2022-08-04 RX ADMIN — PANTOPRAZOLE SODIUM 40 MILLIGRAM(S): 20 TABLET, DELAYED RELEASE ORAL at 06:41

## 2022-08-04 RX ADMIN — Medication 50 MILLIGRAM(S): at 10:41

## 2022-08-04 RX ADMIN — TRAMADOL HYDROCHLORIDE 25 MILLIGRAM(S): 50 TABLET ORAL at 22:33

## 2022-08-04 RX ADMIN — ONDANSETRON 4 MILLIGRAM(S): 8 TABLET, FILM COATED ORAL at 13:59

## 2022-08-04 RX ADMIN — Medication 1 MILLIGRAM(S): at 10:42

## 2022-08-04 RX ADMIN — Medication 50 MILLIGRAM(S): at 13:53

## 2022-08-04 RX ADMIN — TRAMADOL HYDROCHLORIDE 25 MILLIGRAM(S): 50 TABLET ORAL at 21:33

## 2022-08-04 RX ADMIN — Medication 100 MILLIGRAM(S): at 10:41
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
## 2022-08-04 NOTE — H&P ADULT - NSHPREVIEWOFSYSTEMS_GEN_ALL_CORE
REVIEW OF SYSTEMS:    CONSTITUTIONAL: No weakness, fevers or chills  EYES/ENT: No visual changes;  No vertigo or throat pain   NECK: No pain or stiffness  RESPIRATORY: No cough, wheezing, hemoptysis; No shortness of breath  CARDIOVASCULAR: No chest pain or palpitations  GASTROINTESTINAL: + abdominal pain. + nausea,  - vomiting, no hematemesis; No diarrhea or constipation. No melena or hematochezia.  GENITOURINARY: No dysuria, frequency or hematuria  NEUROLOGICAL: No numbness or weakness  SKIN: No itching,  injury to left stomach /flank No purulent discharge.

## 2022-08-04 NOTE — H&P ADULT - NSHPLABSRESULTS_GEN_ALL_CORE
=======================================================  Labs:                        14.9   3.18  )-----------( 129      ( 03 Aug 2022 22:50 )             42.0     08-03    139  |  97  |  11  ----------------------------<  63<L>  3.1<L>   |  29  |  0.82    Ca    8.9      03 Aug 2022 22:50  Mg     2.3     08-03    TPro  8.8<H>  /  Alb  4.0  /  TBili  0.9  /  DBili  x   /  AST  415<H>  /  ALT  221<H>  /  AlkPhos  99  08-03      Creatinine, Serum: 0.82 mg/dL (08-03-22 @ 22:50)            WBC Count: 3.18 K/uL (08-03-22 @ 22:50)    SARS-CoV-2 Result: NotDetec (07-12-22 @ 09:10)      Alkaline Phosphatase, Serum: 99 U/L (08-03-22 @ 22:50)  Alanine Aminotransferase (ALT/SGPT): 221 U/L (08-03-22 @ 22:50)  Aspartate Aminotransferase (AST/SGOT): 415 U/L (08-03-22 @ 22:50)  Bilirubin Total, Serum: 0.9 mg/dL (08-03-22 @ 22:50)

## 2022-08-04 NOTE — SBIRT NOTE ADULT - NSSBIRTALCPASSREFTXDET_GEN_A_CORE
Provided SBIRT services: Full screen positive. Referral to Treatment Performed. Screening results were reviewed with the patient and patient was provided information about healthy guidelines and potential negative consequences associated with level of risk. Motivation and readiness to reduce or stop use was discussed and goals and activities to make changes were suggested/offered. Referral for complete assessment and level of care determination at a certified treatment facility was completed by giving the patient information for treatment facilities that met their needs and encouraging them to call for an appointment. A call was not made to a facility because: Patient requires medical care- admitted to [facility]. Patient interested in rehab placement at [facility]. Patient provided with [facility] contact information and encouraged to request SW help once IP, as facility will need his d/c date to consider placement. SBIRT flyer provided to patient if further assistance is needed.

## 2022-08-04 NOTE — ED ADULT NURSE REASSESSMENT NOTE - NS ED NURSE REASSESS COMMENT FT1
pt found on floor in room. pt states he did not fall and laid down. DEBORAH Ramirez aware. pt to go to CT of head and spine. pt moved to 34D. CO initiated due to risk of elopement. NAD. pt found on floor in room. pt states he did not fall and laid down. prior to incident pt observed to walk to nurses station with IV pole to ask for ice chips and was assisted back to bed. DEBORAH Ramirez aware. pt to go to CT of head and spine. pt moved to 34D. CO initiated due to risk of elopement. NAD.
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
## 2022-08-04 NOTE — ED ADULT NURSE NOTE - NSIMPLEMENTINTERV_GEN_ALL_ED
Implemented All Fall Risk Interventions:  [place] to call system. Call bell, personal items and telephone within reach. Instruct patient to call for assistance. Room bathroom lighting operational. Non-slip footwear when patient is off stretcher. Physically safe environment: no spills, clutter or unnecessary equipment. Stretcher in lowest position, wheels locked, appropriate side rails in place. Provide visual cue, wrist band, yellow gown, etc. Monitor gait and stability. Monitor for mental status changes and reorient to person, place, and time. Review medications for side effects contributing to fall risk. Reinforce activity limits and safety measures with patient and family.

## 2022-08-04 NOTE — ED ADULT NURSE NOTE - ED STAT RN HANDOFF DETAILS
1D Report endorsed to oncoming RN Savanah. Safety checks compld this shift/Safety rounds completed hourly.  IV sites checked Q2+remains WDL. Meds given as ord with no s/s of adverse RXNs. Fall +skin precs in place. Any issues endorsed to oncoming RN for follow up.

## 2022-08-04 NOTE — ED ADULT NURSE REASSESSMENT NOTE - NS ED NURSE REASSESS COMMENT FT1
pt is sleeping at this time, continue on the monitors, in no sign of distress, IV intact, !:!discontinued by Doctor Ferny. incident report done by covering nurse Carlene

## 2022-08-04 NOTE — PATIENT PROFILE ADULT - FALL HARM RISK - HARM RISK INTERVENTIONS
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
## 2022-08-04 NOTE — H&P ADULT - PROBLEM SELECTOR PLAN 3
- Hematoma in left abdominal wall  - Imaging showing hematoma  - Chart review notes pt has this since 7/20 and it was incised and aspiration was attempted but only blood was drawn .

## 2022-08-04 NOTE — H&P ADULT - NSHPPHYSICALEXAM_GEN_ALL_CORE
VITALS:   T(C): 36.5 (08-04-22 @ 04:01), Max: 36.7 (08-03-22 @ 22:01)  HR: 89 (08-04-22 @ 04:01) (89 - 98)  BP: 134/89 (08-04-22 @ 04:01) (111/81 - 134/89)  RR: 17 (08-04-22 @ 04:01) (17 - 17)  SpO2: 97% (08-04-22 @ 04:01) (96% - 97%)    GENERAL: NAD, lying in bed comfortably  HEAD:  Atraumatic, Normocephalic  EYES: EOMI, PERRLA, conjunctiva and sclera clear  ENT: Moist mucous membranes  NECK: Supple, No JVD  CHEST/LUNG: Clear to auscultation bilaterally; Unlabored respirations  HEART: Regular rate and rhythm; No murmurs, rubs, or gallops  ABDOMEN: BSx4; Soft, nontender, nondistended Left lower towards flank hard tender area. not fluctuant. No drainage.   EXTREMITIES:  2+ Peripheral Pulses, brisk capillary refill. No clubbing, cyanosis, or edema  NERVOUS SYSTEM:  A&Ox3, no focal deficits   SKIN: No rashes or lesions
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
## 2022-08-04 NOTE — CHART NOTE - NSCHARTNOTEFT_GEN_A_CORE
Pt has history of alcoholism, sickle cell disease (? as his hgb is relatively normal, likely not HbSS), whom was admitted overnight for alcohol intoxication.  Speaking with nursing his CIWA was previously seven, when I saw him his symptoms were well controlled after receiving a dose of librium.  He was complaining of L hip pain.  On examination, he has a ~3-4cm circumscribed, slightly erythematous lesion on the cutaneous L lateral hip.  Tender to palpation, slightly warm.  Has been there for a few days.    Abscess location on L hip  General surgery consultation to evaluate for I&D, called Dr. Dash, appreciate follow up.  No s/o systemic illness currently. Pain control with tylenol or ibuprofen    Alcohol withdrawal syndrome  Continue CIWA monitoring and PRN benzodiazepines    Dispo is pending course of alcohol withdrawal.  SW / SBIRT consultation to try and better understand the psychosocial stressors that are causing him to present to our hospital so frequently in the past 1-2 months (near constant).    Tj Yip MD FACP Pt has history of alcoholism, sickle cell disease (? as his hgb is relatively normal, likely not HbSS), whom was admitted overnight for alcohol intoxication.  Speaking with nursing his CIWA was previously seven, when I saw him his symptoms were well controlled after receiving a dose of librium.  He was complaining of L hip pain.  On examination, he has a ~3-4cm circumscribed, slightly erythematous lesion on the cutaneous L lateral hip.  Tender to palpation, slightly warm.  Has been there for a few days.    Abscess location on L hip  General surgery consultation to evaluate for I&D, called Dr. Dash, appreciate follow up.  No s/o systemic illness currently. Pain control with tylenol or ibuprofen.  Looks like it was read as a ?hematoma on imaging done.    Alcohol withdrawal syndrome  Continue CIWA monitoring and PRN benzodiazepines    Dispo is pending course of alcohol withdrawal.  SW / SBIRT consultation to try and better understand the psychosocial stressors that are causing him to present to our hospital so frequently in the past 1-2 months (near constant).    Tj Yip MD FACP

## 2022-08-05 VITALS
RESPIRATION RATE: 17 BRPM | SYSTOLIC BLOOD PRESSURE: 105 MMHG | HEART RATE: 69 BPM | TEMPERATURE: 98 F | OXYGEN SATURATION: 97 % | DIASTOLIC BLOOD PRESSURE: 71 MMHG

## 2022-08-05 PROCEDURE — 99238 HOSP IP/OBS DSCHRG MGMT 30/<: CPT

## 2022-08-05 RX ADMIN — Medication 50 MILLIGRAM(S): at 06:05

## 2022-08-05 RX ADMIN — Medication 1 TABLET(S): at 12:20

## 2022-08-05 RX ADMIN — Medication 650 MILLIGRAM(S): at 11:18

## 2022-08-05 RX ADMIN — Medication 650 MILLIGRAM(S): at 10:18

## 2022-08-05 RX ADMIN — Medication 1 MILLIGRAM(S): at 12:20

## 2022-08-05 RX ADMIN — Medication 100 MILLIGRAM(S): at 12:20

## 2022-08-05 RX ADMIN — Medication 50 MILLIGRAM(S): at 13:08

## 2022-08-05 NOTE — PROGRESS NOTE ADULT - SUBJECTIVE AND OBJECTIVE BOX
Surgery NP note  Patient seen and examined bedside resting comfortably.  No complaints offered. Denies nausea and vomiting. Tolerating diet.  Normal flatus/BM.     T(F): 98 (08-05-22 @ 12:17), Max: 99.8 (08-04-22 @ 17:41)  HR: 69 (08-05-22 @ 12:17) (69 - 99)  BP: 105/71 (08-05-22 @ 12:17) (105/71 - 138/85)  RR: 17 (08-05-22 @ 12:17) (17 - 18)  SpO2: 97% (08-05-22 @ 12:17) (96% - 98%)  Wt(kg): --  CAPILLARY BLOOD GLUCOSE          PHYSICAL EXAM:  General: NAD, WDWN.   Neuro:  Alert & responsive  HEENT: NCAT, EOMI, conjunctiva clear  CV: +S1+S2 regular rate and rhythm  Lung: clear to ausculation bilaterally, respirations nonlabored, good inspiratory effort  Abdomen: soft, Non tender, No Distension. Normal active BS. LLQ wound repacked with 1 inch iodoform. No drainage, no foul odor or s/s infection  Extremities: no pedal edema or calf tenderness noted    LABS:                        14.9   3.18  )-----------( 129      ( 03 Aug 2022 22:50 )             42.0     08-04    136  |  93<L>  |  9   ----------------------------<  56<L>  3.1<L>   |  30  |  0.80    Ca    9.0      04 Aug 2022 10:27  Phos  2.9     08-04  Mg     2.2     08-04    TPro  7.7  /  Alb  3.8  /  TBili  1.3<H>  /  DBili  0.6<H>  /  AST  258<H>  /  ALT  171<H>  /  AlkPhos  96  08-04    LIVER FUNCTIONS - ( 04 Aug 2022 10:27 )  Alb: 3.8 g/dL / Pro: 7.7 gm/dL / ALK PHOS: 96 U/L / ALT: 171 U/L / AST: 258 U/L / GGT: x           PT/INR - ( 03 Aug 2022 22:50 )   PT: 11.7 sec;   INR: 0.98 ratio         PTT - ( 03 Aug 2022 22:50 )  PTT:28.0 sec  I&O's Detail    05 Aug 2022 07:01  -  05 Aug 2022 13:53  --------------------------------------------------------  IN:    Oral Fluid: 960 mL  Total IN: 960 mL    OUT:  Total OUT: 0 mL    Total NET: 960 mL

## 2022-08-05 NOTE — PROGRESS NOTE ADULT - ASSESSMENT
26M admitted with ETOH withdrawal seen with left flank hematoma  s/p bedside I&D, see separate documentation    daily packing changes with 1 inch iodoform packing strip daily  medical management  Follow up cx  discussed with Dr Ramirez

## 2022-08-05 NOTE — DISCHARGE NOTE PROVIDER - CARE PROVIDER_API CALL
Mercedes Dash)  Surgery  [address] [place] [address]  Phone: [phone]  Fax: [phone]  Follow Up Time: 1 week

## 2022-08-05 NOTE — DISCHARGE NOTE PROVIDER - NSDCFUADDINST_GEN_ALL_CORE_FT
WOUND care: Cleanse wound with NS, remove packing and repack with 1 inch Iodoform packing strip daily.

## 2022-08-05 NOTE — DISCHARGE NOTE PROVIDER - NSDCMRMEDTOKEN_GEN_ALL_CORE_FT
aluminum hydroxide-magnesium hydroxide 200 mg-200 mg/5 mL oral suspension: 30 milliliter(s) orally every 4 hours, As needed, Dyspepsia  Bactrim  mg-160 mg oral tablet: 1 tab(s) orally 2 times a day   folic acid 1 mg oral tablet: 1 tab(s) orally once a day  Multiple Vitamins oral tablet: 1 tab(s) orally once a day  Protonix 40 mg oral delayed release tablet: 40 tab(s) orally once a day   thiamine 100 mg oral tablet: 1 tab(s) orally once a day
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
## 2022-08-07 LAB
CULTURE RESULTS: NO GROWTH — SIGNIFICANT CHANGE UP
SPECIMEN SOURCE: SIGNIFICANT CHANGE UP

## 2022-08-09 DIAGNOSIS — K85.20 ALCOHOL INDUCED ACUTE PANCREATITIS WITHOUT NECROSIS OR INFECTION: ICD-10-CM

## 2022-08-09 DIAGNOSIS — F10.239 ALCOHOL DEPENDENCE WITH WITHDRAWAL, UNSPECIFIED: ICD-10-CM

## 2022-08-09 DIAGNOSIS — E16.2 HYPOGLYCEMIA, UNSPECIFIED: ICD-10-CM
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
## 2022-08-09 DIAGNOSIS — S70.02XA CONTUSION OF LEFT HIP, INITIAL ENCOUNTER: ICD-10-CM

## 2022-08-09 DIAGNOSIS — E87.6 HYPOKALEMIA: ICD-10-CM

## 2022-08-09 DIAGNOSIS — D57.1 SICKLE-CELL DISEASE WITHOUT CRISIS: ICD-10-CM
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
## 2023-01-23 NOTE — H&P ADULT - PROBLEM SELECTOR PROBLEM 3
"ETOH abuse

## 2023-03-09 NOTE — ED PROVIDER NOTE - SECONDARY DIAGNOSIS.
Adbry Pregnancy And Lactation Text: It is unknown if this medication will adversely affect pregnancy or breast feeding. Hematemesis with nausea
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
## 2023-12-15 NOTE — ED PROVIDER NOTE - IV ALTEPLASE DOOR HIDDEN
History: Follow-up diabetes: Thinks controlled:Yes  checks BS: No  Takes Metformin 1000 mg twice daily and Mounjaro 5 mg subcutaneously once a week , not missing dose of medication, denies side effects medication  Eating healthy:Yes, patient shares that due to the mounjaro she has been able to reduce portion sizes and increase vegetable portions  Exercise:Yes, patient does walk dog  Retinal exam:Has not gone this last year, patient does express motivation to have exam done  Patient denies any fatigue, night sweats, weight changes, chest pain, palpitations, cough, shortness of breath, abdominal pain, nausea, vomiting, diarrhea, constipation, dysuria, polyuria, polyphagia, leg swelling, numbness, tingling, weakness of extremities, headache, vision changes, sores of feet, rashes, episodes of hypoglyemia, dizziness, lightheadedness  Assessment: established condition   Plan: Reviewed most recent A1c from 12/15/2023 that is 5.7%, diabetes is controlled.  Reviewed goals of self-management and encouraged to check blood sugars 2x daily.  Retinal exam is not up-to-date and next due now.  Advised on importance of following a low carbohydrate, heart healthy eating plan, 30 minutes exercise daily and  maintaining healthy weight.  Continue regimen of: Metformin 1000 mg twice daily. Patient only has 3 more Mounjaro 5 mg doses left. Advised to finish remaining doses then Begin Mounjaro 7.5 mg subcutaneously weekly.  Recheck A1c in 4 months.   show

## 2023-12-27 NOTE — ED ADULT NURSE NOTE - NEURO ASSESSMENT
Case Management Assessment  Initial Evaluation    Date/Time of Evaluation: 12/27/2023 10:06 AM  Assessment Completed by: Sweta Guthrie RN    If patient is discharged prior to next notation, then this note serves as note for discharge by case management.    Patient Name: Ant Carrasquillo                   YOB: 1957  Diagnosis: Septic shock (HCC) [A41.9, R65.21]                   Date / Time: 12/26/2023 10:25 PM    Patient Admission Status: Inpatient   Readmission Risk (Low < 19, Mod (19-27), High > 27): Readmission Risk Score: 11.7    Current PCP: Minnie Coelho MD  PCP verified by CM? Yes    Chart Reviewed: Yes      History Provided by: Patient  Patient Orientation: Alert and Oriented    Patient Cognition: Alert    Hospitalization in the last 30 days (Readmission):  No    If yes, Readmission Assessment in CM Navigator will be completed.    Advance Directives:      Code Status: Full Code   Patient's Primary Decision Maker is: Legal Next of Kin    Primary Decision Maker: Janet Carrasquillo - Spouse - [phone]    Discharge Planning:    Patient lives with: Spouse/Significant Other Type of Home: House  Primary Care Giver: Self  Patient Support Systems include: Spouse/Significant Other   Current Financial resources: Medicare  Current community resources: None  Current services prior to admission: Durable Medical Equipment            Current DME: [facility]            Type of Home Care services:  None    ADLS  Prior functional level: Independent in ADLs/IADLs  Current functional level: Independent in ADLs/IADLs    PT AM-PAC:   /24  OT AM-PAC:   /24    Family can provide assistance at DC: Yes  Would you like Case Management to discuss the discharge plan with any other family members/significant others, and if so, who? No  Plans to Return to Present Housing: Yes  Other Identified Issues/Barriers to RETURNING to current housing: Sepsis  Potential Assistance needed at discharge: Other (Comment) (following)         - - -

## 2024-01-31 NOTE — ED PROVIDER NOTE - MDM ORDERS SUBMITTED SELECTION
I have personally seen and examined the patient. I have collaborated with and supervised the Labs/Medications

## 2025-01-08 NOTE — ED ADULT TRIAGE NOTE - NSTRIAGECARE_GEN_A_ER
Immediate Brief Procedure Note  Patient Name:  Solomon Francisco  YOB: 1961  DATE OF PROCEDURE : 4/1/2020  PROCEDURALIST: Braden San MD and Josh Deleon MD  ASSISTANT(S):  None  ANESTHESIA TYPE:  Moderate sedation.  ANESTHESIOLOGIST:  None    PROCEDURE PERFORMED:  Left lung nodule Biopsy    Pre-procedure Dx:   Patient Active Problem List   Diagnosis   • Physical exam   • Bronchitis   • Bronchospasm   • Nicotine abuse   • Pulmonary emphysema (CMS/HCC)   • Alcohol abuse   • SOB (shortness of breath) on exertion   • Prediabetes   • Dyslipidemia   • Pulmonary nodules   • Renovascular hypertension   • Alcoholism (CMS/HCC)   • Dyspepsia   • Iron deficiency anemia   • Colon cancer screening   • Special screening for malignant neoplasms, colon   • Lung mass   • History of femur fracture       Post-procedure Dx: Same    Findings: Technically successful left lung nodule biopsy.     Estimated Blood Loss: less than 5 ml    Complications:  None    Specimens Removed: Yes       Face Mask Normal

## 2025-06-05 NOTE — ED ADULT NURSE NOTE - NSFALLRSKHARMRISK_ED_ALL_ED
Prior Authorization for LESI    [phone]  CPT codes: 79530  pending reference # [id number]   Clinical notes submitted via fax [phone]   no